# Patient Record
Sex: MALE | Race: BLACK OR AFRICAN AMERICAN | NOT HISPANIC OR LATINO
[De-identification: names, ages, dates, MRNs, and addresses within clinical notes are randomized per-mention and may not be internally consistent; named-entity substitution may affect disease eponyms.]

---

## 2018-10-17 ENCOUNTER — TRANSCRIPTION ENCOUNTER (OUTPATIENT)
Age: 58
End: 2018-10-17

## 2018-10-19 ENCOUNTER — APPOINTMENT (OUTPATIENT)
Dept: NEUROSURGERY | Facility: CLINIC | Age: 58
End: 2018-10-19
Payer: COMMERCIAL

## 2018-10-19 VITALS
WEIGHT: 211 LBS | SYSTOLIC BLOOD PRESSURE: 160 MMHG | DIASTOLIC BLOOD PRESSURE: 91 MMHG | OXYGEN SATURATION: 98 % | HEART RATE: 82 BPM | HEIGHT: 73 IN | TEMPERATURE: 98.2 F | BODY MASS INDEX: 27.96 KG/M2

## 2018-10-19 VITALS — SYSTOLIC BLOOD PRESSURE: 160 MMHG | DIASTOLIC BLOOD PRESSURE: 81 MMHG

## 2018-10-19 DIAGNOSIS — Z80.9 FAMILY HISTORY OF MALIGNANT NEOPLASM, UNSPECIFIED: ICD-10-CM

## 2018-10-19 DIAGNOSIS — Z82.61 FAMILY HISTORY OF ARTHRITIS: ICD-10-CM

## 2018-10-19 DIAGNOSIS — Z78.9 OTHER SPECIFIED HEALTH STATUS: ICD-10-CM

## 2018-10-19 DIAGNOSIS — Z87.891 PERSONAL HISTORY OF NICOTINE DEPENDENCE: ICD-10-CM

## 2018-10-19 PROCEDURE — 99203 OFFICE O/P NEW LOW 30 MIN: CPT

## 2018-10-23 ENCOUNTER — NON-APPOINTMENT (OUTPATIENT)
Age: 58
End: 2018-10-23

## 2018-10-23 ENCOUNTER — APPOINTMENT (OUTPATIENT)
Dept: INTERNAL MEDICINE | Facility: CLINIC | Age: 58
End: 2018-10-23
Payer: COMMERCIAL

## 2018-10-23 VITALS
SYSTOLIC BLOOD PRESSURE: 150 MMHG | HEIGHT: 73 IN | WEIGHT: 211 LBS | HEART RATE: 70 BPM | BODY MASS INDEX: 27.96 KG/M2 | DIASTOLIC BLOOD PRESSURE: 80 MMHG

## 2018-10-23 DIAGNOSIS — Z80.8 FAMILY HISTORY OF MALIGNANT NEOPLASM OF OTHER ORGANS OR SYSTEMS: ICD-10-CM

## 2018-10-23 DIAGNOSIS — Z83.3 FAMILY HISTORY OF DIABETES MELLITUS: ICD-10-CM

## 2018-10-23 DIAGNOSIS — Z23 ENCOUNTER FOR IMMUNIZATION: ICD-10-CM

## 2018-10-23 PROCEDURE — 90686 IIV4 VACC NO PRSV 0.5 ML IM: CPT

## 2018-10-23 PROCEDURE — 90471 IMMUNIZATION ADMIN: CPT

## 2018-10-23 PROCEDURE — 90715 TDAP VACCINE 7 YRS/> IM: CPT

## 2018-10-23 PROCEDURE — 93000 ELECTROCARDIOGRAM COMPLETE: CPT

## 2018-10-23 PROCEDURE — 36415 COLL VENOUS BLD VENIPUNCTURE: CPT

## 2018-10-23 PROCEDURE — 82270 OCCULT BLOOD FECES: CPT

## 2018-10-23 PROCEDURE — 90472 IMMUNIZATION ADMIN EACH ADD: CPT

## 2018-10-23 PROCEDURE — 99386 PREV VISIT NEW AGE 40-64: CPT | Mod: 25

## 2018-10-23 NOTE — HEALTH RISK ASSESSMENT
[Excellent] : ~his/her~  mood as  excellent [] : No [No falls in past year] : Patient reported no falls in the past year [HIV Test offered] : HIV Test offered [Hepatitis C test offered] : Hepatitis C test offered [Change in mental status noted] : Change in mental status noted [Language] : denies difficulty with language [Behavior] : denies difficulty with behavior [Learning/Retaining New Information] : denies difficulty learning/retaining new information [Handling Complex Tasks] : denies difficulty handling complex tasks [Reasoning] : denies difficulty with reasoning [Spatial Ability and Orientation] : denies difficulty with spatial ability and orientation [With Significant Other] : lives with significant other [Employed] : employed [High School] : high school [] :  [# Of Children ___] : has [unfilled] children [Sexually Active] : sexually active [Fully functional (bathing, dressing, toileting, transferring, walking, feeding)] : Fully functional (bathing, dressing, toileting, transferring, walking, feeding) [Fully functional (using the telephone, shopping, preparing meals, housekeeping, doing laundry, using] : Fully functional and needs no help or supervision to perform IADLs (using the telephone, shopping, preparing meals, housekeeping, doing laundry, using transportation, managing medications and managing finances) [Reports changes in hearing] : Reports no changes in hearing [Reports changes in dental health] : Reports changes in dental health [Smoke Detector] : smoke detector [Seat Belt] :  uses seat belt [Travel to Developing Areas] : does not  travel to developing areas [TB Exposure] : is being exposed to tuberculosis [ColonoscopyDate] : due

## 2018-10-23 NOTE — ASSESSMENT
[FreeTextEntry1] : bp borderline will follow\par low back likely arthritis obtain x ray\par see urology prostate large and harder then expected\par complete labs\par ekg ok\par flu and tdap given

## 2018-10-23 NOTE — HISTORY OF PRESENT ILLNESS
[FreeTextEntry1] : For cpe [de-identified] : For CPE.  of [place].  Patient has been having low back pain for past few weeks\par Saw Neurosurgery and x rays were ordered he has not gone yet\par He feels pain in am and pm ok in the middle of day.  Has urine dribbling but no chest pain sob nvd or palpitations

## 2018-10-23 NOTE — PHYSICAL EXAM

## 2018-10-24 ENCOUNTER — RECORD ABSTRACTING (OUTPATIENT)
Age: 58
End: 2018-10-24

## 2018-10-24 LAB
25(OH)D3 SERPL-MCNC: 18 NG/ML
ALBUMIN SERPL ELPH-MCNC: 4.1 G/DL
ALP BLD-CCNC: 328 U/L
ALT SERPL-CCNC: 18 U/L
ANION GAP SERPL CALC-SCNC: 13 MMOL/L
APPEARANCE: CLEAR
AST SERPL-CCNC: 18 U/L
BASOPHILS # BLD AUTO: 0.03 K/UL
BASOPHILS NFR BLD AUTO: 0.3 %
BILIRUB SERPL-MCNC: 0.3 MG/DL
BILIRUBIN URINE: NEGATIVE
BLOOD URINE: NEGATIVE
BUN SERPL-MCNC: 13 MG/DL
CALCIUM SERPL-MCNC: 9.6 MG/DL
CHLORIDE SERPL-SCNC: 101 MMOL/L
CHOLEST SERPL-MCNC: 202 MG/DL
CHOLEST/HDLC SERPL: 4 RATIO
CO2 SERPL-SCNC: 27 MMOL/L
COLOR: YELLOW
CREAT SERPL-MCNC: 1.28 MG/DL
CREAT SPEC-SCNC: 191 MG/DL
EOSINOPHIL # BLD AUTO: 0.06 K/UL
EOSINOPHIL NFR BLD AUTO: 0.6 %
GLUCOSE QUALITATIVE U: NEGATIVE MG/DL
GLUCOSE SERPL-MCNC: 114 MG/DL
HBA1C MFR BLD HPLC: 6 %
HCT VFR BLD CALC: 42 %
HCV AB SER QL: NONREACTIVE
HCV S/CO RATIO: 0.08 S/CO
HDLC SERPL-MCNC: 50 MG/DL
HGB BLD-MCNC: 13.3 G/DL
HIV1+2 AB SPEC QL IA.RAPID: NONREACTIVE
IMM GRANULOCYTES NFR BLD AUTO: 0.8 %
KETONES URINE: NEGATIVE
LDLC SERPL CALC-MCNC: 121 MG/DL
LEUKOCYTE ESTERASE URINE: NEGATIVE
LYMPHOCYTES # BLD AUTO: 2.66 K/UL
LYMPHOCYTES NFR BLD AUTO: 24.5 %
MAN DIFF?: NORMAL
MCHC RBC-ENTMCNC: 26.6 PG
MCHC RBC-ENTMCNC: 31.7 GM/DL
MCV RBC AUTO: 84 FL
MICROALBUMIN 24H UR DL<=1MG/L-MCNC: 2.2 MG/DL
MICROALBUMIN/CREAT 24H UR-RTO: 12 MG/G
MONOCYTES # BLD AUTO: 1.16 K/UL
MONOCYTES NFR BLD AUTO: 10.7 %
NEUTROPHILS # BLD AUTO: 6.86 K/UL
NEUTROPHILS NFR BLD AUTO: 63.1 %
NITRITE URINE: NEGATIVE
PH URINE: 5
PLATELET # BLD AUTO: 276 K/UL
POTASSIUM SERPL-SCNC: 3.6 MMOL/L
PROT SERPL-MCNC: 7.4 G/DL
PROTEIN URINE: NEGATIVE MG/DL
PSA SERPL-MCNC: 890.6 NG/ML
RBC # BLD: 5 M/UL
RBC # FLD: 15.5 %
SODIUM SERPL-SCNC: 141 MMOL/L
SPECIFIC GRAVITY URINE: 1.02
T4 FREE SERPL-MCNC: 1.1 NG/DL
TRIGL SERPL-MCNC: 153 MG/DL
TSH SERPL-ACNC: 1.97 UIU/ML
UROBILINOGEN URINE: NEGATIVE MG/DL
WBC # FLD AUTO: 10.86 K/UL

## 2018-11-01 ENCOUNTER — APPOINTMENT (OUTPATIENT)
Dept: NEUROSURGERY | Facility: CLINIC | Age: 58
End: 2018-11-01
Payer: COMMERCIAL

## 2018-11-01 VITALS
SYSTOLIC BLOOD PRESSURE: 155 MMHG | OXYGEN SATURATION: 97 % | WEIGHT: 211 LBS | DIASTOLIC BLOOD PRESSURE: 87 MMHG | HEART RATE: 84 BPM | BODY MASS INDEX: 27.96 KG/M2 | TEMPERATURE: 98 F | HEIGHT: 73 IN

## 2018-11-01 DIAGNOSIS — M54.16 RADICULOPATHY, LUMBAR REGION: ICD-10-CM

## 2018-11-01 PROCEDURE — 99213 OFFICE O/P EST LOW 20 MIN: CPT

## 2018-11-02 PROBLEM — M54.16 LUMBAR RADICULOPATHY: Status: ACTIVE | Noted: 2018-11-02

## 2018-11-08 ENCOUNTER — APPOINTMENT (OUTPATIENT)
Dept: UROLOGY | Facility: CLINIC | Age: 58
End: 2018-11-08
Payer: COMMERCIAL

## 2018-11-08 DIAGNOSIS — M54.5 LOW BACK PAIN: ICD-10-CM

## 2018-11-08 PROCEDURE — 99204 OFFICE O/P NEW MOD 45 MIN: CPT

## 2018-11-09 LAB — PSA SERPL-MCNC: 1034 NG/ML

## 2018-11-11 ENCOUNTER — FORM ENCOUNTER (OUTPATIENT)
Age: 58
End: 2018-11-11

## 2018-11-12 ENCOUNTER — OUTPATIENT (OUTPATIENT)
Dept: OUTPATIENT SERVICES | Facility: HOSPITAL | Age: 58
LOS: 1 days | End: 2018-11-12
Payer: COMMERCIAL

## 2018-11-12 ENCOUNTER — APPOINTMENT (OUTPATIENT)
Dept: NUCLEAR MEDICINE | Facility: IMAGING CENTER | Age: 58
End: 2018-11-12
Payer: COMMERCIAL

## 2018-11-12 DIAGNOSIS — C61 MALIGNANT NEOPLASM OF PROSTATE: ICD-10-CM

## 2018-11-12 PROBLEM — M54.5 LOWER BACK PAIN: Status: ACTIVE | Noted: 2018-10-19

## 2018-11-12 PROCEDURE — 78306 BONE IMAGING WHOLE BODY: CPT

## 2018-11-12 PROCEDURE — 78306 BONE IMAGING WHOLE BODY: CPT | Mod: 26

## 2018-11-12 PROCEDURE — A9561: CPT

## 2018-11-27 ENCOUNTER — APPOINTMENT (OUTPATIENT)
Dept: UROLOGY | Facility: CLINIC | Age: 58
End: 2018-11-27
Payer: COMMERCIAL

## 2018-11-27 ENCOUNTER — OUTPATIENT (OUTPATIENT)
Dept: OUTPATIENT SERVICES | Facility: HOSPITAL | Age: 58
LOS: 1 days | End: 2018-11-27
Payer: COMMERCIAL

## 2018-11-27 VITALS
HEART RATE: 77 BPM | SYSTOLIC BLOOD PRESSURE: 143 MMHG | DIASTOLIC BLOOD PRESSURE: 77 MMHG | TEMPERATURE: 97.7 F | RESPIRATION RATE: 16 BRPM

## 2018-11-27 DIAGNOSIS — R35.0 FREQUENCY OF MICTURITION: ICD-10-CM

## 2018-11-27 PROCEDURE — 76872 US TRANSRECTAL: CPT

## 2018-11-27 PROCEDURE — 76942 ECHO GUIDE FOR BIOPSY: CPT | Mod: 26,59

## 2018-11-27 PROCEDURE — 55700: CPT | Mod: 22

## 2018-11-27 PROCEDURE — 76872 US TRANSRECTAL: CPT | Mod: 26

## 2018-11-27 PROCEDURE — 55700: CPT

## 2018-11-27 PROCEDURE — 76942 ECHO GUIDE FOR BIOPSY: CPT | Mod: 59

## 2018-11-30 ENCOUNTER — MEDICATION RENEWAL (OUTPATIENT)
Age: 58
End: 2018-11-30

## 2018-12-03 ENCOUNTER — MEDICATION RENEWAL (OUTPATIENT)
Age: 58
End: 2018-12-03

## 2018-12-03 LAB — CORE LAB BIOPSY: NORMAL

## 2018-12-05 ENCOUNTER — INPATIENT (INPATIENT)
Facility: HOSPITAL | Age: 58
LOS: 3 days | Discharge: HOME | DRG: 542 | End: 2018-12-09
Attending: INTERNAL MEDICINE | Admitting: INTERNAL MEDICINE
Payer: COMMERCIAL

## 2018-12-05 ENCOUNTER — CLINICAL ADVICE (OUTPATIENT)
Age: 58
End: 2018-12-05

## 2018-12-05 VITALS
SYSTOLIC BLOOD PRESSURE: 135 MMHG | WEIGHT: 210.1 LBS | HEIGHT: 73 IN | HEART RATE: 74 BPM | RESPIRATION RATE: 20 BRPM | DIASTOLIC BLOOD PRESSURE: 76 MMHG | TEMPERATURE: 98 F | OXYGEN SATURATION: 97 %

## 2018-12-05 DIAGNOSIS — C61 MALIGNANT NEOPLASM OF PROSTATE: ICD-10-CM

## 2018-12-05 LAB
ALBUMIN SERPL ELPH-MCNC: 3.8 G/DL — SIGNIFICANT CHANGE UP (ref 3.3–5.2)
ALP SERPL-CCNC: 354 U/L — HIGH (ref 40–120)
ALT FLD-CCNC: 7 U/L — SIGNIFICANT CHANGE UP
ANION GAP SERPL CALC-SCNC: 12 MMOL/L — SIGNIFICANT CHANGE UP (ref 5–17)
AST SERPL-CCNC: 14 U/L — SIGNIFICANT CHANGE UP
BASOPHILS # BLD AUTO: 0 K/UL — SIGNIFICANT CHANGE UP (ref 0–0.2)
BASOPHILS NFR BLD AUTO: 0.2 % — SIGNIFICANT CHANGE UP (ref 0–2)
BILIRUB SERPL-MCNC: 0.3 MG/DL — LOW (ref 0.4–2)
BUN SERPL-MCNC: 26 MG/DL — HIGH (ref 8–20)
CALCIUM SERPL-MCNC: 10.9 MG/DL — HIGH (ref 8.6–10.2)
CHLORIDE SERPL-SCNC: 98 MMOL/L — SIGNIFICANT CHANGE UP (ref 98–107)
CO2 SERPL-SCNC: 29 MMOL/L — SIGNIFICANT CHANGE UP (ref 22–29)
CREAT SERPL-MCNC: 1.95 MG/DL — HIGH (ref 0.5–1.3)
EOSINOPHIL # BLD AUTO: 0 K/UL — SIGNIFICANT CHANGE UP (ref 0–0.5)
EOSINOPHIL NFR BLD AUTO: 0.4 % — SIGNIFICANT CHANGE UP (ref 0–6)
GLUCOSE SERPL-MCNC: 100 MG/DL — SIGNIFICANT CHANGE UP (ref 70–115)
HCT VFR BLD CALC: 38.6 % — LOW (ref 42–52)
HGB BLD-MCNC: 12.6 G/DL — LOW (ref 14–18)
LIDOCAIN IGE QN: 28 U/L — SIGNIFICANT CHANGE UP (ref 22–51)
LYMPHOCYTES # BLD AUTO: 2.2 K/UL — SIGNIFICANT CHANGE UP (ref 1–4.8)
LYMPHOCYTES # BLD AUTO: 20.2 % — SIGNIFICANT CHANGE UP (ref 20–55)
MCHC RBC-ENTMCNC: 25.6 PG — LOW (ref 27–31)
MCHC RBC-ENTMCNC: 32.6 G/DL — SIGNIFICANT CHANGE UP (ref 32–36)
MCV RBC AUTO: 78.5 FL — LOW (ref 80–94)
MONOCYTES # BLD AUTO: 1.2 K/UL — HIGH (ref 0–0.8)
MONOCYTES NFR BLD AUTO: 10.9 % — HIGH (ref 3–10)
NEUTROPHILS # BLD AUTO: 7.5 K/UL — SIGNIFICANT CHANGE UP (ref 1.8–8)
NEUTROPHILS NFR BLD AUTO: 67.6 % — SIGNIFICANT CHANGE UP (ref 37–73)
PLATELET # BLD AUTO: 242 K/UL — SIGNIFICANT CHANGE UP (ref 150–400)
POTASSIUM SERPL-MCNC: 4.8 MMOL/L — SIGNIFICANT CHANGE UP (ref 3.5–5.3)
POTASSIUM SERPL-SCNC: 4.8 MMOL/L — SIGNIFICANT CHANGE UP (ref 3.5–5.3)
PROT SERPL-MCNC: 7.6 G/DL — SIGNIFICANT CHANGE UP (ref 6.6–8.7)
RBC # BLD: 4.92 M/UL — SIGNIFICANT CHANGE UP (ref 4.6–6.2)
RBC # FLD: 14.6 % — SIGNIFICANT CHANGE UP (ref 11–15.6)
SODIUM SERPL-SCNC: 139 MMOL/L — SIGNIFICANT CHANGE UP (ref 135–145)
WBC # BLD: 11.1 K/UL — HIGH (ref 4.8–10.8)
WBC # FLD AUTO: 11.1 K/UL — HIGH (ref 4.8–10.8)

## 2018-12-05 PROCEDURE — 72128 CT CHEST SPINE W/O DYE: CPT | Mod: 26

## 2018-12-05 PROCEDURE — 99285 EMERGENCY DEPT VISIT HI MDM: CPT

## 2018-12-05 PROCEDURE — 99253 IP/OBS CNSLTJ NEW/EST LOW 45: CPT | Mod: 25

## 2018-12-05 PROCEDURE — 72131 CT LUMBAR SPINE W/O DYE: CPT | Mod: 26

## 2018-12-05 RX ORDER — DEXAMETHASONE 0.5 MG/5ML
10 ELIXIR ORAL ONCE
Qty: 0 | Refills: 0 | Status: DISCONTINUED | OUTPATIENT
Start: 2018-12-05 | End: 2018-12-05

## 2018-12-05 RX ORDER — SODIUM CHLORIDE 9 MG/ML
3 INJECTION INTRAMUSCULAR; INTRAVENOUS; SUBCUTANEOUS ONCE
Qty: 0 | Refills: 0 | Status: COMPLETED | OUTPATIENT
Start: 2018-12-05 | End: 2018-12-05

## 2018-12-05 RX ORDER — KETOROLAC TROMETHAMINE 30 MG/ML
15 SYRINGE (ML) INJECTION ONCE
Qty: 0 | Refills: 0 | Status: DISCONTINUED | OUTPATIENT
Start: 2018-12-05 | End: 2018-12-05

## 2018-12-05 RX ORDER — DIAZEPAM 5 MG
5 TABLET ORAL ONCE
Qty: 0 | Refills: 0 | Status: DISCONTINUED | OUTPATIENT
Start: 2018-12-05 | End: 2018-12-05

## 2018-12-05 RX ORDER — SODIUM CHLORIDE 9 MG/ML
1000 INJECTION INTRAMUSCULAR; INTRAVENOUS; SUBCUTANEOUS ONCE
Qty: 0 | Refills: 0 | Status: COMPLETED | OUTPATIENT
Start: 2018-12-05 | End: 2018-12-05

## 2018-12-05 RX ORDER — DEXAMETHASONE 0.5 MG/5ML
10 ELIXIR ORAL ONCE
Qty: 0 | Refills: 0 | Status: COMPLETED | OUTPATIENT
Start: 2018-12-05 | End: 2018-12-05

## 2018-12-05 RX ORDER — MORPHINE SULFATE 50 MG/1
8 CAPSULE, EXTENDED RELEASE ORAL ONCE
Qty: 0 | Refills: 0 | Status: DISCONTINUED | OUTPATIENT
Start: 2018-12-05 | End: 2018-12-05

## 2018-12-05 RX ADMIN — Medication 102 MILLIGRAM(S): at 22:18

## 2018-12-05 RX ADMIN — SODIUM CHLORIDE 1000 MILLILITER(S): 9 INJECTION INTRAMUSCULAR; INTRAVENOUS; SUBCUTANEOUS at 20:10

## 2018-12-05 RX ADMIN — Medication 10 MILLIGRAM(S): at 22:50

## 2018-12-05 RX ADMIN — SODIUM CHLORIDE 1000 MILLILITER(S): 9 INJECTION INTRAMUSCULAR; INTRAVENOUS; SUBCUTANEOUS at 22:00

## 2018-12-05 RX ADMIN — SODIUM CHLORIDE 3 MILLILITER(S): 9 INJECTION INTRAMUSCULAR; INTRAVENOUS; SUBCUTANEOUS at 20:40

## 2018-12-05 RX ADMIN — Medication 15 MILLIGRAM(S): at 20:14

## 2018-12-05 RX ADMIN — MORPHINE SULFATE 8 MILLIGRAM(S): 50 CAPSULE, EXTENDED RELEASE ORAL at 20:14

## 2018-12-05 RX ADMIN — Medication 5 MILLIGRAM(S): at 20:11

## 2018-12-05 RX ADMIN — Medication 15 MILLIGRAM(S): at 20:35

## 2018-12-05 RX ADMIN — MORPHINE SULFATE 8 MILLIGRAM(S): 50 CAPSULE, EXTENDED RELEASE ORAL at 20:35

## 2018-12-05 NOTE — ED PROVIDER NOTE - NS ED ROS FT
Review of Systems  •	CONSTITUTIONAL - no  fever, no diaphoresis, no weight change  •	SKIN - no rash  •	HEMATOLOGIC - no bleeding, no bruising  •	EYES - no eye pain, no blurred vision  •	ENT - no change in hearing, no pain  •	RESPIRATORY - no shortness of breath, no cough  •	CARDIAC - no chest pain, no palpitations  •	GI - no abd pain, no nausea, no vomiting, no diarrhea, no constipation, no bleeding  •	GENITO-URINARY - no discharge, no dysuria; no hematuria,   •	ENDO - no polydypsia, no polyurea, no heat/no cold intolerance  •	MUSCULOSKELETAL -(+)  back pain,  joint pain, no swelling, no redness  •	NEUROLOGIC - no weakness, no headache, no anesthesia, no paresthesias  •	PSYCH - no anxiety, non suicidal, non homicidal, no hallucination, no depression

## 2018-12-05 NOTE — ED ADULT TRIAGE NOTE - CHIEF COMPLAINT QUOTE
Pt BIBA c/o feeling tired and weak, reports he has prostate CA and having lower back pain and his pain is not controlled with his currently medicine. Pt advised to come to ED by his PMD Dr. Farah

## 2018-12-05 NOTE — ED ADULT NURSE NOTE - OBJECTIVE STATEMENT
Pt presents to ED brought in  BIBA with c/o faitigue and weakness. Pt  reports "I've prostate cancer and I am having uncontrolled lower back pain with my current pain medicine". Pt stated he was advised to come to ED by his Primary MD Dr. Farah

## 2018-12-05 NOTE — ED PROVIDER NOTE - MEDICAL DECISION MAKING DETAILS
57 yo M prostate CA with mets to bone p/w inability to walk secondary to back pain, no signs of cord compression, CT spine showed age-indetermined compression fracture. will need admission and further work up.

## 2018-12-05 NOTE — ED ADULT NURSE NOTE - CHPI ED NUR SYMPTOMS NEG
no fatigue/no constipation/no neck tenderness/no anorexia/no bladder dysfunction/no bowel dysfunction

## 2018-12-05 NOTE — ED PROVIDER NOTE - OBJECTIVE STATEMENT
59 yo M hx of prostate ca 57 yo M hx of prostate ca with metastsis to spine and pelvis p/w severe lower back pain and inability to ambulate secondary to pain. He had CT scan and bone scan at [facility] a few weeks prior. He has not started on therapy for his prostate ca and spine metastisis. He has a schedule with oncology for the 12th and doesn't recall the name. He was a oxycodon and steroid, he then switched over to fentayl patch. He report the pain was so severe, he called his PMD and told him to come to [facility] for admission. He denies fever, no saddle anesthesia, no incontinence. he report weak stream from his  prostate issues.     PMD:

## 2018-12-05 NOTE — ED ADULT NURSE NOTE - NSIMPLEMENTINTERV_GEN_ALL_ED
Implemented All Fall Risk Interventions:  [place] to call system. Call bell, personal items and telephone within reach. Instruct patient to call for assistance. Room bathroom lighting operational. Non-slip footwear when patient is off stretcher. Physically safe environment: no spills, clutter or unnecessary equipment. Stretcher in lowest position, wheels locked, appropriate side rails in place. Provide visual cue, wrist band, yellow gown, etc. Monitor gait and stability. Monitor for mental status changes and reorient to person, place, and time. Review medications for side effects contributing to fall risk. Reinforce activity limits and safety measures with patient and family.

## 2018-12-05 NOTE — ED PROVIDER NOTE - CARE PLAN
Principal Discharge DX:	Compression fracture of spine Principal Discharge DX:	Intractable back pain  Secondary Diagnosis:	Compression fracture of spine  Secondary Diagnosis:	Metastatic cancer

## 2018-12-05 NOTE — ED PROVIDER NOTE - PROGRESS NOTE DETAILS
spoke to hospitalist  for admission, request ortho consult. I spoke to ortho, recommend MRI without contrast.

## 2018-12-05 NOTE — ED PROVIDER NOTE - PHYSICAL EXAMINATION
VITAL SIGNS: I have reviewed nursing notes and confirm.  CONSTITUTIONAL: Well-developed; well-nourished; in no acute distress.  SKIN: Skin exam is warm and dry, no acute rash.  HEAD: Normocephalic; atraumatic.  EYES: PERRL, EOM intact; conjunctiva and sclera clear.  ENT: No nasal discharge; airway clear. Throat clear.  NECK: Supple; non tender.  No lymphadenopathy.  CARD: S1, S2 normal; no murmurs, gallops, or rubs. Regular rate and rhythm.  RESP: No wheezes,  no rales or rhonchi.   ABD: Normal bowel sounds; soft; non-distended; non-tender; no hepatosplenomegaly.  EXT: Normal ROM.(+) decreased ROM of right leg secondary to pain   back: (+)  thoracic and lumbar tenderness   NEURO: Alert, oriented. Grossly unremarkable. No focal deficits. no facial droop, moves all extremities,    PSYCH: Cooperative, appropriate.

## 2018-12-06 ENCOUNTER — TRANSCRIPTION ENCOUNTER (OUTPATIENT)
Age: 58
End: 2018-12-06

## 2018-12-06 DIAGNOSIS — M54.9 DORSALGIA, UNSPECIFIED: ICD-10-CM

## 2018-12-06 DIAGNOSIS — C61 MALIGNANT NEOPLASM OF PROSTATE: ICD-10-CM

## 2018-12-06 DIAGNOSIS — M48.50XA COLLAPSED VERTEBRA, NOT ELSEWHERE CLASSIFIED, SITE UNSPECIFIED, INITIAL ENCOUNTER FOR FRACTURE: ICD-10-CM

## 2018-12-06 DIAGNOSIS — N28.9 DISORDER OF KIDNEY AND URETER, UNSPECIFIED: ICD-10-CM

## 2018-12-06 LAB
ANION GAP SERPL CALC-SCNC: 10 MMOL/L — SIGNIFICANT CHANGE UP (ref 5–17)
APPEARANCE UR: CLEAR — SIGNIFICANT CHANGE UP
APTT BLD: 31.3 SEC — SIGNIFICANT CHANGE UP (ref 27.5–36.3)
BILIRUB UR-MCNC: NEGATIVE — SIGNIFICANT CHANGE UP
BUN SERPL-MCNC: 29 MG/DL — HIGH (ref 8–20)
CALCIUM SERPL-MCNC: 10.4 MG/DL — HIGH (ref 8.6–10.2)
CHLORIDE SERPL-SCNC: 100 MMOL/L — SIGNIFICANT CHANGE UP (ref 98–107)
CO2 SERPL-SCNC: 26 MMOL/L — SIGNIFICANT CHANGE UP (ref 22–29)
COLOR SPEC: YELLOW — SIGNIFICANT CHANGE UP
CREAT SERPL-MCNC: 1.76 MG/DL — HIGH (ref 0.5–1.3)
DIFF PNL FLD: ABNORMAL
EPI CELLS # UR: SIGNIFICANT CHANGE UP
GLUCOSE SERPL-MCNC: 125 MG/DL — HIGH (ref 70–115)
GLUCOSE UR QL: NEGATIVE MG/DL — SIGNIFICANT CHANGE UP
INR BLD: 1.04 RATIO — SIGNIFICANT CHANGE UP (ref 0.88–1.16)
KETONES UR-MCNC: NEGATIVE — SIGNIFICANT CHANGE UP
LEUKOCYTE ESTERASE UR-ACNC: NEGATIVE — SIGNIFICANT CHANGE UP
NITRITE UR-MCNC: NEGATIVE — SIGNIFICANT CHANGE UP
PH UR: 5 — SIGNIFICANT CHANGE UP (ref 5–8)
POTASSIUM SERPL-MCNC: 5.1 MMOL/L — SIGNIFICANT CHANGE UP (ref 3.5–5.3)
POTASSIUM SERPL-SCNC: 5.1 MMOL/L — SIGNIFICANT CHANGE UP (ref 3.5–5.3)
PROT UR-MCNC: 15 MG/DL
PROTHROM AB SERPL-ACNC: 12 SEC — SIGNIFICANT CHANGE UP (ref 10–12.9)
RBC CASTS # UR COMP ASSIST: SIGNIFICANT CHANGE UP /HPF (ref 0–4)
SODIUM SERPL-SCNC: 136 MMOL/L — SIGNIFICANT CHANGE UP (ref 135–145)
SP GR SPEC: 1.02 — SIGNIFICANT CHANGE UP (ref 1.01–1.02)
UROBILINOGEN FLD QL: NEGATIVE MG/DL — SIGNIFICANT CHANGE UP
WBC UR QL: NEGATIVE — SIGNIFICANT CHANGE UP

## 2018-12-06 PROCEDURE — 99223 1ST HOSP IP/OBS HIGH 75: CPT

## 2018-12-06 PROCEDURE — 77263 THER RADIOLOGY TX PLNG CPLX: CPT

## 2018-12-06 PROCEDURE — 72148 MRI LUMBAR SPINE W/O DYE: CPT | Mod: 26

## 2018-12-06 PROCEDURE — 76700 US EXAM ABDOM COMPLETE: CPT | Mod: 26

## 2018-12-06 PROCEDURE — 71045 X-RAY EXAM CHEST 1 VIEW: CPT | Mod: 26

## 2018-12-06 PROCEDURE — 72146 MRI CHEST SPINE W/O DYE: CPT | Mod: 26

## 2018-12-06 PROCEDURE — 72141 MRI NECK SPINE W/O DYE: CPT | Mod: 26

## 2018-12-06 PROCEDURE — 93010 ELECTROCARDIOGRAM REPORT: CPT

## 2018-12-06 PROCEDURE — 12345: CPT | Mod: NC,GC

## 2018-12-06 PROCEDURE — 99223 1ST HOSP IP/OBS HIGH 75: CPT | Mod: 57

## 2018-12-06 RX ORDER — BICALUTAMIDE 50 MG/1
50 TABLET, FILM COATED ORAL DAILY
Qty: 0 | Refills: 0 | Status: DISCONTINUED | OUTPATIENT
Start: 2018-12-06 | End: 2018-12-09

## 2018-12-06 RX ORDER — SODIUM CHLORIDE 9 MG/ML
1000 INJECTION INTRAMUSCULAR; INTRAVENOUS; SUBCUTANEOUS
Qty: 0 | Refills: 0 | Status: DISCONTINUED | OUTPATIENT
Start: 2018-12-06 | End: 2018-12-09

## 2018-12-06 RX ORDER — DEXAMETHASONE 0.5 MG/5ML
1 ELIXIR ORAL
Qty: 0 | Refills: 0 | COMMUNITY
Start: 2018-12-06

## 2018-12-06 RX ORDER — DOCUSATE SODIUM 100 MG
100 CAPSULE ORAL
Qty: 0 | Refills: 0 | Status: DISCONTINUED | OUTPATIENT
Start: 2018-12-06 | End: 2018-12-09

## 2018-12-06 RX ORDER — ONDANSETRON 8 MG/1
4 TABLET, FILM COATED ORAL EVERY 6 HOURS
Qty: 0 | Refills: 0 | Status: DISCONTINUED | OUTPATIENT
Start: 2018-12-06 | End: 2018-12-09

## 2018-12-06 RX ORDER — DEXAMETHASONE 0.5 MG/5ML
4 ELIXIR ORAL THREE TIMES A DAY
Qty: 0 | Refills: 0 | Status: DISCONTINUED | OUTPATIENT
Start: 2018-12-06 | End: 2018-12-09

## 2018-12-06 RX ORDER — HYDROMORPHONE HYDROCHLORIDE 2 MG/ML
1 INJECTION INTRAMUSCULAR; INTRAVENOUS; SUBCUTANEOUS
Qty: 0 | Refills: 0 | COMMUNITY
Start: 2018-12-06

## 2018-12-06 RX ORDER — METHOCARBAMOL 500 MG/1
750 TABLET, FILM COATED ORAL THREE TIMES A DAY
Qty: 0 | Refills: 0 | Status: DISCONTINUED | OUTPATIENT
Start: 2018-12-06 | End: 2018-12-07

## 2018-12-06 RX ORDER — ONDANSETRON 8 MG/1
0 TABLET, FILM COATED ORAL
Qty: 0 | Refills: 0 | COMMUNITY
Start: 2018-12-06

## 2018-12-06 RX ORDER — METHOCARBAMOL 500 MG/1
1 TABLET, FILM COATED ORAL
Qty: 0 | Refills: 0 | COMMUNITY
Start: 2018-12-06

## 2018-12-06 RX ORDER — HYDROMORPHONE HYDROCHLORIDE 2 MG/ML
4 INJECTION INTRAMUSCULAR; INTRAVENOUS; SUBCUTANEOUS EVERY 4 HOURS
Qty: 0 | Refills: 0 | Status: DISCONTINUED | OUTPATIENT
Start: 2018-12-06 | End: 2018-12-09

## 2018-12-06 RX ORDER — HYDROMORPHONE HYDROCHLORIDE 2 MG/ML
1 INJECTION INTRAMUSCULAR; INTRAVENOUS; SUBCUTANEOUS EVERY 4 HOURS
Qty: 0 | Refills: 0 | Status: DISCONTINUED | OUTPATIENT
Start: 2018-12-06 | End: 2018-12-06

## 2018-12-06 RX ORDER — OXYCODONE HYDROCHLORIDE 5 MG/1
0 TABLET ORAL
Qty: 0 | Refills: 0 | COMMUNITY

## 2018-12-06 RX ORDER — HEPARIN SODIUM 5000 [USP'U]/ML
5000 INJECTION INTRAVENOUS; SUBCUTANEOUS EVERY 12 HOURS
Qty: 0 | Refills: 0 | Status: DISCONTINUED | OUTPATIENT
Start: 2018-12-06 | End: 2018-12-09

## 2018-12-06 RX ORDER — DOCUSATE SODIUM 100 MG
1 CAPSULE ORAL
Qty: 0 | Refills: 0 | COMMUNITY
Start: 2018-12-06

## 2018-12-06 RX ORDER — HYDROMORPHONE HYDROCHLORIDE 2 MG/ML
0.5 INJECTION INTRAMUSCULAR; INTRAVENOUS; SUBCUTANEOUS EVERY 4 HOURS
Qty: 0 | Refills: 0 | Status: DISCONTINUED | OUTPATIENT
Start: 2018-12-06 | End: 2018-12-06

## 2018-12-06 RX ORDER — SODIUM CHLORIDE 9 MG/ML
1000 INJECTION INTRAMUSCULAR; INTRAVENOUS; SUBCUTANEOUS
Qty: 0 | Refills: 0 | Status: COMPLETED | OUTPATIENT
Start: 2018-12-06 | End: 2018-12-06

## 2018-12-06 RX ORDER — KETOCONAZOLE 200 MG
400 TABLET ORAL EVERY 8 HOURS
Qty: 0 | Refills: 0 | Status: DISCONTINUED | OUTPATIENT
Start: 2018-12-06 | End: 2018-12-09

## 2018-12-06 RX ADMIN — SODIUM CHLORIDE 84 MILLILITER(S): 9 INJECTION INTRAMUSCULAR; INTRAVENOUS; SUBCUTANEOUS at 15:14

## 2018-12-06 RX ADMIN — METHOCARBAMOL 750 MILLIGRAM(S): 500 TABLET, FILM COATED ORAL at 17:49

## 2018-12-06 RX ADMIN — Medication 400 MILLIGRAM(S): at 21:08

## 2018-12-06 RX ADMIN — Medication 4 MILLIGRAM(S): at 21:07

## 2018-12-06 RX ADMIN — SODIUM CHLORIDE 84 MILLILITER(S): 9 INJECTION INTRAMUSCULAR; INTRAVENOUS; SUBCUTANEOUS at 19:07

## 2018-12-06 RX ADMIN — HYDROMORPHONE HYDROCHLORIDE 4 MILLIGRAM(S): 2 INJECTION INTRAMUSCULAR; INTRAVENOUS; SUBCUTANEOUS at 23:25

## 2018-12-06 RX ADMIN — Medication 100 MILLIGRAM(S): at 17:48

## 2018-12-06 RX ADMIN — Medication 100 MILLIGRAM(S): at 05:57

## 2018-12-06 RX ADMIN — HEPARIN SODIUM 5000 UNIT(S): 5000 INJECTION INTRAVENOUS; SUBCUTANEOUS at 05:57

## 2018-12-06 RX ADMIN — HYDROMORPHONE HYDROCHLORIDE 4 MILLIGRAM(S): 2 INJECTION INTRAMUSCULAR; INTRAVENOUS; SUBCUTANEOUS at 22:36

## 2018-12-06 RX ADMIN — Medication 4 MILLIGRAM(S): at 15:13

## 2018-12-06 RX ADMIN — BICALUTAMIDE 50 MILLIGRAM(S): 50 TABLET, FILM COATED ORAL at 15:13

## 2018-12-06 RX ADMIN — HEPARIN SODIUM 5000 UNIT(S): 5000 INJECTION INTRAVENOUS; SUBCUTANEOUS at 17:48

## 2018-12-06 RX ADMIN — SODIUM CHLORIDE 125 MILLILITER(S): 9 INJECTION INTRAMUSCULAR; INTRAVENOUS; SUBCUTANEOUS at 05:58

## 2018-12-06 NOTE — DISCHARGE NOTE ADULT - CARE PROVIDERS DIRECT ADDRESSES
[email or web address]_Unknown

## 2018-12-06 NOTE — CONSULT NOTE ADULT - SUBJECTIVE AND OBJECTIVE BOX
Asked by the ED attending to evaluate a 58 year old male who presented to the ED with pain when walking.  Patient is found this morning laying on a stretcher comfortable.  He states when he is at rest he doesn't have pain but when he is ambulating, standing, turning, etc, he has pain.  Approximately 2 months ago he started having pain which has progressively gotten worse.  He was evaluated and found to have Prostate Cancer which has spread to his Spine.  Patient denies any recent trauma.  Also denies any numbness and tingling in his extremity.  Denies any incotience or changes in his bowel or bladder habits.  CT Scan was ordered by the ED and shows patient to have Compression fractures of his spine.  He will be admitted to medicine for Physical Therapy recommendations.      PMHx:  Prostate Cancer    PSHx:  No surgical History    Allergies:  NKDA    Review of Systems:  Muscular Skeletal: Lower Back Pain  Remaining Systems were reviewed and found to be negative    Physical Exam:  Vital Signs Last 24 Hrs  T(C): 36.6 (06 Dec 2018 02:16), Max: 36.6 (05 Dec 2018 17:38)  T(F): 97.8 (06 Dec 2018 02:16), Max: 97.8 (05 Dec 2018 17:38)  HR: 57 (06 Dec 2018 02:16) (57 - 74)  BP: 131/79 (06 Dec 2018 02:16) (131/79 - 135/76)  BP(mean): --  RR: 20 (06 Dec 2018 02:16) (20 - 20)  SpO2: 95% (06 Dec 2018 02:16) (95% - 97%)    Lower Back & Lower Extremity  + Straight Leg raise 4+/5, pain when legs extended  PROM of hips illicit no pain  4+/5 Dorsal/Plantar Flexion of foot bilaterally  + sensation  2+ Pedal pulse  Calf soft, non-tender  no pathologic relexes noted    CT Scan of Spine:  EXAM:  CT LUMBAR SPINE                         EXAM:  CT THORACIC SPINE                          PROCEDURE DATE:  12/05/2018          INTERPRETATION:  Clinical indication: Metastatic bone lesion, back pain.     Technique: Contiguous CT axial images of the thoracic and lumbar spine   were obtained without intravenous contrast. Images were computer   reconstructed in the sagittal and coronal planes. 3-D volume rendering   images were obtained from a separate workstation.    Comparison: None.    Findings: There is diffuse heterogeneous pattern of bone mineralization   with scattered lucencies and sclerosis at T5, T9, T10 and L3,   corresponding to known neoplasm. There is age-indeterminate, mild   anterior wedging of the lower thoracic vertebral bodies, notably T11 and   T12. There are scattered age-indeterminate superior endplate depressions,   notable at T5 and L1. These vertebral bodies likely represent   age-indeterminate pathologic compression fractures. There is mild   posterior bulging at T11. There is no significant retropulsion or   traumatic malalignment.      There are multilevel degenerative changes characterized by discs, facet   arthropathy and ligamentum flavum infolding with resultant neural   foraminal narrowing and central canal stenosis.    Degenerative changes are seen in the visualized sacroiliac joints. There   is paraspinal muscle bulk loss with fatty replacement.    Partially visualized abdomen and pelvis: Mild left hydroureteronephrosis.   A 5.8 x 6.0 cm right renal cyst. Colon diverticulosis. Enlarged left   pelvic lymph nodes, for example, 3.2 x 4.8 cm left iliac lymph node with   low-attenuation, likely containing necrosis. Prominent wall of the   partially visualized urinary bladder.    Impression:    Known osseous neoplasm with age-indeterminate pathologic compression   fractures as described. Follow-up with a contrast enhanced MRI would be   helpful for further characterization.     Multilevel degenerative changes of the lumbar spine.    Mildleft hydroureteronephrosis.     Nonspecific left pelvic lymphadenopathy, which may related to known   neoplasm. Prominent wall of the partially visualized urinary bladder,   which may be due to partial distention although underlying infection or   lesion cannot be excluded. Recommend correlation with previous outside   abdominal/pelvic imaging.                LUPE CHAVEZ M.D., ATTENDING RADIOLOGIST  This document has been electronically signed. Dec  5 2018 10:23PM      A/P: Compressing Fractures T5, L1  - Pain medication as needed  - F/U MRI results  - Physical Therapy Consult  - Reviewed case with Dr. Flores  - Discussed plan with Patient

## 2018-12-06 NOTE — DISCHARGE NOTE ADULT - PLAN OF CARE
control of pts pain Transfer to [facility] for IR guided Kyphoplasty Transfer to [facility] for IR guided Kyphoplasty Stable for discharge It is important to see your primary physician as well as the physicians noted below within the next week to perform a comprehensive medical review.  Call their offices for an appointment as soon as you leave the hospital.  If you do not have a primary physician, contact the [facility] [place] [phone] located on [address] [place] [address].  Your medical issues appear to be stable at this time, but if your symptoms recur or worsen, contact your physicians and/or return to the hospital if necessary.  If you encounter any issues or questions with your medication, call your physicians before stopping the medication.  Do not drive.  Limit your diet to 2 grams of sodium daily. Continue current medications.

## 2018-12-06 NOTE — PATIENT PROFILE ADULT - VISION (WITH CORRECTIVE LENSES IF THE PATIENT USUALLY WEARS THEM):
GLASSES FOR DISTANCE/Partially impaired: cannot see medication labels or newsprint, but can see obstacles in path, and the surrounding layout; can count fingers at arm's length

## 2018-12-06 NOTE — DISCHARGE NOTE ADULT - CARE PROVIDER_API CALL
Buddy Farah (MD), Internal Medicine  [address] [place] [address]  Phone: [phone]  Fax: [phone]    Anthony Flores (DO), Orthopaedic Surgery  [address]  [place] [address]  Phone: [phone]  Fax: [phone]    Jayme Canseco (MD), Hematology; Internal Medicine; Medical Oncology  [address] [place] [address]  Phone: [phone]  Fax: [phone]    Joseph Meyer), Anesthesiology; Pain Medicine  [address]  [address]  [place] [address]  Phone: [phone]  Fax: [phone]

## 2018-12-06 NOTE — DISCHARGE NOTE ADULT - PATIENT PORTAL LINK FT
You can access the [facility] Patient Portal, offered by [facility], by registering with the following website: [email or web address]

## 2018-12-06 NOTE — H&P ADULT - PROBLEM SELECTOR PLAN 2
will continue with his casodex and once acute pain issue is resolved pt. needs to see his oncologist.

## 2018-12-06 NOTE — CONSULT NOTE ADULT - PROBLEM SELECTOR RECOMMENDATION 2
1. consult IR for multiple kypho / bx  2. consider TLSO brace if no efficacy with #1  3. steroid, m relaxant and opioid meds ordered

## 2018-12-06 NOTE — CONSULT NOTE ADULT - SUBJECTIVE AND OBJECTIVE BOX
Pleasant 58 /yo male diagnosed Oct with metastatic prostate CA.  Found on routine exam (had not seen a doc in ten years).  PSA >800.  Has had bx but has not seen Oncologist yet.  No treatment for prostate ca started yet.  Noted axial back pain intermittently over past month. 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  Conservative treatment with no efficacy; ultimately was started on po steroids, oxycodone (5mg, failed, 10 mg failed, started on duragesic @ 25 mch, used for approx 5 days with nominal efficacy).  Contacted his primary physician re: increasing axial back pain and was told to report to ED.  Presently, he has had 10 mg dexamthasone, 15 mg ketorolac and 8 mg MS (all last noc, > 8-10 hours ago) with overall improvement in subjective pain.  Denies any radicular component; pain with axial loading, mid to low back (lower thoracic/upper lumbar).  Relatively comfortable at present, even with movement for exam.    PMHs: otherwise robust, healthy male; new dx Prostate ca with mets past two months.      PE:  VSS, A&O x 3.  CN 2-12 intact.  Lungs clear, RRR sans murmur, abd benign.  UE and LE strength symmetric and full.  Diffuse axial back pain with increased pain (albeit moderate) with direct palpation over post spinous process low thoracic and upper lumber spine.  Trunk rotation with nominal increase in pain.  (again, after NSAID and Dexamethasone last noc s/s all diminshed).      BUN/Cr 26/1.95  eGFR = 37    CT T/L spine 12/5/18:   There is diffuse heterogeneous pattern of bone mineralization   with scattered lucencies and sclerosis at T5, T9, T10 and L3,   corresponding to known neoplasm. There is age-indeterminate, mild   anterior wedging of the lower thoracic vertebral bodies, notably T11 and   T12. There are scattered age-indeterminate superior endplate depressions,   notable at T5 and L1. These vertebral bodies likely represent   age-indeterminate pathologic compression fractures. There is mild   posterior bulging at T11. There is no significant retropulsion or   traumatic malalignment.      : Mild left hydroureteronephrosis.   A 5.8 x 6.0 cm right renal cyst. Colon diverticulosis. Enlarged left   pelvic lymph nodes, for example, 3.2 x 4.8 cm left iliac lymph node with   low-attenuation, likely containing necrosis    Nonspecific left pelvic lymphadenopathy, which may related to known   neoplasm. Prominent wall of the partially visualized urinary bladder,   which may be due to partial distention although underlying infection or   lesion cannot be excluded.    NM Bone Scan:    FINDINGS: There are multiple foci of abnormally increased tracer   accumulation in the thoracolumbar spine, bilateral ribs, scapulae and   pelvis. There is physiologic tracer distribution in the remainder of the  visualized skeleton.     ASSESSMENT:  1. Recent dx metastatic prostate CA   2. Mets to thoracic/lumbar spine  3. pathologic fx T11,12, L1 with mild compression  4. PATRICK secondary to hydronephrosis (eGFR = 37) contra-indicating ongoing use of NSAID    PLAN:  1. Continue dexamethasone per primary/onc  2. Consult IR re multiple comp fx, may need bx + kyphoplasty  3. Add m. relaxant and opioid analgesic for residual pain

## 2018-12-06 NOTE — DISCHARGE NOTE ADULT - SECONDARY DIAGNOSIS.
Intractable back pain Pain due to malignant neoplasm metastatic to bone Constipation due to opioid therapy

## 2018-12-06 NOTE — H&P ADULT - HISTORY OF PRESENT ILLNESS
57 y/o male came to ER for increasing low back pain for past 3 days to the point where he could not take pain any more and was having difficulty ambulating. pt. has been recently diagnosed with prostate cancer and he is following with urologist dr. dennis in [place] and was supposed to see oncologist in [place] as well on 12/12/18. no fall, no trauma to back. no  loss of urine / fecal control. pain is not travelling down his legs. no abd. pain. no n/v/d. no cp. no sob. 57 y/o male came to ER for increasing low back pain for past 3 days to the point where he could not take pain any more and was having difficulty ambulating. pt. has been recently diagnosed with prostate cancer and he is following with urologist dr. dennis in [place] and was supposed to see oncologist in [place] as well on 12/12/18 ( first appointment ) no fall, no trauma to back. no  loss of urine / fecal control. pain is not travelling down his legs. no abd. pain. no n/v/d. no cp. no sob.

## 2018-12-06 NOTE — DISCHARGE NOTE ADULT - HOSPITAL COURSE
57 y/o male came to ER for increasing low back pain for past 3 days to the point where he could not take pain any more and was having difficulty ambulating. pt. has been recently diagnosed with prostate cancer and he is following with urologist dr. dennis in [place] and was supposed to see oncologist in [place] as well on 12/12/18 ( first appointment ) no fall, no trauma to back. no  loss of urine / fecal control. pain is not travelling down his legs. no abd. pain. no n/v/d. no cp. no sob. found to have compression fracture. Orthopedic and pain management consulted. Discussed with his PCP, and DR MENDEZ from radiation and Urologist also involved.     CT T/L spine 12/5/18:   There is diffuse heterogeneous pattern of bone mineralization   with scattered lucencies and sclerosis at T5, T9, T10 and L3,   corresponding to known neoplasm. There is age-indeterminate, mild   anterior wedging of the lower thoracic vertebral bodies, notably T11 and   T12. There are scattered age-indeterminate superior endplate depressions,   notable at T5 and L1. These vertebral bodies likely represent   age-indeterminate pathologic compression fractures. There is mild   posterior bulging at T11. There is no significant retropulsion or   traumatic malalignment.      : Mild left hydroureteronephrosis.   A 5.8 x 6.0 cm right renal cyst. Colon diverticulosis. Enlarged left   pelvic lymph nodes, for example, 3.2 x 4.8 cm left iliac lymph node with   low-attenuation, likely containing necrosis    Nonspecific left pelvic lymphadenopathy, which may related to known   neoplasm. Prominent wall of the partially visualized urinary bladder,   which may be due to partial distention although underlying infection or   lesion cannot be excluded.    NM Bone Scan:    FINDINGS: There are multiple foci of abnormally increased tracer   accumulation in the thoracolumbar spine, bilateral ribs, scapulae and   pelvis. There is physiologic tracer distribution in the remainder of the  visualized skeleton.     ASSESSMENT:  1. Recent dx metastatic prostate CA   2. Mets to thoracic/lumbar spine  3. pathologic fx T11,12, L1 with mild compression  4. PATRICK secondary to hydronephrosis (eGFR = 37) contra-indicating ongoing use of NSAID    PLAN:  1. Continue dexamethasone per primary/onc  2. Consult IR re multiple comp fx, may need bx + kyphoplasty  3. Add m. relaxant and opioid analgesic for residual pain      Received call from Interventional Radiologist, - Pt needs to be transferred to [facility], he has arranged for IR guided kyphoplasty - accepting IR - Dr. Nova. Patient: CHRIS QUINTANA [id number]                 Internal Medicine Hospitalist Discharge Note    59 y/o male with recently diagnosed prostrate cancer came to ER for increasing low back pain for 3 days found to have metastatic disease to spine.  Evaluated by ortho and radiation oncology.  Recommended TLSO brace, underwent mapping for RT.  Seen by pain management for pain control.    Pain remains well controlled.  Required some Dilaudid overnight.  + BM.  No weakness / numbness / incontinence.  Wearing TLSO brace.        > Prostrate ca with extensive mets to spine:  MRI spine results noted, no evidence of compression fracture and cord impingement.  TLSO brace per ortho.  RT this coming week.  Pt has established outpatient f/u at [facility].    > Cancer related pain - appears to be better controlled on current regimen.  continue Tizanidine, naloxegol, prednisone, fentanyl patch with prn diluaidid.  Confirmed, pt has Duragesic 50mcg/h at home.   > Debility - Pt input noted, ambulates independently.  > PATRICK, ATN  Cr appears to be stable. Nephrology followup as outpatient.  > Elevated LFTs - possibly due to metastatic cancer.  conservative mgt, undergoing active treatment    Disposition: stable for discharge.  Outpatient followup as above.  Patient was advised to return if they experience any recurrence or worsening of symptoms.  Total time spent on discharge was 40 minutes.  -Milton Quintana D.O., Hospitalist, [facility], [place]

## 2018-12-06 NOTE — DISCHARGE NOTE ADULT - CARE PLAN
Principal Discharge DX:	Compression fracture of spine  Goal:	control of pts pain  Assessment and plan of treatment:	Transfer to [facility] for IR guided Kyphoplasty  Secondary Diagnosis:	Intractable back pain  Goal:	control of pts pain  Assessment and plan of treatment:	Transfer to [facility] for IR guided Kyphoplasty Principal Discharge DX:	Prostate cancer metastatic to bone  Goal:	Stable for discharge  Assessment and plan of treatment:	It is important to see your primary physician as well as the physicians noted below within the next week to perform a comprehensive medical review.  Call their offices for an appointment as soon as you leave the hospital.  If you do not have a primary physician, contact the [facility] [place] [phone] located on [address] [place] [address].  Your medical issues appear to be stable at this time, but if your symptoms recur or worsen, contact your physicians and/or return to the hospital if necessary.  If you encounter any issues or questions with your medication, call your physicians before stopping the medication.  Do not drive.  Limit your diet to 2 grams of sodium daily.  Secondary Diagnosis:	Intractable back pain  Goal:	control of pts pain  Assessment and plan of treatment:	Continue current medications.  Secondary Diagnosis:	Pain due to malignant neoplasm metastatic to bone  Assessment and plan of treatment:	Continue current medications.  Secondary Diagnosis:	Constipation due to opioid therapy  Assessment and plan of treatment:	Continue current medications.

## 2018-12-06 NOTE — CONSULT NOTE ADULT - SUBJECTIVE AND OBJECTIVE BOX
CHRIS QUINTANA  [id number]  58y, Male    Dorsalgia      Patient is a 58y old  Male who presents with a chief complaint of back pain (06 Dec 2018 17:24)      HPI:  57 y/o male came to ER for increasing low back pain for past 3 days to the point where he could not take pain any more and was having difficulty ambulating. pt. has been recently diagnosed with prostate cancer and he is following with urologist dr. dennis in [place] and was supposed to see oncologist in [place] as well on 18 ( first appointment ) no fall, no trauma to back. no  loss of urine / fecal control. pain is not travelling down his legs. no abd. pain. no n/v/d. no cp. no sob. (06 Dec 2018 01:13)    The patient underwent transperineal prostate biopsy by Dr. Franklin about 10 days ago. He has noted what he describes as 15/10 back pain. He has not noted hematuria but has weak FOS and some frequency. At times he does not feel empty after voiding. Weight stable. appetite is fair. Numerous studies done recently.       Allergies    No Known Allergies    Intolerances        MEDICATIONS  (STANDING):  bicalutamide 50 milliGRAM(s) Oral daily  dexamethasone     Tablet 4 milliGRAM(s) Oral three times a day  docusate sodium 100 milliGRAM(s) Oral two times a day  heparin  Injectable 5000 Unit(s) SubCutaneous every 12 hours  sodium chloride 0.9%. 1000 milliLiter(s) (84 mL/Hr) IV Continuous <Continuous>    MEDICATIONS  (PRN):  HYDROmorphone   Tablet 4 milliGRAM(s) Oral every 4 hours PRN Moderate Pain (4 - 6)  methocarbamol 750 milliGRAM(s) Oral three times a day PRN Muscle Spasm  ondansetron Injectable 4 milliGRAM(s) IV Push every 6 hours PRN Nausea and/or Vomiting      PAST MEDICAL & SURGICAL HISTORY:  Prostate cancer  No significant past surgical history      REVIEW OF SYSTEMS      General: back pain and overall malaise	    Skin/Breast: no intching  	  Ophthalmologic: no vision changes  	  ENMT:	no hearing changes    Respiratory and Thorax: no wheezing  	  Cardiovascular:	no cramping in legs    Gastrointestinal:	 some constipation    Genitourinary: weak stream	    Musculoskeletal:	 pain in legs    Neurological:	no seizures    Psychiatric:	some depressed mood    Hematology/Lymphatics:	no bleeding tendency    Endocrine: no cold intolerance	    I&O's Detail      PE:    Vital Signs Last 24 Hrs  T(C): 36.5 (06 Dec 2018 15:27), Max: 36.7 (06 Dec 2018 06:01)  T(F): 97.7 (06 Dec 2018 15:27), Max: 98.1 (06 Dec 2018 06:01)  HR: 67 (06 Dec 2018 15:27) (57 - 67)  BP: 107/64 (06 Dec 2018 15:27) (107/64 - 131/79)  BP(mean): --  RR: 18 (06 Dec 2018 15:27) (18 - 20)  SpO2: 95% (06 Dec 2018 15:27) (95% - 97%)    Daily     Daily     PHYSICAL EXAM:      Constitutional: appears uncomofortable    Eyes: conjugate gaze    ENMT: NC/AT    Neck: good ROM    Back:    Respiratory: no respiratory distress    Cardiovascular: good cap refill    Gastrointestinal: no abdominal distention    Genitourinary: done recently by Dr. Franklin    Rectal: done recently by Dr. Franklin    Extremities: noedema    Neurological: no tremor    Skin: no jaundice    Lymph Nodes: no cervical ester enlargement    Musculoskeletal: good ROM    Psychiatric: alert and oriented X 3        Labs:                          12.6   11.1  )-----------( 242      ( 05 Dec 2018 20:14 )             38.6       12-06    136  |  100  |  29.0<H>  ----------------------------<  125<H>  5.1   |  26.0  |  1.76<H>    Ca    10.4<H>      06 Dec 2018 07:45    TPro  7.6  /  Alb  3.8  /  TBili  0.3<L>  /  DBili  x   /  AST  14  /  ALT  7   /  AlkPhos  354<H>  12-05      Urinalysis Basic - ( 06 Dec 2018 02:20 )    Color: Yellow / Appearance: Clear / S.025 / pH: x  Gluc: x / Ketone: Negative  / Bili: Negative / Urobili: Negative mg/dL   Blood: x / Protein: 15 mg/dL / Nitrite: Negative   Leuk Esterase: Negative / RBC: 0-2 /HPF / WBC Negative   Sq Epi: x / Non Sq Epi: Occasional / Bacteria: x    < from: US Abdomen Complete (18 @ 14:53) >    IMPRESSION:     Mild hydronephrosis of the left kidney.    Bilateral increased renal cortical echogenicity, probably renal   parenchymal disease.      < end of copied text >      < from: MR Lumbar Spine No Cont (18 @ 13:56) >  IMPRESSION:    Extensive osseous metastasis without acute compression fracture or   significant spinal canal compromise      < end of copied text >    < from: CT Lumbar Spine No Cont (18 @ 21:37) >  Impression:    Known osseous neoplasm with age-indeterminate pathologic compression   fractures as described. Follow-up with a contrast enhanced MRI would be   helpful for further characterization.     Multilevel degenerative changes of the lumbar spine.    Mildleft hydroureteronephrosis.     Nonspecific left pelvic lymphadenopathy, which may related to known   neoplasm. Prominent wall of the partially visualized urinary bladder,   which may be due to partial distention although underlying infection or   lesion cannot be excluded. Recommend correlation with previous outside   abdominal/pelvic imaging.      < end of copied text >          Impression:    metastatic prostate cancer  severe back pain secondary to bony mets  hydronephrosis either due to ester external compression of bladder involvement of prostate cancer  PATRICK vs. CKD        Plan:    Radiation onc consult pending for pain control/palliation  continue casodex  Will give Lupron 7.5 mg IM   start ketoconazole to depress testosterone rapidly.  Needs Heme/Onc consult for continuation of Lupron and possible Xgeva or Xtandi.     Ra Douglass MD  18 @ 18:48
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
## 2018-12-06 NOTE — CONSULT NOTE ADULT - SUBJECTIVE AND OBJECTIVE BOX
History of Present Illness: 	  59 y/o male came to ER for increasing low back pain for past 3 days to the point where he could not take pain any more and was having difficulty ambulating. pt. has been recently diagnosed with prostate cancer and he is following with urologist dr. dennis in [place] and was supposed to see oncologist in [place] as well on 12/12/18 ( first appointment ) no fall, no trauma to back. no  loss of urine / fecal control. pain is not travelling down his legs. no abd. pain. no n/v/d. no cp. no sob.       Allergies and Intolerances:        Allergies:  	No Known Allergies:     Home Medications:   * Outpatient Medication Status not yet specified    .    Patient History:    Past Medical History:  Prostate cancer.     Past Surgical History:  No significant past surgical history.     Family History:  No pertinent family history in first degree relatives.     Social History:  no smoking. etoh sociall    Vital Signs Last 24 Hrs  T(C): 36.6 (06 Dec 2018 02:16), Max: 36.6 (05 Dec 2018 17:38)  T(F): 97.8 (06 Dec 2018 02:16), Max: 97.8 (05 Dec 2018 17:38)  HR: 57 (06 Dec 2018 02:16) (57 - 74)  BP: 131/79 (06 Dec 2018 02:16) (131/79 - 135/76)  BP(mean): --  RR: 20 (06 Dec 2018 02:16) (20 - 20)  SpO2: 95% (06 Dec 2018 02:16) (95% - 97%)      Physical Exam: General: Well developed AA male lying in bed  not in distress.   HEENT: AT, NC. PERRL. intact EOM. no throat erythema or exudate.   Neck: supple. no JVD.   Chest: CTA bilaterally  Heart: normal S1,S2. RRR. no heart murmur. no edema.   Abdomen: soft. non-tender. non-distended. + BS.   rectal : deferred by pt.   Ext: no calf tenderness on either side. negative SLR b/L. Pt. feels pain to palpation over mid and lower lumbar spine area, no erythema or warmth noted over entire spine area. reflexes and sensory intact over B/L LE. pt. can move toes on both sides without sig. difficulty.  Vascular : 2 + DP B/L.   Neuro: AAO x3. no focal weakness.or sensory deficits. no speech disorder. CN II to XII intact.  Skin: no rash noted. no diaphoresis. normal tone.  LABS	  General Chemistry:	    05-Dec-2018 20:14, Comprehensive Metabolic Panel	  Sodium, Serum: 139, [135 - 145 mmol/L]	  Potassium, Serum: 4.8, [3.5 - 5.3 mmol/L]	  Chloride, Serum: 98, [98 - 107 mmol/L]	  Carbon Dioxide, Serum: 29.0, [22.0 - 29.0 mmol/L]	  Anion Gap, Serum: 12, [5 - 17 mmol/L]	  Blood Urea Nitrogen, Serum:    26.0, [8.0 - 20.0 mg/dL]	  Creatinine, Serum:    1.95, [0.50 - 1.30 mg/dL]	  Glucose, Serum: 100, [70 - 115 mg/dL]	  Calcium, Total Serum:    10.9, [8.6 - 10.2 mg/dL]	  Protein Total, Serum: 7.6, [6.6 - 8.7 g/dL]	  Albumin, Serum: 3.8, [3.3 - 5.2 g/dL]	  Bilirubin Total, Serum:    0.3, [0.4 - 2.0 mg/dL]	  Alkaline Phosphatase, Serum:    354, [40 - 120 U/L]	  Aspartate Aminotransferase (AST/SGOT): 14, [ - <=39 U/L]	  Alanine Aminotransferase (ALT/SGPT): 7, [ - <=40 U/L]   Hematology:	    05-Dec-2018 20:14, Complete Blood Count + Automated Diff	  WBC Count:    11.1, [4.8 - 10.8 K/uL]	  RBC Count: 4.92, [4.60 - 6.20 M/uL]	  Hemoglobin:    12.6, [14.0 - 18.0 g/dL]	  Hematocrit:    38.6, [42.0 - 52.0 %]	  Mean Cell Volume:    78.5, [80.0 - 94.0 fl]	  Mean Cell Hemoglobin:    25.6, [27.0 - 31.0 pg]	  Mean Cell Hemoglobin Conc: 32.6, [32.0 - 36.0 g/dL]	  Red Cell Distrib Width: 14.6, [11.0 - 15.6 %]	  Platelet Count - Automated: 242, [150 - 400 K/uL]	  Auto Neutrophil #: 7.5, [1.8 - 8.0 K/uL]	  Auto Lymphocyte #: 2.2, [1.0 - 4.8 K/uL]	  Auto Monocyte #:    1.2, [0.0 - 0.8 K/uL]	  Auto Eosinophil #: 0.0, [0.0 - 0.5 K/uL]	  Auto Basophil #: 0.0, [0.0 - 0.2 K/uL]	  Auto Neutrophil %: 67.6, [37.0 - 73.0 %], Differential percentages must be correlated with absolute numbers for  clinical significance.	  Auto Lymphocyte %: 20.2, [20.0 - 55.0 %]	  Auto Monocyte %:    10.9, [3.0 - 10.0 %]	  Auto Eosinophil %: 0.4, [0.0 - 6.0 %]	  Auto Basophil %: 0.2, [0.0 - 2.0 %]	      Radiology:   CT:	    05-Dec-2018 21:37, CT Lumbar Spine No Cont	  CT Lumbar Spine No Cont: EXAM:  CT LUMBAR SPINE                         EXAM:  CT THORACIC SPINE                          PROCEDURE DATE:  12/05/2018          INTERPRETATION:  Clinical indication: Metastatic bone lesion, back pain.     Technique: Contiguous CT axial images of the thoracic and lumbar spine   were obtained without intravenous contrast. Images were computer   reconstructed in the sagittal and coronal planes. 3-D volume rendering   images were obtained from a separate workstation.    Comparison: None.    Findings: There is diffuse heterogeneous pattern of bone mineralization   with scattered lucencies and sclerosis at T5, T9, T10 and L3,   corresponding to known neoplasm. There is age-indeterminate, mild   anterior wedging of the lower thoracic vertebral bodies, notably T11 and   T12. There are scattered age-indeterminate superior endplate depressions,   notable at T5 and L1. These vertebral bodies likely represent   age-indeterminate pathologic compression fractures. There is mild   posterior bulging at T11. There is no significant retropulsion or   traumatic malalignment.      There are multilevel degenerative changes characterized by discs, facet   arthropathy and ligamentum flavum infolding with resultant neural   foraminal narrowing and central canal stenosis.    Degenerative changes are seen in the visualized sacroiliac joints. There   is paraspinal muscle bulk loss with fatty replacement.    Partially visualized abdomen and pelvis: Mild left hydroureteronephrosis.   A 5.8 x 6.0 cm right renal cyst. Colon diverticulosis. Enlarged left   pelvic lymph nodes, for example, 3.2 x 4.8 cm left iliac lymph node with   low-attenuation, likely containing necrosis. Prominent wall of the   partially visualized urinary bladder.    Impression:    Known osseous neoplasm with age-indeterminate pathologic compression   fractures as described. Follow-up with a contrast enhanced MRI would be   helpful for further characterization.     Multilevel degenerative changes of the lumbar spine.    Mildleft hydroureteronephrosis.     Nonspecific left pelvic lymphadenopathy, which may related to known   neoplasm. Prominent wall of the partially visualized urinary bladder,   which may be due to partial distention although underlying infection or   lesion cannot be excluded. Recommend correlation with previous outside   abdominal/pelvic imaging.       LUPE CHAVEZ M.D., ATTENDING RADIOLOGIST  This document has been electronically signed. Dec  5 2018 10:23PM	  CT Thoracic Spine No Cont: EXAM:  CT LUMBAR SPINE                        	 EXAM:  CT THORACIC SPINE                        	  	PROCEDURE DATE:  12/05/2018    	  	  	  	INTERPRETATION:  Clinical indication: Metastatic bone lesion, back pain.   	  	Technique: Contiguous CT axial images of the thoracic and lumbar spine   	were obtained without intravenous contrast. Images were computer   	reconstructed in the sagittal and coronal planes. 3-D volume rendering   	images were obtained from a separate workstation.  	  	Comparison: None.  	  	Findings: There is diffuse heterogeneous pattern of bone mineralization   	with scattered lucencies and sclerosis at T5, T9, T10 and L3,   	corresponding to known neoplasm. There is age-indeterminate, mild   	anterior wedging of the lower thoracic vertebral bodies, notably T11 and   	T12. There are scattered age-indeterminate superior endplate depressions,   	notable at T5 and L1. These vertebral bodies likely represent   	age-indeterminate pathologic compression fractures. There is mild   	posterior bulging at T11. There is no significant retropulsion or   	traumatic malalignment.    	  	There are multilevel degenerative changes characterized by discs, facet   	arthropathy and ligamentum flavum infolding with resultant neural   	foraminal narrowing and central canal stenosis.    CT Thoracic Spine No Cont: EXAM:  CT LUMBAR SPINE                         EXAM:  CT THORACIC SPINE                          PROCEDURE DATE:  12/05/2018          INTERPRETATION:  Clinical indication: Metastatic bone lesion, back pain.     Technique: Contiguous CT axial images of the thoracic and lumbar spine   were obtained without intravenous contrast. Images were computer   reconstructed in the sagittal and coronal planes. 3-D volume rendering   images were obtained from a separate workstation.    Comparison: None.    Findings: There is diffuse heterogeneous pattern of bone mineralization   with scattered lucencies and sclerosis at T5, T9, T10 and L3,   corresponding to known neoplasm. There is age-indeterminate, mild   anterior wedging of the lower thoracic vertebral bodies, notably T11 and   T12. There are scattered age-indeterminate superior endplate depressions,   notable at T5 and L1. These vertebral bodies likely represent   age-indeterminate pathologic compression fractures. There is mild   posterior bulging at T11. There is no significant retropulsion or   traumatic malalignment.      There are multilevel degenerative changes characterized by discs, facet   arthropathy and ligamentum flavum infolding with resultant neural   foraminal narrowing and central canal stenosis.    Degenerative changes are seen in the visualized sacroiliac joints. There   is paraspinal muscle bulk loss with fatty replacement.    Partially visualized abdomen and pelvis: Mild left hydroureteronephrosis.   A 5.8 x 6.0 cm right renal cyst. Colon diverticulosis. Enlarged left   pelvic lymph nodes, for example, 3.2 x 4.8 cm left iliac lymph node with   low-attenuation, likely containing necrosis. Prominent wall of the   partially visualized urinary bladder.    Impression:    Known osseous neoplasm with age-indeterminate pathologic compression   fractures as described. Follow-up with a contrast enhanced MRI would be   helpful for further characterization.     Multilevel degenerative changes of the lumbar spine.    Mildleft hydroureteronephrosis.     Nonspecific left pelvic lymphadenopathy, which may related to known   neoplasm. Prominent wall of the partially visualized urinary bladder,   which may be due to partial distention although underlying infection or   lesion cannot be excluded. Recommend correlation with previous outside   abdominal/pelvic imaging.      LUPE CHAVEZ M.D., ATTENDING RADIOLOGIST  This document has been electronically signed. Dec  5 2018 10:23PM

## 2018-12-06 NOTE — H&P ADULT - PROBLEM SELECTOR PLAN 1
pt's ct scan thoracic and lumbar spine shows : scattered lucencies and sclerosis at T5, T9, T10 and L3, corresponding to   known neoplasm. There is age-indeterminate, mild anterior wedging of the   lower thoracic vertebral bodies, notably T11 and T12. There are scattered   age-indeterminate superior endplate depressions, notable at T5 and L1.  pain control, ER physician spoke to otho PA for consult and ortho PA has requested for mri of thoracic and lumbar spine. as per pt. morphine did not help much, will use dilaudid prn, will request for pain management consult. follow MRI and ortho recommendations.

## 2018-12-06 NOTE — H&P ADULT - NSHPPHYSICALEXAM_GEN_ALL_CORE
General: Well developed AA male lying in bed  not in distress.   HEENT: AT, NC. PERRL. intact EOM. no throat erythema or exudate.   Neck: supple. no JVD.   Chest: CTA bilaterally  Heart: normal S1,S2. RRR. no heart murmur. no edema.   Abdomen: soft. non-tender. non-distended. + BS.   rectal : deferred by pt.   Ext: no calf tenderness on either side. negative SLR b/L. Pt. feels pain to palpation over lower lumbar spine area, no erythema or warmth noted over entire spine area. reflexes and sensory intact over B/L LE. pt. can move toes on both sides without sig. difficulty.  Vascular : 2 + DP B/L.   Neuro: AAO x3. no focal weakness. no speech disorder. CN II to XII intact.   Skin: no rash noted. no diaphoresis. normal tone. General: Well developed AA male lying in bed  not in distress.   HEENT: AT, NC. PERRL. intact EOM. no throat erythema or exudate.   Neck: supple. no JVD.   Chest: CTA bilaterally  Heart: normal S1,S2. RRR. no heart murmur. no edema.   Abdomen: soft. non-tender. non-distended. + BS.   rectal : deferred by pt.   Ext: no calf tenderness on either side. negative SLR b/L. Pt. feels pain to palpation over mid and lower lumbar spine area, no erythema or warmth noted over entire spine area. reflexes and sensory intact over B/L LE. pt. can move toes on both sides without sig. difficulty.  Vascular : 2 + DP B/L.   Neuro: AAO x3. no focal weakness. no speech disorder. CN II to XII intact.   Skin: no rash noted. no diaphoresis. normal tone.

## 2018-12-06 NOTE — CONSULT NOTE ADULT - ASSESSMENT
Problem/Plan - 1:  ·  Problem: Intractable back sukhwinder likely mostly due to metastatic prostate cancer foci in spine No clinical or radiographic evidence of cord or cauda equina compression..   Patient's  CAT scan thoracic and lumbar spine shows : scattered lucencies and sclerosis at T5, T9, T10 and L3, corresponding to   known neoplasm. There is age-indeterminate, mild anterior wedging of the   lower thoracic vertebral bodies, notably T11 and T12. There are scattered   age-indeterminate superior endplate depressions, notable at T5 and L1.   MRI of spine  confirms above.  Recommend a short course of palliative radiotherapy to control pain . Will arrange transport to [facility] ([address] [place] [address], [phone])tomorrow for treatment planning  Agree with oral pain management with narcotic  anlgesics and steroids until effects of radiotherapy manifest (the week after the one week course is complete).RT can be  given as an outpatien or an inpatient.     Problem/Plan - 2:  ·  Problem: Prostate cancer.  Plan: will continue with his casodex and agree with addition of Lupron as suggested by urology. and once acute pain issue is resolved pt. needs to see his oncologists    to con      Problem/Plan - 3:  ·  Problem: Renal insufficiency.  Plan: no old labs for comparison, marshall ? iv fluids, follow am labs.

## 2018-12-06 NOTE — DISCHARGE NOTE ADULT - MEDICATION SUMMARY - MEDICATIONS TO TAKE
I will START or STAY ON the medications listed below when I get home from the hospital:    dexamethasone 4 mg oral tablet  -- 1 tab(s) by mouth 3 times a day  -- Indication: For Compression fracture of spine    HYDROmorphone 4 mg oral tablet  -- 1 tab(s) by mouth every 4 hours, As needed, Moderate Pain (4 - 6)  -- Indication: For Intractable back pain    ondansetron 2 mg/mL injectable solution  --  injectable   -- Indication: For Nausea    bicalutamide 50 mg oral tablet  -- 1 tab(s) by mouth every 24 hours  -- Indication: For Prostate cancer    docusate sodium 100 mg oral capsule  -- 1 cap(s) by mouth 2 times a day  -- Indication: For Constipation    methocarbamol 750 mg oral tablet  -- 1 tab(s) by mouth 3 times a day, As needed, Muscle Spasm  -- Indication: For Dorsalgia I will START or STAY ON the medications listed below when I get home from the hospital:    dexamethasone 4 mg oral tablet  -- 1 tab(s) by mouth 3 times a day  -- Indication: For Prostate cancer    HYDROmorphone 2 mg oral tablet  -- 1-2  tab(s) by mouth every 4 hours as needed pain MDD:12  -- Caution federal law prohibits the transfer of this drug to any person other  than the person for whom it was prescribed.  May cause drowsiness.  Alcohol may intensify this effect.  Use care when operating dangerous machinery.  This prescription cannot be refilled.  Using more of this medication than prescribed may cause serious breathing problems.    -- Indication: For Pain due to malignant neoplasm metastatic to bone    fentaNYL 50 mcg/hr transdermal film, extended release  -- 1 film(s) by transdermal patch every 72 hours  -- Indication: For Pain due to malignant neoplasm metastatic to bone    Zofran 4 mg oral tablet  -- 1 tab(s) by mouth every 8 hours as needed nausea / vomiting  -- Indication: For Prostate cancer    ketoconazole 200 mg oral tablet  -- 2 tab(s) by mouth every 8 hours  -- Indication: For Prostate cancer    bicalutamide 50 mg oral tablet  -- 1 tab(s) by mouth every 24 hours  -- Indication: For Prostate cancer    docusate sodium 100 mg oral capsule  -- 1 cap(s) by mouth 3 times a day  -- Indication: For Laxative    senna oral tablet  -- 2 tab(s) by mouth once a day (at bedtime)  -- Indication: For Laxative    polyethylene glycol 3350 oral powder for reconstitution  -- 17 gram(s) by mouth 2 times a day, As needed, Constipation  -- Indication: For Laxative    methocarbamol 750 mg oral tablet  -- 1 tab(s) by mouth 3 times a day, As needed, Muscle Spasm  -- Indication: For Muscle Relaxant    pantoprazole 40 mg oral delayed release tablet  -- 1 tab(s) by mouth once a day (before a meal)  -- Indication: For GI prophylaxis

## 2018-12-07 ENCOUNTER — APPOINTMENT (OUTPATIENT)
Dept: INTERNAL MEDICINE | Facility: CLINIC | Age: 58
End: 2018-12-07

## 2018-12-07 ENCOUNTER — OUTPATIENT (OUTPATIENT)
Dept: OUTPATIENT SERVICES | Facility: HOSPITAL | Age: 58
LOS: 1 days | Discharge: HOME | End: 2018-12-07
Payer: COMMERCIAL

## 2018-12-07 VITALS
HEART RATE: 62 BPM | BODY MASS INDEX: 27.72 KG/M2 | WEIGHT: 210.1 LBS | DIASTOLIC BLOOD PRESSURE: 78 MMHG | RESPIRATION RATE: 18 BRPM | OXYGEN SATURATION: 98 % | SYSTOLIC BLOOD PRESSURE: 126 MMHG

## 2018-12-07 DIAGNOSIS — K59.03 DRUG INDUCED CONSTIPATION: ICD-10-CM

## 2018-12-07 DIAGNOSIS — G89.3 NEOPLASM RELATED PAIN (ACUTE) (CHRONIC): ICD-10-CM

## 2018-12-07 PROBLEM — C61 MALIGNANT NEOPLASM OF PROSTATE: Chronic | Status: ACTIVE | Noted: 2018-12-05

## 2018-12-07 LAB
ANION GAP SERPL CALC-SCNC: 13 MMOL/L — SIGNIFICANT CHANGE UP (ref 5–17)
BUN SERPL-MCNC: 29 MG/DL — HIGH (ref 8–20)
CALCIUM SERPL-MCNC: 9.8 MG/DL — SIGNIFICANT CHANGE UP (ref 8.6–10.2)
CHLORIDE SERPL-SCNC: 102 MMOL/L — SIGNIFICANT CHANGE UP (ref 98–107)
CO2 SERPL-SCNC: 25 MMOL/L — SIGNIFICANT CHANGE UP (ref 22–29)
CREAT SERPL-MCNC: 1.82 MG/DL — HIGH (ref 0.5–1.3)
GLUCOSE SERPL-MCNC: 197 MG/DL — HIGH (ref 70–115)
POTASSIUM SERPL-MCNC: 5.3 MMOL/L — SIGNIFICANT CHANGE UP (ref 3.5–5.3)
POTASSIUM SERPL-SCNC: 5.3 MMOL/L — SIGNIFICANT CHANGE UP (ref 3.5–5.3)
SODIUM SERPL-SCNC: 140 MMOL/L — SIGNIFICANT CHANGE UP (ref 135–145)

## 2018-12-07 PROCEDURE — 99233 SBSQ HOSP IP/OBS HIGH 50: CPT

## 2018-12-07 PROCEDURE — 99232 SBSQ HOSP IP/OBS MODERATE 35: CPT | Mod: GC

## 2018-12-07 PROCEDURE — 77290 THER RAD SIMULAJ FIELD CPLX: CPT | Mod: 26

## 2018-12-07 PROCEDURE — 77334 RADIATION TREATMENT AID(S): CPT | Mod: 26

## 2018-12-07 PROCEDURE — 99254 IP/OBS CNSLTJ NEW/EST MOD 60: CPT

## 2018-12-07 RX ORDER — FENTANYL CITRATE 50 UG/ML
1 INJECTION INTRAVENOUS
Qty: 0 | Refills: 0 | Status: DISCONTINUED | OUTPATIENT
Start: 2018-12-07 | End: 2018-12-09

## 2018-12-07 RX ORDER — SENNA PLUS 8.6 MG/1
2 TABLET ORAL AT BEDTIME
Qty: 0 | Refills: 0 | Status: DISCONTINUED | OUTPATIENT
Start: 2018-12-07 | End: 2018-12-09

## 2018-12-07 RX ORDER — LIDOCAINE 4 G/100G
2 CREAM TOPICAL DAILY
Qty: 0 | Refills: 0 | Status: DISCONTINUED | OUTPATIENT
Start: 2018-12-07 | End: 2018-12-09

## 2018-12-07 RX ORDER — ACETAMINOPHEN 500 MG
975 TABLET ORAL EVERY 8 HOURS
Qty: 0 | Refills: 0 | Status: COMPLETED | OUTPATIENT
Start: 2018-12-07 | End: 2018-12-09

## 2018-12-07 RX ORDER — TIZANIDINE 4 MG/1
2 TABLET ORAL EVERY 6 HOURS
Qty: 0 | Refills: 0 | Status: DISCONTINUED | OUTPATIENT
Start: 2018-12-07 | End: 2018-12-09

## 2018-12-07 RX ORDER — HYDROMORPHONE HYDROCHLORIDE 2 MG/ML
6 INJECTION INTRAMUSCULAR; INTRAVENOUS; SUBCUTANEOUS EVERY 4 HOURS
Qty: 0 | Refills: 0 | Status: DISCONTINUED | OUTPATIENT
Start: 2018-12-07 | End: 2018-12-09

## 2018-12-07 RX ORDER — NALOXEGOL OXALATE 12.5 MG/1
12.5 TABLET, FILM COATED ORAL DAILY
Qty: 0 | Refills: 0 | Status: DISCONTINUED | OUTPATIENT
Start: 2018-12-07 | End: 2018-12-09

## 2018-12-07 RX ADMIN — LIDOCAINE 2 PATCH: 4 CREAM TOPICAL at 20:21

## 2018-12-07 RX ADMIN — HEPARIN SODIUM 5000 UNIT(S): 5000 INJECTION INTRAVENOUS; SUBCUTANEOUS at 05:58

## 2018-12-07 RX ADMIN — Medication 975 MILLIGRAM(S): at 17:15

## 2018-12-07 RX ADMIN — LIDOCAINE 2 PATCH: 4 CREAM TOPICAL at 16:40

## 2018-12-07 RX ADMIN — SODIUM CHLORIDE 84 MILLILITER(S): 9 INJECTION INTRAMUSCULAR; INTRAVENOUS; SUBCUTANEOUS at 05:58

## 2018-12-07 RX ADMIN — BICALUTAMIDE 50 MILLIGRAM(S): 50 TABLET, FILM COATED ORAL at 10:47

## 2018-12-07 RX ADMIN — TIZANIDINE 2 MILLIGRAM(S): 4 TABLET ORAL at 23:34

## 2018-12-07 RX ADMIN — NALOXEGOL OXALATE 12.5 MILLIGRAM(S): 12.5 TABLET, FILM COATED ORAL at 16:40

## 2018-12-07 RX ADMIN — FENTANYL CITRATE 1 PATCH: 50 INJECTION INTRAVENOUS at 20:21

## 2018-12-07 RX ADMIN — TIZANIDINE 2 MILLIGRAM(S): 4 TABLET ORAL at 13:22

## 2018-12-07 RX ADMIN — Medication 4 MILLIGRAM(S): at 05:58

## 2018-12-07 RX ADMIN — Medication 4 MILLIGRAM(S): at 13:23

## 2018-12-07 RX ADMIN — TIZANIDINE 2 MILLIGRAM(S): 4 TABLET ORAL at 16:39

## 2018-12-07 RX ADMIN — HYDROMORPHONE HYDROCHLORIDE 4 MILLIGRAM(S): 2 INJECTION INTRAMUSCULAR; INTRAVENOUS; SUBCUTANEOUS at 11:30

## 2018-12-07 RX ADMIN — HEPARIN SODIUM 5000 UNIT(S): 5000 INJECTION INTRAVENOUS; SUBCUTANEOUS at 16:41

## 2018-12-07 RX ADMIN — Medication 4 MILLIGRAM(S): at 22:22

## 2018-12-07 RX ADMIN — Medication 975 MILLIGRAM(S): at 22:22

## 2018-12-07 RX ADMIN — Medication 975 MILLIGRAM(S): at 16:41

## 2018-12-07 RX ADMIN — Medication 100 MILLIGRAM(S): at 16:41

## 2018-12-07 RX ADMIN — Medication 100 MILLIGRAM(S): at 05:58

## 2018-12-07 RX ADMIN — Medication 400 MILLIGRAM(S): at 13:23

## 2018-12-07 RX ADMIN — FENTANYL CITRATE 1 PATCH: 50 INJECTION INTRAVENOUS at 11:59

## 2018-12-07 RX ADMIN — Medication 400 MILLIGRAM(S): at 22:21

## 2018-12-07 RX ADMIN — Medication 975 MILLIGRAM(S): at 23:25

## 2018-12-07 RX ADMIN — HYDROMORPHONE HYDROCHLORIDE 4 MILLIGRAM(S): 2 INJECTION INTRAMUSCULAR; INTRAVENOUS; SUBCUTANEOUS at 10:45

## 2018-12-07 RX ADMIN — SENNA PLUS 2 TABLET(S): 8.6 TABLET ORAL at 22:22

## 2018-12-07 RX ADMIN — Medication 400 MILLIGRAM(S): at 05:58

## 2018-12-07 NOTE — PROGRESS NOTE ADULT - PROBLEM SELECTOR PLAN 1
1. Stop Robaxin  - Offer Zanaflex 2mg for spasms. Hold for oversedation.  2. Add Lidoderm Patches to lower back  3. Continue Decadron as ordered

## 2018-12-07 NOTE — PROGRESS NOTE ADULT - PROBLEM SELECTOR PLAN 3
1. Add Movantik daily  2. Add Senna at bedtime  3. Continue Colace as ordered  Pain service  [phone]  Text page via [email or web address]

## 2018-12-07 NOTE — CONSULT NOTE ADULT - SUBJECTIVE AND OBJECTIVE BOX
REASON FOR CONSULTATION    HPI:  57 y/o male came to ER for increasing low back pain for past 3 days to the point where he could not take pain any more and was having difficulty ambulating. pt. has been recently diagnosed with prostate cancer and he is following with urologist Dr. Franklin in [place]. and is scheduled to see Dr. Laureano at [facility] on 12/12/18 ( first appointment ). No fall, no trauma to back. no  loss of urine / fecal control. Pain is not travelling down his legs. No abd. pain, n/v/d, no cp, no sob. (06 Dec 2018 01:13)  Seen by Dr. King and palliative RT arranged. Has been started on Lupron and casodex.  No evidence of cord compression on scans, but diffuse skeletal metastasis.      REVIEW OF SYSTEMS:    CONSTITUTIONAL: No fever, weight loss, or fatigue  EYES: No eye pain, visual disturbances, or discharge  ENMT:  No difficulty hearing, tinnitus, vertigo; No sinus or throat pain  NECK: No pain or stiffness  BREASTS: No pain, masses, or nipple discharge  RESPIRATORY: No cough, wheezing, chills or hemoptysis; No shortness of breath  CARDIOVASCULAR: No chest pain, palpitations, dizziness, or leg swelling  GASTROINTESTINAL: No abdominal or epigastric pain. No nausea, vomiting, or hematemesis; No diarrhea or constipation. No melena or hematochezia.  GENITOURINARY: Diminished stream  NEUROLOGICAL: No headaches, memory loss, loss of strength, numbness, or tremors  SKIN: No itching, burning, rashes, or lesions   LYMPH NODES: No enlarged glands  ENDOCRINE: No heat or cold intolerance; No hair loss  MUSCULOSKELETAL: Low back pain, improved on analgesics.  PSYCHIATRIC: No depression, anxiety, mood swings, or difficulty sleeping  HEME/LYMPH: No easy bruising, or bleeding gums      PAST MEDICAL & SURGICAL HISTORY:  Prostate cancer  No significant past surgical history      SOCIAL HISTORY:    FAMILY HISTORY:  No pertinent family history in first degree relatives      SOCIAL HISTORY:    Allergies    No Known Allergies    Intolerances        MEDICATIONS  (STANDING):  acetaminophen   Tablet .. 975 milliGRAM(s) Oral every 8 hours  bicalutamide 50 milliGRAM(s) Oral daily  dexamethasone     Tablet 4 milliGRAM(s) Oral three times a day  docusate sodium 100 milliGRAM(s) Oral two times a day  fentaNYL   Patch  25 MICROgram(s)/Hr 1 Patch Transdermal every 72 hours  heparin  Injectable 5000 Unit(s) SubCutaneous every 12 hours  ketoconazole 400 milliGRAM(s) Oral every 8 hours  leuprolide depot Injectable (LUPRON-DEPOT) 7.5 milliGRAM(s) IntraMuscular once  lidocaine   Patch 2 Patch Transdermal daily  naloxegol 12.5 milliGRAM(s) Oral daily  senna 2 Tablet(s) Oral at bedtime  sodium chloride 0.9%. 1000 milliLiter(s) (84 mL/Hr) IV Continuous <Continuous>  tiZANidine 2 milliGRAM(s) Oral every 6 hours    MEDICATIONS  (PRN):  HYDROmorphone   Tablet 4 milliGRAM(s) Oral every 4 hours PRN Moderate Pain (4 - 6)  HYDROmorphone   Tablet 6 milliGRAM(s) Oral every 4 hours PRN Severe Pain (7 - 10)  ondansetron Injectable 4 milliGRAM(s) IV Push every 6 hours PRN Nausea and/or Vomiting      Vital Signs Last 24 Hrs  T(C): 36.4 (07 Dec 2018 07:28), Max: 36.8 (06 Dec 2018 23:08)  T(F): 97.6 (07 Dec 2018 07:28), Max: 98.3 (06 Dec 2018 23:08)  HR: 58 (07 Dec 2018 07:28) (58 - 61)  BP: 125/74 (07 Dec 2018 07:28) (115/63 - 125/74)  BP(mean): --  RR: 19 (07 Dec 2018 07:28) (18 - 19)  SpO2: 99% (06 Dec 2018 23:08) (99% - 99%)    PHYSICAL EXAM:    GENERAL: NAD, well-groomed, well-developed  HEAD:  Atraumatic, Normocephalic  EYES: EOMI, PERRLA, conjunctiva and sclera clear  NECK: Supple, No JVD, Normal thyroid  NERVOUS SYSTEM:  Alert & Oriented X3, Good concentration; Motor Strength 5/5 B/L upper and lower extremities; DTRs 2+ intact and symmetric  CHEST/LUNG: Clear to percussion bilaterally; No rales, rhonchi, wheezing, or rubs  HEART: Regular rate and rhythm; No murmurs, rubs, or gallops  ABDOMEN: Soft, Nontender, Nondistended; Bowel sounds present  EXTREMITIES:  2+ Peripheral Pulses, No clubbing, cyanosis, or edema  LYMPH: No lymphadenopathy noted  SKIN: No rashes or lesions  MS: Low back pain    LABS:                        12.6   11.1  )-----------( 242      ( 05 Dec 2018 20:14 )             38.6     12-07    140  |  102  |  29.0<H>  ----------------------------<  197<H>  5.3   |  25.0  |  1.82<H>    Ca    9.8      07 Dec 2018 09:26    TPro  7.6  /  Alb  3.8  /  TBili  0.3<L>  /  DBili  x   /  AST  14  /  ALT  7   /  AlkPhos  354<H>  12-05    PT/INR - ( 06 Dec 2018 04:13 )   PT: 12.0 sec;   INR: 1.04 ratio         PTT - ( 06 Dec 2018 04:13 )  PTT:31.3 sec

## 2018-12-07 NOTE — CONSULT NOTE ADULT - ASSESSMENT
Prostate cancer metastatic to bone, admitted with severe low back pain, now started on Lupron/casodex.  Pain better controlled on fentanyl 25 mcg, dilaudid 6 mg q4h.  Will require bisphosphonate therapy with Xgeva as well to manage painful bone mets.    Current standard of care for castration-naive metastatic prostate cancer is the combination of ADT (androgen deprivation therapy) plus either abiraterone (Zytiga) or chemotherapy using docetaxel (taxotere) q3w x 6 cycles.    Colt will be meeting with Dr. Laureano next week at [facility] and can discuss this issue further with him .  He lives in [place] and followup at [facility] was discussed, as it would be easier transportation-wise.    We will speak next week after his consult with Dr. Laureano.

## 2018-12-07 NOTE — PROGRESS NOTE ADULT - PROBLEM SELECTOR PLAN 2
1. Restart Duragesic at 25mcg/hr now  2. Offer an alternative higher dose of Dilaudid 6mg PO PRN severe pain  3. Add Tylenol TID x 2 days. PRN thereafter

## 2018-12-07 NOTE — PROGRESS NOTE ADULT - ATTENDING COMMENTS
Patient is examined on 12 7 approximately 5 PM. Overall patient appears to be doing much better in the last 24 hours with regard to pain control. I think pain management recommendation for an INR consult and transferred to [place] was against the services that can be provided here at [facility]. If patient progresses into pathologic compression fractures in the future that I think it may be reasonable for kyphoplasty at this point in time patient is comfortable with a TLSO brace he states he walked around the hospital for approximately 20 minutes with well-controlled pain. At this point in time I think it's more beneficial for this patient to have global systemic cancer treatment. Patient be followed throughout his hospital stay here at [facility] to make shows no acute orthopedic spine interventions including kyphoplasty that may be rendered.

## 2018-12-08 ENCOUNTER — OUTPATIENT (OUTPATIENT)
Dept: OUTPATIENT SERVICES | Facility: HOSPITAL | Age: 58
LOS: 1 days | Discharge: HOME | End: 2018-12-08

## 2018-12-08 DIAGNOSIS — C61 MALIGNANT NEOPLASM OF PROSTATE: ICD-10-CM

## 2018-12-08 PROCEDURE — 99233 SBSQ HOSP IP/OBS HIGH 50: CPT

## 2018-12-08 PROCEDURE — 99232 SBSQ HOSP IP/OBS MODERATE 35: CPT

## 2018-12-08 RX ADMIN — Medication 4 MILLIGRAM(S): at 15:16

## 2018-12-08 RX ADMIN — HEPARIN SODIUM 5000 UNIT(S): 5000 INJECTION INTRAVENOUS; SUBCUTANEOUS at 17:37

## 2018-12-08 RX ADMIN — Medication 975 MILLIGRAM(S): at 15:15

## 2018-12-08 RX ADMIN — Medication 4 MILLIGRAM(S): at 21:54

## 2018-12-08 RX ADMIN — SODIUM CHLORIDE 84 MILLILITER(S): 9 INJECTION INTRAMUSCULAR; INTRAVENOUS; SUBCUTANEOUS at 05:57

## 2018-12-08 RX ADMIN — Medication 400 MILLIGRAM(S): at 15:13

## 2018-12-08 RX ADMIN — TIZANIDINE 2 MILLIGRAM(S): 4 TABLET ORAL at 05:56

## 2018-12-08 RX ADMIN — LIDOCAINE 2 PATCH: 4 CREAM TOPICAL at 15:14

## 2018-12-08 RX ADMIN — Medication 975 MILLIGRAM(S): at 22:50

## 2018-12-08 RX ADMIN — Medication 975 MILLIGRAM(S): at 05:56

## 2018-12-08 RX ADMIN — BICALUTAMIDE 50 MILLIGRAM(S): 50 TABLET, FILM COATED ORAL at 15:14

## 2018-12-08 RX ADMIN — Medication 100 MILLIGRAM(S): at 15:16

## 2018-12-08 RX ADMIN — Medication 975 MILLIGRAM(S): at 16:00

## 2018-12-08 RX ADMIN — TIZANIDINE 2 MILLIGRAM(S): 4 TABLET ORAL at 11:05

## 2018-12-08 RX ADMIN — TIZANIDINE 2 MILLIGRAM(S): 4 TABLET ORAL at 15:17

## 2018-12-08 RX ADMIN — LIDOCAINE 2 PATCH: 4 CREAM TOPICAL at 19:59

## 2018-12-08 RX ADMIN — Medication 975 MILLIGRAM(S): at 06:58

## 2018-12-08 RX ADMIN — FENTANYL CITRATE 1 PATCH: 50 INJECTION INTRAVENOUS at 07:51

## 2018-12-08 RX ADMIN — NALOXEGOL OXALATE 12.5 MILLIGRAM(S): 12.5 TABLET, FILM COATED ORAL at 15:16

## 2018-12-08 RX ADMIN — Medication 400 MILLIGRAM(S): at 05:56

## 2018-12-08 RX ADMIN — Medication 975 MILLIGRAM(S): at 21:52

## 2018-12-08 RX ADMIN — Medication 100 MILLIGRAM(S): at 05:57

## 2018-12-08 RX ADMIN — HYDROMORPHONE HYDROCHLORIDE 6 MILLIGRAM(S): 2 INJECTION INTRAMUSCULAR; INTRAVENOUS; SUBCUTANEOUS at 18:15

## 2018-12-08 RX ADMIN — HYDROMORPHONE HYDROCHLORIDE 6 MILLIGRAM(S): 2 INJECTION INTRAMUSCULAR; INTRAVENOUS; SUBCUTANEOUS at 17:45

## 2018-12-08 RX ADMIN — LIDOCAINE 2 PATCH: 4 CREAM TOPICAL at 04:45

## 2018-12-08 RX ADMIN — HEPARIN SODIUM 5000 UNIT(S): 5000 INJECTION INTRAVENOUS; SUBCUTANEOUS at 05:56

## 2018-12-08 RX ADMIN — Medication 7.5 MILLIGRAM(S): at 11:03

## 2018-12-08 RX ADMIN — Medication 400 MILLIGRAM(S): at 21:52

## 2018-12-08 RX ADMIN — TIZANIDINE 2 MILLIGRAM(S): 4 TABLET ORAL at 21:55

## 2018-12-08 RX ADMIN — FENTANYL CITRATE 1 PATCH: 50 INJECTION INTRAVENOUS at 21:27

## 2018-12-08 RX ADMIN — Medication 4 MILLIGRAM(S): at 05:56

## 2018-12-08 NOTE — PROGRESS NOTE ADULT - ATTENDING COMMENTS
Patient is seen and evaluated on 12 8 2018 is currently ablating the floors of physical therapy at approximately noon. Patient states his pain is significantly improved he has no thoracic or lumbar complaints of radiculopathy and no complaints of weakness. At this point in time based upon this improved pain profile based upon his improved functionality is up and plating with a TLSO brace I think at this point in time it's much more prudent to focus on systemic cancer therapy. The patient is discharged in approximately 24 hours of I. to see this gentleman in my office in approximately one week to make sure he is progressing well with pain control and functionality.

## 2018-12-08 NOTE — PHYSICAL THERAPY INITIAL EVALUATION ADULT - PLANNED THERAPY INTERVENTIONS, PT EVAL
Patient had maximal functional independence , no skilled PT is indicated at this time. Patient requires to wear TLSO when OOB.

## 2018-12-09 VITALS — HEART RATE: 97 BPM | RESPIRATION RATE: 18 BRPM | TEMPERATURE: 99 F | OXYGEN SATURATION: 98 %

## 2018-12-09 LAB
ANION GAP SERPL CALC-SCNC: 11 MMOL/L — SIGNIFICANT CHANGE UP (ref 5–17)
BUN SERPL-MCNC: 32 MG/DL — HIGH (ref 8–20)
CALCIUM SERPL-MCNC: 9.1 MG/DL — SIGNIFICANT CHANGE UP (ref 8.6–10.2)
CHLORIDE SERPL-SCNC: 103 MMOL/L — SIGNIFICANT CHANGE UP (ref 98–107)
CO2 SERPL-SCNC: 24 MMOL/L — SIGNIFICANT CHANGE UP (ref 22–29)
CREAT SERPL-MCNC: 1.69 MG/DL — HIGH (ref 0.5–1.3)
GLUCOSE SERPL-MCNC: 124 MG/DL — HIGH (ref 70–115)
HCT VFR BLD CALC: 36 % — LOW (ref 42–52)
HGB BLD-MCNC: 11.6 G/DL — LOW (ref 14–18)
MCHC RBC-ENTMCNC: 25.4 PG — LOW (ref 27–31)
MCHC RBC-ENTMCNC: 32.2 G/DL — SIGNIFICANT CHANGE UP (ref 32–36)
MCV RBC AUTO: 78.9 FL — LOW (ref 80–94)
PLATELET # BLD AUTO: 224 K/UL — SIGNIFICANT CHANGE UP (ref 150–400)
POTASSIUM SERPL-MCNC: 4.7 MMOL/L — SIGNIFICANT CHANGE UP (ref 3.5–5.3)
POTASSIUM SERPL-SCNC: 4.7 MMOL/L — SIGNIFICANT CHANGE UP (ref 3.5–5.3)
RBC # BLD: 4.56 M/UL — LOW (ref 4.6–6.2)
RBC # FLD: 14.8 % — SIGNIFICANT CHANGE UP (ref 11–15.6)
SODIUM SERPL-SCNC: 138 MMOL/L — SIGNIFICANT CHANGE UP (ref 135–145)
WBC # BLD: 14.7 K/UL — HIGH (ref 4.8–10.8)
WBC # FLD AUTO: 14.7 K/UL — HIGH (ref 4.8–10.8)

## 2018-12-09 PROCEDURE — 72146 MRI CHEST SPINE W/O DYE: CPT

## 2018-12-09 PROCEDURE — 85730 THROMBOPLASTIN TIME PARTIAL: CPT

## 2018-12-09 PROCEDURE — 85027 COMPLETE CBC AUTOMATED: CPT

## 2018-12-09 PROCEDURE — 71045 X-RAY EXAM CHEST 1 VIEW: CPT

## 2018-12-09 PROCEDURE — 76700 US EXAM ABDOM COMPLETE: CPT

## 2018-12-09 PROCEDURE — 96361 HYDRATE IV INFUSION ADD-ON: CPT

## 2018-12-09 PROCEDURE — 99239 HOSP IP/OBS DSCHRG MGMT >30: CPT

## 2018-12-09 PROCEDURE — 96375 TX/PRO/DX INJ NEW DRUG ADDON: CPT

## 2018-12-09 PROCEDURE — 97163 PT EVAL HIGH COMPLEX 45 MIN: CPT

## 2018-12-09 PROCEDURE — 96365 THER/PROPH/DIAG IV INF INIT: CPT

## 2018-12-09 PROCEDURE — 81001 URINALYSIS AUTO W/SCOPE: CPT

## 2018-12-09 PROCEDURE — 80048 BASIC METABOLIC PNL TOTAL CA: CPT

## 2018-12-09 PROCEDURE — 72141 MRI NECK SPINE W/O DYE: CPT

## 2018-12-09 PROCEDURE — 72148 MRI LUMBAR SPINE W/O DYE: CPT

## 2018-12-09 PROCEDURE — 93005 ELECTROCARDIOGRAM TRACING: CPT

## 2018-12-09 PROCEDURE — 80053 COMPREHEN METABOLIC PANEL: CPT

## 2018-12-09 PROCEDURE — 85610 PROTHROMBIN TIME: CPT

## 2018-12-09 PROCEDURE — 72131 CT LUMBAR SPINE W/O DYE: CPT

## 2018-12-09 PROCEDURE — 99233 SBSQ HOSP IP/OBS HIGH 50: CPT

## 2018-12-09 PROCEDURE — 99285 EMERGENCY DEPT VISIT HI MDM: CPT | Mod: 25

## 2018-12-09 PROCEDURE — 83690 ASSAY OF LIPASE: CPT

## 2018-12-09 PROCEDURE — 36415 COLL VENOUS BLD VENIPUNCTURE: CPT

## 2018-12-09 PROCEDURE — 72128 CT CHEST SPINE W/O DYE: CPT

## 2018-12-09 RX ORDER — KETOCONAZOLE 200 MG
2 TABLET ORAL
Qty: 60 | Refills: 0
Start: 2018-12-09

## 2018-12-09 RX ORDER — PANTOPRAZOLE SODIUM 20 MG/1
40 TABLET, DELAYED RELEASE ORAL
Qty: 0 | Refills: 0 | Status: DISCONTINUED | OUTPATIENT
Start: 2018-12-09 | End: 2018-12-09

## 2018-12-09 RX ORDER — POLYETHYLENE GLYCOL 3350 17 G/17G
17 POWDER, FOR SOLUTION ORAL
Qty: 340 | Refills: 0
Start: 2018-12-09

## 2018-12-09 RX ORDER — METHOCARBAMOL 500 MG/1
1 TABLET, FILM COATED ORAL
Qty: 90 | Refills: 0
Start: 2018-12-09

## 2018-12-09 RX ORDER — POLYETHYLENE GLYCOL 3350 17 G/17G
17 POWDER, FOR SOLUTION ORAL
Qty: 0 | Refills: 0 | Status: DISCONTINUED | OUTPATIENT
Start: 2018-12-09 | End: 2018-12-09

## 2018-12-09 RX ORDER — ONDANSETRON 8 MG/1
1 TABLET, FILM COATED ORAL
Qty: 30 | Refills: 0
Start: 2018-12-09

## 2018-12-09 RX ORDER — HYDROMORPHONE HYDROCHLORIDE 2 MG/ML
1 INJECTION INTRAMUSCULAR; INTRAVENOUS; SUBCUTANEOUS
Qty: 60 | Refills: 0
Start: 2018-12-09

## 2018-12-09 RX ORDER — PANTOPRAZOLE SODIUM 20 MG/1
1 TABLET, DELAYED RELEASE ORAL
Qty: 30 | Refills: 0
Start: 2018-12-09

## 2018-12-09 RX ORDER — DEXAMETHASONE 0.5 MG/5ML
1 ELIXIR ORAL
Qty: 60 | Refills: 0
Start: 2018-12-09

## 2018-12-09 RX ORDER — DOCUSATE SODIUM 100 MG
1 CAPSULE ORAL
Qty: 90 | Refills: 0
Start: 2018-12-09

## 2018-12-09 RX ORDER — SENNA PLUS 8.6 MG/1
2 TABLET ORAL
Qty: 60 | Refills: 0
Start: 2018-12-09

## 2018-12-09 RX ADMIN — SODIUM CHLORIDE 84 MILLILITER(S): 9 INJECTION INTRAMUSCULAR; INTRAVENOUS; SUBCUTANEOUS at 13:41

## 2018-12-09 RX ADMIN — TIZANIDINE 2 MILLIGRAM(S): 4 TABLET ORAL at 13:35

## 2018-12-09 RX ADMIN — SODIUM CHLORIDE 84 MILLILITER(S): 9 INJECTION INTRAMUSCULAR; INTRAVENOUS; SUBCUTANEOUS at 06:01

## 2018-12-09 RX ADMIN — Medication 975 MILLIGRAM(S): at 06:02

## 2018-12-09 RX ADMIN — LIDOCAINE 2 PATCH: 4 CREAM TOPICAL at 13:33

## 2018-12-09 RX ADMIN — Medication 400 MILLIGRAM(S): at 13:35

## 2018-12-09 RX ADMIN — NALOXEGOL OXALATE 12.5 MILLIGRAM(S): 12.5 TABLET, FILM COATED ORAL at 13:34

## 2018-12-09 RX ADMIN — BICALUTAMIDE 50 MILLIGRAM(S): 50 TABLET, FILM COATED ORAL at 13:35

## 2018-12-09 RX ADMIN — HEPARIN SODIUM 5000 UNIT(S): 5000 INJECTION INTRAVENOUS; SUBCUTANEOUS at 06:01

## 2018-12-09 RX ADMIN — Medication 4 MILLIGRAM(S): at 13:34

## 2018-12-09 RX ADMIN — LIDOCAINE 2 PATCH: 4 CREAM TOPICAL at 02:59

## 2018-12-09 RX ADMIN — Medication 400 MILLIGRAM(S): at 06:01

## 2018-12-09 RX ADMIN — Medication 4 MILLIGRAM(S): at 06:01

## 2018-12-09 RX ADMIN — TIZANIDINE 2 MILLIGRAM(S): 4 TABLET ORAL at 06:01

## 2018-12-09 NOTE — PROGRESS NOTE ADULT - PROVIDER SPECIALTY LIST ADULT
Hospitalist
Orthopedics
Pain Medicine
Pain Medicine
Hospitalist

## 2018-12-09 NOTE — PROGRESS NOTE ADULT - SUBJECTIVE AND OBJECTIVE BOX
CHRIS QUINTANA    [id number]    58y      Male    CC: back pain    INTERVAL HPI/OVERNIGHT EVENTS:  was seen and examined at bedside. back pain been same since yesterday but improved since admission. was seen by urology and started on Leuprolide, went for mapping today for radiation for back pain. Pain management initially consulted IR for Kyphoplasty, but patient refused to go to [facility] for the procedure after speaking to ortho and urology. Ortho recommended no surgical intervention and ordered TLSO brace. Awaiting PT evaluation.      Vital Signs Last 24 Hrs  T(C): 36.4 (07 Dec 2018 07:28), Max: 36.8 (06 Dec 2018 23:08)  T(F): 97.6 (07 Dec 2018 07:28), Max: 98.3 (06 Dec 2018 23:08)  HR: 58 (07 Dec 2018 07:28) (58 - 67)  BP: 125/74 (07 Dec 2018 07:28) (107/64 - 125/74)  BP(mean): --  RR: 19 (07 Dec 2018 07:28) (18 - 19)  SpO2: 99% (06 Dec 2018 23:08) (95% - 99%)    PHYSICAL EXAM:    GENERAL: NAD, well-groomed  HEENT: PERRL, +EOMI  NECK: soft, Supple, No JVD,   CHEST/LUNG: Clear to auscultation bilaterally; No wheezing  HEART: S1S2+, Regular rate and rhythm; No murmurs, rubs, or gallops  ABDOMEN: Soft, Nontender, Nondistended; Bowel sounds present  EXTREMITIES:  2+ Peripheral Pulses, No clubbing, cyanosis, or edema  SKIN: No rashes or lesions  NEURO: AAOX3, no focal deficits, no motor r sensory loss  PSYCH: normal mood      LABS:                        12.6   11.1  )-----------( 242      ( 05 Dec 2018 20:14 )             38.6     12-07    140  |  102  |  29.0<H>  ----------------------------<  197<H>  5.3   |  25.0  |  1.82<H>    Ca    9.8      07 Dec 2018 09:26    TPro  7.6  /  Alb  3.8  /  TBili  0.3<L>  /  DBili  x   /  AST  14  /  ALT  7   /  AlkPhos  354<H>  12-05    PT/INR - ( 06 Dec 2018 04:13 )   PT: 12.0 sec;   INR: 1.04 ratio         PTT - ( 06 Dec 2018 04:13 )  PTT:31.3 sec  Urinalysis Basic - ( 06 Dec 2018 02:20 )    Color: Yellow / Appearance: Clear / S.025 / pH: x  Gluc: x / Ketone: Negative  / Bili: Negative / Urobili: Negative mg/dL   Blood: x / Protein: 15 mg/dL / Nitrite: Negative   Leuk Esterase: Negative / RBC: 0-2 /HPF / WBC Negative   Sq Epi: x / Non Sq Epi: Occasional / Bacteria: x          MEDICATIONS  (STANDING):  bicalutamide 50 milliGRAM(s) Oral daily  dexamethasone     Tablet 4 milliGRAM(s) Oral three times a day  docusate sodium 100 milliGRAM(s) Oral two times a day  fentaNYL   Patch  25 MICROgram(s)/Hr 1 Patch Transdermal every 72 hours  heparin  Injectable 5000 Unit(s) SubCutaneous every 12 hours  ketoconazole 400 milliGRAM(s) Oral every 8 hours  leuprolide depot Injectable (LUPRON-DEPOT) 7.5 milliGRAM(s) IntraMuscular once  naloxegol 12.5 milliGRAM(s) Oral daily  sodium chloride 0.9%. 1000 milliLiter(s) (84 mL/Hr) IV Continuous <Continuous>  tiZANidine 2 milliGRAM(s) Oral every 6 hours    MEDICATIONS  (PRN):  HYDROmorphone   Tablet 4 milliGRAM(s) Oral every 4 hours PRN Moderate Pain (4 - 6)  HYDROmorphone   Tablet 6 milliGRAM(s) Oral every 4 hours PRN Severe Pain (7 - 10)  ondansetron Injectable 4 milliGRAM(s) IV Push every 6 hours PRN Nausea and/or Vomiting      RADIOLOGY & ADDITIONAL TESTS:
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  Pt reports improvement of lower back pain, Describes pain as localized and non-radiating  Denies numbness/ tingling, weakness, loss of bowel or bladder function  No new complaints     Vital Signs Last 24 Hrs  T(C): 36.4 (07 Dec 2018 07:28), Max: 36.8 (06 Dec 2018 23:08)  T(F): 97.6 (07 Dec 2018 07:28), Max: 98.3 (06 Dec 2018 23:08)  HR: 58 (07 Dec 2018 07:28) (58 - 67)  BP: 125/74 (07 Dec 2018 07:28) (107/64 - 125/74)  BP(mean): --  RR: 19 (07 Dec 2018 07:28) (18 - 19)  SpO2: 99% (06 Dec 2018 23:08) (95% - 99%)    Constitutional:Alert, responsive, in no acute distress.     Abdominal: soft and supple non distended    Lympatics: no pretibial pitting edema    Spine:            Sensation to light touch          B/L Upper extremity                ax        mc           m          u          r                                                         R          +           +             +           +          +                                               L           +           +             +           +          +         B/L Lower extremity             sp         dp         saph       barnes         tibial                                                R          +           +             +           +          +                                               L           +           +             +           +          +           Pathologic reflexes: Neg Dawn,  No  Clonus              Motor exam:           B/L Upper extremity              Bi(c5)  WE(c6)  EE(c7)   FF(c8)                                                R         5/5        5/5        5/5       5/5                                               L          5/5        5/5        5/5       5/5         B/L Lower extremity          HF(l2)   KE(l3)    TA(l4)   EHL(l5)  GS(s1)                                                 R        5/5        5/5        5/5       5/5         5/5                                               L         5/5        5/5       5/5       5/5          5/5                                                      Vascular:  B/L upper extremities warm well perfused; capillary refill <3 seconds                                                                    B/L lower extremities warm well perfused; capillary refill <3 seconds         A/P: Prostate CA with spine mets     - Rad/Onc, Urology, Medicine notes appreciated  - No acute surgical Ortho spine intervention  - Pain control  - Cervical/ Thoracic/ Lumbar MRI's reviewed by Dr. Flores  - TLSO ordered for comfort when OOB  - Continue care per primary team  - May follow up when pain controlled with Dr. Flores as outpatient
58 y.o. male admitted due to uncontrolled low back pain, +prostate cancer now found with metastases to spinal vertebrae.  Colt is ambulating with guarded fashion in his hospital room but reports he feels much more stabilized with current pain medication.  He states prior side effects of nausea, constipation and fatigue occurring with prior meds no longer present.  He continues to have pain but it is at least 50% reduced and "I at least feel like I can take care of myself".  Fentanyl patch is 25mcg, dilaudid oral dose prn up to 6mg.      ROS:  pain in low back with some radiation upward toward thoracic spine.  PE:  Alert, awake, VSS, pleasant and appropriate gentleman, ambulating steadily but slowly, noted with some pain on position changes.      Impression:  metastatic prostate cancer, lumbar vertebral mets  Plan:  Continue present regimen.           Patient understands adjustments can be made if necessary.           Will continue to follow prn.  -BSshalonda FNP-C
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  Pt reports continued improvement of lower back pain, Has TLSO brace at bedside  Denies numbness/ tingling, weakness, loss of bowel or bladder function  No new complaints     Vital Signs Last 24 Hrs  T(C): 37 (08 Dec 2018 09:29), Max: 37.1 (07 Dec 2018 19:05)  T(F): 98.6 (08 Dec 2018 09:29), Max: 98.7 (07 Dec 2018 19:05)  HR: 56 (08 Dec 2018 09:29) (56 - 64)  BP: 108/59 (08 Dec 2018 09:29) (108/59 - 126/57)  BP(mean): --  RR: 18 (08 Dec 2018 09:29) (18 - 18)  SpO2: 96% (08 Dec 2018 09:29) (96% - 99%)    Constitutional: Alert, responsive, in no acute distress.     Abdominal: soft and supple non distended    Lymphatics: no pretibial pitting edema    Spine:            Sensation to light touch          B/L Upper extremity                ax        mc           m          u          r                                                         R          +           +             +           +          +                                               L           +           +             +           +          +         B/L Lower extremity             sp         dp         saph       barnes         tibial                                                R          +           +             +           +          +                                               L           +           +             +           +          +           Pathologic reflexes: Neg Dawn, No  Clonus              Motor exam:           B/L Upper extremity              Bi(c5)  WE(c6)  EE(c7)   FF(c8)                                                R         5/5        5/5        5/5       5/5                                               L          5/5        5/5        5/5       5/5         B/L Lower extremity          HF(l2)   KE(l3)    TA(l4)   EHL(l5)  GS(s1)                                                 R        5/5        5/5        5/5       5/5         5/5                                               L         5/5        5/5       5/5       5/5          5/5                                                      Vascular:  B/L upper extremities warm well perfused; capillary refill <3 seconds                                                                    B/L lower extremities warm well perfused; capillary refill <3 seconds         A/P: Prostate CA with spine mets    - Pain control  - Continue PT- WBAT  - TLSO for comfort when OOB  - Continue care per primary team  - Ortho to follow
CHRIS QUINTANA    [id number]    58y      Male    CC: back pain    INTERVAL HPI/OVERNIGHT EVENTS:  was seen and examined at bedside.  BACK pain is better than yesterday with dilaudid. Deneid any weakness,numbness and tingling and urine incontinence. Mentioned he was recently diagnosed with Prostrate cancer with PSA 1800, discussed with pcp [phone] and confirmed the same findings. He has appt with dr kiser on 13 dec. DR KISER AND UROLOGY consulted. been seen by pain management today.          Vital Signs Last 24 Hrs  T(C): 36.7 (06 Dec 2018 06:01), Max: 36.7 (06 Dec 2018 06:01)  T(F): 98.1 (06 Dec 2018 06:01), Max: 98.1 (06 Dec 2018 06:01)  HR: 65 (06 Dec 2018 06:01) (57 - 74)  BP: 114/66 (06 Dec 2018 06:01) (114/66 - 135/76)  BP(mean): --  RR: 18 (06 Dec 2018 06:01) (18 - 20)  SpO2: 97% (06 Dec 2018 06:01) (95% - 97%)    PHYSICAL EXAM:    GENERAL: NAD, well-groomed  HEENT: PERRL, +EOMI  NECK: soft, Supple, No JVD,   CHEST/LUNG: Clear to auscultation bilaterally; No wheezing  HEART: S1S2+, Regular rate and rhythm; No murmurs, rubs, or gallops  ABDOMEN: Soft, Nontender, Nondistended; Bowel sounds present  EXTREMITIES:  2+ Peripheral Pulses, No clubbing, cyanosis, or edema  SKIN: No rashes or lesions  NEURO: AAOX3, no focal deficits, no motor r sensory loss  PSYCH: normal mood      LABS:                        12.6   11.1  )-----------( 242      ( 05 Dec 2018 20:14 )             38.6     12-06    136  |  100  |  29.0<H>  ----------------------------<  125<H>  5.1   |  26.0  |  1.76<H>    Ca    10.4<H>      06 Dec 2018 07:45    TPro  7.6  /  Alb  3.8  /  TBili  0.3<L>  /  DBili  x   /  AST  14  /  ALT  7   /  AlkPhos  354<H>  12-05    PT/INR - ( 06 Dec 2018 04:13 )   PT: 12.0 sec;   INR: 1.04 ratio         PTT - ( 06 Dec 2018 04:13 )  PTT:31.3 sec  Urinalysis Basic - ( 06 Dec 2018 02:20 )    Color: Yellow / Appearance: Clear / S.025 / pH: x  Gluc: x / Ketone: Negative  / Bili: Negative / Urobili: Negative mg/dL   Blood: x / Protein: 15 mg/dL / Nitrite: Negative   Leuk Esterase: Negative / RBC: 0-2 /HPF / WBC Negative   Sq Epi: x / Non Sq Epi: Occasional / Bacteria: x          MEDICATIONS  (STANDING):  bicalutamide 50 milliGRAM(s) Oral daily  dexamethasone     Tablet 4 milliGRAM(s) Oral three times a day  docusate sodium 100 milliGRAM(s) Oral two times a day  heparin  Injectable 5000 Unit(s) SubCutaneous every 12 hours  sodium chloride 0.9%. 1000 milliLiter(s) (84 mL/Hr) IV Continuous <Continuous>    MEDICATIONS  (PRN):  HYDROmorphone   Tablet 4 milliGRAM(s) Oral every 4 hours PRN Moderate Pain (4 - 6)  methocarbamol 750 milliGRAM(s) Oral three times a day PRN Muscle Spasm  ondansetron Injectable 4 milliGRAM(s) IV Push every 6 hours PRN Nausea and/or Vomiting      RADIOLOGY & ADDITIONAL TESTS:
Patient is a 58y old  Male who presents with a chief complaint of back pain. He was recently diagnosed with prostate cancer and is following with urologist Dr. Franklin in [place] (06 Dec 2018 01:13). He was found to have spinal metastasis to thoracic and lumbar spine. He has chosen no surgical intervention at this time.      VERBAL REPORT: "I just want to be able to manage at home with the pain medications."  The patient continues to have mid-lower back pain. He reports 1-2 hours relief with Dilaudid tablets. He is uncertain if the Robaxin helps. He also reports use of a Fentanyl patch at home prior to this admission. Patient adds that he's constipated, but was able to go a little this morning.  PAIN SCORE: 5/10                  SCALE USED: VNRS    Allergies  No Known Allergies      PAST MEDICAL & SURGICAL HISTORY:  Prostate cancer  No significant past surgical history      MEDICATIONS  (STANDING):  bicalutamide 50 milliGRAM(s) Oral daily  dexamethasone     Tablet 4 milliGRAM(s) Oral three times a day  docusate sodium 100 milliGRAM(s) Oral two times a day  fentaNYL   Patch  25 MICROgram(s)/Hr 1 Patch Transdermal every 72 hours  heparin  Injectable 5000 Unit(s) SubCutaneous every 12 hours  ketoconazole 400 milliGRAM(s) Oral every 8 hours  leuprolide depot Injectable (LUPRON-DEPOT) 7.5 milliGRAM(s) IntraMuscular once  naloxegol 12.5 milliGRAM(s) Oral daily  sodium chloride 0.9%. 1000 milliLiter(s) (84 mL/Hr) IV Continuous <Continuous>  tiZANidine 2 milliGRAM(s) Oral every 6 hours    MEDICATIONS  (PRN):  HYDROmorphone   Tablet 4 milliGRAM(s) Oral every 4 hours PRN Moderate Pain (4 - 6)  HYDROmorphone   Tablet 6 milliGRAM(s) Oral every 4 hours PRN Severe Pain (7 - 10)  ondansetron Injectable 4 milliGRAM(s) IV Push every 6 hours PRN Nausea and/or Vomiting      PHYSICAL EXAM:  GENERAL: NAD, well-groomed, well-developed  HEAD:  Atraumatic, Normocephalic  EYES: EOMI, PERRLA, conjunctiva and sclera clear  NERVOUS SYSTEM: Alert & Oriented X3, Good concentration; Motor Strength 5/5 B/L upper and lower extremities  CHEST/LUNG: Clear speech, no SOB  ABDOMEN: Flat; Nontender      Vital Signs Last 24 Hrs  T(C): 36.4 (07 Dec 2018 07:28), Max: 36.8 (06 Dec 2018 23:08)  T(F): 97.6 (07 Dec 2018 07:28), Max: 98.3 (06 Dec 2018 23:08)  HR: 58 (07 Dec 2018 07:28) (58 - 67)  BP: 125/74 (07 Dec 2018 07:28) (107/64 - 125/74)  BP(mean): --  RR: 19 (07 Dec 2018 07:28) (18 - 19)  SpO2: 99% (06 Dec 2018 23:08) (95% - 99%)                          12.6   11.1  )-----------( 242      ( 05 Dec 2018 20:14 )             38.6       LIVER FUNCTIONS - ( 05 Dec 2018 20:14 )  Alb: 3.8 g/dL / Pro: 7.6 g/dL / ALK PHOS: 354 U/L / ALT: 7 U/L / AST: 14 U/L / GGT: x             PT/INR - ( 06 Dec 2018 04:13 )   PT: 12.0 sec;   INR: 1.04 ratio    PTT - ( 06 Dec 2018 04:13 )  PTT:31.3 sec
Patient is doing well he is seen ambulating this morning. He states his back pain is significantly improved. I'm happy of this gentleman is progress with his pain at this point in time.    Physical exam has no pathologic reflexes his lower extremity motor strength is 4+ out of 5 as well as his upper extremity strength and patient has no acute pathologic reflexes or radiculopathy.    His impression metastatic prostate cancer    His plan I recommend initiation of systemic therapy as well as radiation and close orthopedic followup in approximately one week the patient started developing spinal canal compromise or instability at these fractures that it would be reasonable to consider surgical options.
Patient: CHRIS QUINTANA [id number] 58y Male                           Internal Medicine Hospitalist Progress Note    Initial HPI:  57 y/o male with recently diagnosed prostrate cancer came to ER for increasing low back pain for 3 days found to have metastatic disease to spine.  Evaluated by ortho and radiation oncology.  Recommended TLSO brace, underwent mapping for RT.  Seen by pain management for pain control.    Pt reports ambulating with PT, denies weakness / numbness / incontinence.  + Mild Constipation.  No additional complaints.     ____________________PHYSICAL EXAM:  Vitals reviewed as indicated below  GENERAL:  NAD Alert and Oriented x 3   HEENT: NCAT  CARDIOVASCULAR:  S1, S2  LUNGS: CTAB  ABDOMEN:  soft, (-) tenderness, (-) distension, (+) bowel sounds, (-) guarding, (-) rebound (-) rigidity  EXTREMITIES:  no cyanosis / clubbing / edema.   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: Pain due to malignant neoplasm metastatic to bone  Compression fracture of spine: Compression fracture of spine  Renal insufficiency: Renal insufficiency  Prostate cancer: Prostate cancer  Intractable back pain: Intractable back pain        Allergies    No Known Allergies    Intolerances      PAST MEDICAL & SURGICAL HISTORY:  Prostate cancer  No significant past surgical history        VITALS:  Vital Signs Last 24 Hrs  T(C): 37 (08 Dec 2018 09:29), Max: 37.1 (07 Dec 2018 19:05)  T(F): 98.6 (08 Dec 2018 09:29), Max: 98.7 (07 Dec 2018 19:05)  HR: 56 (08 Dec 2018 09:29) (56 - 64)  BP: 108/59 (08 Dec 2018 09:29) (108/59 - 126/57)  BP(mean): --  RR: 18 (08 Dec 2018 09:29) (18 - 18)  SpO2: 96% (08 Dec 2018 09:29) (96% - 99%) Daily     Daily   CAPILLARY BLOOD GLUCOSE        I&O's Summary    07 Dec 2018 07:01  -  08 Dec 2018 07:00  --------------------------------------------------------  IN: 0 mL / OUT: 750 mL / NET: -750 mL          LABS:    12-07    140  |  102  |  29.0<H>  ----------------------------<  197<H>  5.3   |  25.0  |  1.82<H>    Ca    9.8      07 Dec 2018 09:26                    MEDICATIONS:  MEDICATIONS  (STANDING):  acetaminophen   Tablet .. 975 milliGRAM(s) Oral every 8 hours  bicalutamide 50 milliGRAM(s) Oral daily  dexamethasone     Tablet 4 milliGRAM(s) Oral three times a day  docusate sodium 100 milliGRAM(s) Oral two times a day  fentaNYL   Patch  25 MICROgram(s)/Hr 1 Patch Transdermal every 72 hours  heparin  Injectable 5000 Unit(s) SubCutaneous every 12 hours  ketoconazole 400 milliGRAM(s) Oral every 8 hours  lidocaine   Patch 2 Patch Transdermal daily  naloxegol 12.5 milliGRAM(s) Oral daily  senna 2 Tablet(s) Oral at bedtime  sodium chloride 0.9%. 1000 milliLiter(s) (84 mL/Hr) IV Continuous <Continuous>  tiZANidine 2 milliGRAM(s) Oral every 6 hours    MEDICATIONS  (PRN):  HYDROmorphone   Tablet 4 milliGRAM(s) Oral every 4 hours PRN Moderate Pain (4 - 6)  HYDROmorphone   Tablet 6 milliGRAM(s) Oral every 4 hours PRN Severe Pain (7 - 10)  ondansetron Injectable 4 milliGRAM(s) IV Push every 6 hours PRN Nausea and/or Vomiting
Patient: CHRIS QUINTANA [id number] 58y Male                           Internal Medicine Hospitalist Progress Note    Initial HPI:  57 y/o male with recently diagnosed prostrate cancer came to ER for increasing low back pain for 3 days found to have metastatic disease to spine.  Evaluated by ortho and radiation oncology.  Recommended TLSO brace, underwent mapping for RT.  Seen by pain management for pain control.    Pain remains well controlled.  Required some Dilaudid overnight.  + BM.  No weakness / numbness / incontinence.  Wearing TLSO brace.      ____________________PHYSICAL EXAM:  Vitals reviewed as indicated below  GENERAL:  NAD Alert and Oriented x 3   HEENT: NCAT  CARDIOVASCULAR:  S1, S2  LUNGS: CTAB  ABDOMEN:  soft, (-) tenderness, (-) distension, (+) bowel sounds, (-) guarding, (-) rebound (-) rigidity  EXTREMITIES:  no cyanosis / clubbing / edema.   ____________________    VITALS:  Vital Signs Last 24 Hrs  T(C): 36.4 (09 Dec 2018 08:47), Max: 37 (08 Dec 2018 09:29)  T(F): 97.5 (09 Dec 2018 08:47), Max: 98.6 (08 Dec 2018 09:29)  HR: 51 (09 Dec 2018 08:47) (51 - 57)  BP: 103/50 (09 Dec 2018 08:47) (103/50 - 128/70)  BP(mean): --  RR: 18 (09 Dec 2018 08:47) (18 - 19)  SpO2: 96% (09 Dec 2018 08:47) (96% - 97%) Daily     Daily   CAPILLARY BLOOD GLUCOSE        I&O's Summary      LABS:                        11.6   14.7  )-----------( 224      ( 09 Dec 2018 07:53 )             36.0     12-09    138  |  103  |  32.0<H>  ----------------------------<  124<H>  4.7   |  24.0  |  1.69<H>    Ca    9.1      09 Dec 2018 07:53                  MEDICATIONS:  bicalutamide 50 milliGRAM(s) Oral daily  dexamethasone     Tablet 4 milliGRAM(s) Oral three times a day  docusate sodium 100 milliGRAM(s) Oral two times a day  fentaNYL   Patch  25 MICROgram(s)/Hr 1 Patch Transdermal every 72 hours  heparin  Injectable 5000 Unit(s) SubCutaneous every 12 hours  HYDROmorphone   Tablet 4 milliGRAM(s) Oral every 4 hours PRN  HYDROmorphone   Tablet 6 milliGRAM(s) Oral every 4 hours PRN  ketoconazole 400 milliGRAM(s) Oral every 8 hours  lidocaine   Patch 2 Patch Transdermal daily  naloxegol 12.5 milliGRAM(s) Oral daily  ondansetron Injectable 4 milliGRAM(s) IV Push every 6 hours PRN  pantoprazole    Tablet 40 milliGRAM(s) Oral before breakfast  polyethylene glycol 3350 17 Gram(s) Oral two times a day PRN  senna 2 Tablet(s) Oral at bedtime  sodium chloride 0.9%. 1000 milliLiter(s) IV Continuous <Continuous>  tiZANidine 2 milliGRAM(s) Oral every 6 hours

## 2018-12-09 NOTE — PROGRESS NOTE ADULT - ASSESSMENT
Assessment and Plan:   59 y/o male with recently diagnosed prostrate cancer came to ER for increasing low back pain for past 3 days to the point where he could not take pain any more and was having difficulty ambulating found to have mets to spine but no compression fracture in MRI spine. Ortho mentioned no acute surgical intervention. [facility] onc and urology consulted. alos found to have PATRICK on admission. Needs TLSO brace and PT evaluation.    Assessment and Plan:    Problem/Plan - 1:  A/P: Prostrate ca with extensive mets to spine:  MRI spine results noted, no evidence of compression fracture and cord impingement  no acute intervention by orthopedic  TLSO brace on resting, ortho is arranging for that  - Pain medication note appreciated started on Tizanidine, naloxegol, prednisone, fentanyl patch with prn diluaidid  [facility] on note appreciated. Went for radiation mapping today. Needs radiation for back pain, which can be done as an outpatient  Urology note appreciated, started on Ketoconazole and Leuprolide for testosterone suppression. f/u with oncology as an outpatient  - Physical Therapy Consulted today         Problem/Plan - 2:  ·  Problem: Renal insufficiency.    Plan: no old labs for comparison, patrick ? ATN ???likely due to taking NSAIDS as an outpatient. Looks like patient has CKD stage based on Renal US results and no improvement in BUN/CR after hydration  Renal US showed medical renal disease with mild hydronephrosis of left kidney  continue IV fluids for today  Nephrology follow up as an outpatient    Elevated ALP:  US abdomen benign CBD 3 mm    DISPO:  once pain is better controlled, TLSO brace and PT evaluation.  can get radiation as an outpatient
57 y/o male came to ER for increasing low back pain for past 3 days to the point where he could not take pain any more and was having difficulty ambulating. pt. has been recently diagnosed with prostate cancer and he is following with urologist dr. den[place] and was supposed to see oncologist in [place] as [place] on 12/12/18 ( first appointment ) no fall, no trauma to back. no  loss of urine / fecal control. pain is not travelling down his legs. no abd. pain. no n/v/d. no cp. no sob. found to have compression fracture. Orthopedic and pain management consulted. Discussed with his PCP, and DR MENDEZ from radiation and Urologist also involved.      Assessment and Plan:    Problem/Plan - 1:  A/P: Compressing Fractures T5, L1  - Pain medication note appreciated  - F/U MRI results  - Physical Therapy Consult after MRI resulted  on Prednisone and diladid  Radiation oncology and urologist         Problem/Plan - 2:  ·  Problem: Prostate cancer.  Plan: will continue with his casodex and once acute pain issue is resolved pt. needs to see his oncologist.      Problem/Plan - 3:  ·  Problem: Renal insufficiency.  Plan: no old labs for comparison, marshall ? ATN ???likely due to taking NSAIDS as an outpatient  continue IV fluids  renal US    Elevated ALP:  abd benign  Will do US abdomen.,
58y old  found to have spinal metastasis of prostate cancer. Found A&Ox3, NAD, right lateral position in bed. He's awaiting transportation to a test and expresses concern about pain, if having to lie flat and turn from side to side. He is not oversedated. Discussed reintroducing a long acting opioid to his pain regimen. Patient encouraged to request PRN opioids prior to onset of severe pain.
> Prostrate ca with extensive mets to spine:  MRI spine results noted, no evidence of compression fracture and cord impingement.  TLSO brace per ortho.  RT this coming week.  > Cancer related pain - appears to be better controlled on current regimen.  continue Tizanidine, naloxegol, prednisone, fentanyl patch with prn diluaidid  > Debility - Pt input noted, ambultes independently.  > PATRICK, ATN  Cr appears to be stable.  Nephrology followup as outpatient.  > Elevated LFTs - possibly due to metastatic cancer.  conservative mgt, undergoing active treatment  > DVT prophylaxis - subcutaneous Heparin  Plan d/c in 24-48h if pain remains controlled.
> Prostrate ca with extensive mets to spine:  MRI spine results noted, no evidence of compression fracture and cord impingement.  TLSO brace per ortho.  RT this coming week.  Pt has established outpatient f/u at [facility].    > Cancer related pain - appears to be better controlled on current regimen.  continue Tizanidine, naloxegol, prednisone, fentanyl patch with prn diluaidid.  Confirmed, pt has Duragesic 50mcg/h at home.   > Debility - Pt input noted, ambulates independently.  > PATRICK, ATN  Cr appears to be stable.  Nephrology followup as outpatient.  > Elevated LFTs - possibly due to metastatic cancer.  conservative mgt, undergoing active treatment  > DVT prophylaxis - subcutaneous Heparin    Pt wishes for d/c home today.  He agrees not to drive, will have family friend pick him up.  He is aware of need for close medical followup due to malignancy, and not to delay treatment.

## 2018-12-10 ENCOUNTER — OUTPATIENT (OUTPATIENT)
Dept: OUTPATIENT SERVICES | Facility: HOSPITAL | Age: 58
LOS: 1 days | Discharge: HOME | End: 2018-12-10
Payer: COMMERCIAL

## 2018-12-10 ENCOUNTER — CHART COPY (OUTPATIENT)
Age: 58
End: 2018-12-10

## 2018-12-10 PROCEDURE — 77334 RADIATION TREATMENT AID(S): CPT | Mod: 26

## 2018-12-10 PROCEDURE — 77307 TELETHX ISODOSE PLAN CPLX: CPT | Mod: 26

## 2018-12-11 ENCOUNTER — APPOINTMENT (OUTPATIENT)
Dept: RADIATION ONCOLOGY | Facility: CLINIC | Age: 58
End: 2018-12-11

## 2018-12-12 ENCOUNTER — APPOINTMENT (OUTPATIENT)
Dept: HEMATOLOGY ONCOLOGY | Facility: CLINIC | Age: 58
End: 2018-12-12

## 2018-12-12 PROCEDURE — 77280 THER RAD SIMULAJ FIELD SMPL: CPT | Mod: 26

## 2018-12-17 VITALS
BODY MASS INDEX: 26.51 KG/M2 | WEIGHT: 200 LBS | TEMPERATURE: 98 F | SYSTOLIC BLOOD PRESSURE: 149 MMHG | RESPIRATION RATE: 18 BRPM | OXYGEN SATURATION: 98 % | HEART RATE: 90 BPM | HEIGHT: 73 IN | DIASTOLIC BLOOD PRESSURE: 86 MMHG

## 2018-12-17 NOTE — PHYSICAL EXAM
[Normal] : oriented to person, place and time, the affect was normal, the mood was normal and not anxious [de-identified] : wearing back brace

## 2018-12-17 NOTE — DISEASE MANAGEMENT
[Clinical] : TNM Stage: c [IV] : IV [FreeTextEntry4] : metatstaic adenocarcinoma of the prostate [TTNM] : 1c [NTNM] : 1 [MTNM] : 1b [de-identified] : 5371 [de-identified] : 2000 [de-identified] : T4-T9/T12-S3

## 2018-12-17 NOTE — REASON FOR VISIT
[Routine On-Treatment] : a routine on-treatment visit for [Bone Metastasis] : bone metastasis [Prostate Cancer] : prostate cancer

## 2018-12-17 NOTE — REVIEW OF SYSTEMS
[Constipation: Grade 1 - Occasional or intermittent symptoms; occasional use of stool softeners, laxatives, dietary modification, or enema] : Constipation: Grade 1 - Occasional or intermittent symptoms; occasional use of stool softeners, laxatives, dietary modification, or enema [Edema Limbs: Grade 0] : Edema Limbs: Grade 0  [Fatigue: Grade 1 - Fatigue relieved by rest] : Fatigue: Grade 1 - Fatigue relieved by rest [Localized Edema: Grade 0] : Localized Edema: Grade 0  [Neck Edema: Grade 0] : Neck Edema: Grade 0 [Skin Hyperpigmentation: Grade 0] : Skin Hyperpigmentation: Grade 0

## 2018-12-17 NOTE — VITALS
[Maximal Pain Intensity: 3/10] : 3/10 [Least Pain Intensity: 3/10] : 3/10 [Pain Description/Quality: ___] : Pain description/quality: [unfilled] [Pain Duration: ___] : Pain duration: [unfilled] [Pain Location: ___] : Pain Location: [unfilled] [Pain Interferes with ADLs] : Pain interferes with activities of daily living. [80: Normal activity with effort; some signs or symptoms of disease.] : 80: Normal activity with effort; some signs or symptoms of disease.

## 2018-12-18 PROCEDURE — 77427 RADIATION TX MANAGEMENT X5: CPT

## 2018-12-27 ENCOUNTER — EMERGENCY (EMERGENCY)
Facility: HOSPITAL | Age: 58
LOS: 1 days | Discharge: HOME | End: 2018-12-27
Attending: EMERGENCY MEDICINE
Payer: COMMERCIAL

## 2018-12-27 ENCOUNTER — RESULT REVIEW (OUTPATIENT)
Age: 58
End: 2018-12-27

## 2018-12-27 ENCOUNTER — APPOINTMENT (OUTPATIENT)
Dept: HEMATOLOGY ONCOLOGY | Facility: CLINIC | Age: 58
End: 2018-12-27
Payer: COMMERCIAL

## 2018-12-27 ENCOUNTER — OUTPATIENT (OUTPATIENT)
Dept: OUTPATIENT SERVICES | Facility: HOSPITAL | Age: 58
LOS: 1 days | Discharge: HOME | End: 2018-12-27

## 2018-12-27 VITALS
WEIGHT: 169.09 LBS | DIASTOLIC BLOOD PRESSURE: 88 MMHG | OXYGEN SATURATION: 97 % | SYSTOLIC BLOOD PRESSURE: 122 MMHG | HEIGHT: 73 IN | RESPIRATION RATE: 20 BRPM | TEMPERATURE: 99 F | HEART RATE: 90 BPM

## 2018-12-27 VITALS
SYSTOLIC BLOOD PRESSURE: 127 MMHG | OXYGEN SATURATION: 98 % | BODY MASS INDEX: 22.4 KG/M2 | HEART RATE: 89 BPM | DIASTOLIC BLOOD PRESSURE: 91 MMHG | HEIGHT: 73 IN | TEMPERATURE: 97.8 F | WEIGHT: 169 LBS

## 2018-12-27 DIAGNOSIS — C61 MALIGNANT NEOPLASM OF PROSTATE: ICD-10-CM

## 2018-12-27 LAB
BASOPHILS # BLD AUTO: 0.1 K/UL — SIGNIFICANT CHANGE UP (ref 0–0.2)
ELLIPTOCYTES BLD QL SMEAR: SLIGHT — SIGNIFICANT CHANGE UP
EOSINOPHIL # BLD AUTO: 0 K/UL — SIGNIFICANT CHANGE UP (ref 0–0.5)
HCT VFR BLD CALC: 57.2 % — CRITICAL HIGH (ref 39–50)
HGB BLD-MCNC: 18 G/DL — HIGH (ref 13–17)
LG PLATELETS BLD QL AUTO: SLIGHT — SIGNIFICANT CHANGE UP
LYMPHOCYTES # BLD AUTO: 0.1 K/UL — LOW (ref 1–3.3)
LYMPHOCYTES # BLD AUTO: 3 % — LOW (ref 13–44)
MCHC RBC-ENTMCNC: 25.3 PG — LOW (ref 27–34)
MCHC RBC-ENTMCNC: 31.5 GM/DL — LOW (ref 32–36)
MCV RBC AUTO: 80.4 FL — SIGNIFICANT CHANGE UP (ref 80–100)
MONOCYTES # BLD AUTO: 0.3 K/UL — SIGNIFICANT CHANGE UP (ref 0–0.9)
MONOCYTES NFR BLD AUTO: 4 % — SIGNIFICANT CHANGE UP (ref 2–14)
NEUTROPHILS # BLD AUTO: 5.2 K/UL — SIGNIFICANT CHANGE UP (ref 1.8–7.4)
NEUTROPHILS NFR BLD AUTO: 93 % — HIGH (ref 43–77)
OVALOCYTES BLD QL SMEAR: SLIGHT — SIGNIFICANT CHANGE UP
PLAT MORPH BLD: NORMAL — SIGNIFICANT CHANGE UP
PLATELET # BLD AUTO: 107 K/UL — LOW (ref 150–400)
POIKILOCYTOSIS BLD QL AUTO: SLIGHT — SIGNIFICANT CHANGE UP
RBC # BLD: 7.12 M/UL — HIGH (ref 4.2–5.8)
RBC # FLD: 14 % — SIGNIFICANT CHANGE UP (ref 10.3–14.5)
RBC BLD AUTO: NORMAL — SIGNIFICANT CHANGE UP
WBC # BLD: 5.7 K/UL — SIGNIFICANT CHANGE UP (ref 3.8–10.5)
WBC # FLD AUTO: 5.7 K/UL — SIGNIFICANT CHANGE UP (ref 3.8–10.5)

## 2018-12-27 PROCEDURE — 99214 OFFICE O/P EST MOD 30 MIN: CPT

## 2018-12-27 PROCEDURE — 99284 EMERGENCY DEPT VISIT MOD MDM: CPT | Mod: 25

## 2018-12-27 RX ORDER — HYDROMORPHONE HYDROCHLORIDE 2 MG/ML
1 INJECTION INTRAMUSCULAR; INTRAVENOUS; SUBCUTANEOUS ONCE
Qty: 0 | Refills: 0 | Status: COMPLETED | OUTPATIENT
Start: 2018-12-27 | End: 2018-12-28

## 2018-12-27 RX ORDER — SODIUM CHLORIDE 9 MG/ML
1000 INJECTION INTRAMUSCULAR; INTRAVENOUS; SUBCUTANEOUS ONCE
Qty: 0 | Refills: 0 | Status: COMPLETED | OUTPATIENT
Start: 2018-12-27 | End: 2018-12-27

## 2018-12-27 NOTE — ED ADULT TRIAGE NOTE - CHIEF COMPLAINT QUOTE
abd pain constipation saw pmd this am told to "drink" something to help constipation, developed pain afterwards

## 2018-12-27 NOTE — ED ADULT NURSE NOTE - INTERVENTIONS DEFINITIONS
Stretcher in lowest position, wheels locked, appropriate side rails in place/Reinforce activity limits and safety measures with patient and family/Monitor gait and stability/Monitor for mental status changes and reorient to person, place, and time/Physically safe environment: no spills, clutter or unnecessary equipment

## 2018-12-27 NOTE — ED ADULT NURSE NOTE - OBJECTIVE STATEMENT
radiation 12/18 Pt. c/o nausea and constipation for past 3-4 days, abdomen non-tender/ non-distended, no pain or vomiting. Hx of prostate CA mets to spine, c/o generalized weakness for past month. last radiation 12/18

## 2018-12-28 ENCOUNTER — CHART COPY (OUTPATIENT)
Age: 58
End: 2018-12-28

## 2018-12-28 VITALS
HEART RATE: 84 BPM | RESPIRATION RATE: 16 BRPM | SYSTOLIC BLOOD PRESSURE: 124 MMHG | OXYGEN SATURATION: 98 % | TEMPERATURE: 98 F | DIASTOLIC BLOOD PRESSURE: 74 MMHG

## 2018-12-28 LAB
ALBUMIN SERPL ELPH-MCNC: 3.6 G/DL — SIGNIFICANT CHANGE UP (ref 3.3–5.2)
ALBUMIN SERPL ELPH-MCNC: 4 G/DL
ALP BLD-CCNC: 159 U/L
ALP SERPL-CCNC: 151 U/L — HIGH (ref 40–120)
ALT FLD-CCNC: 24 U/L — SIGNIFICANT CHANGE UP
ALT SERPL-CCNC: 27 U/L
ANION GAP SERPL CALC-SCNC: 10 MMOL/L — SIGNIFICANT CHANGE UP (ref 5–17)
ANION GAP SERPL CALC-SCNC: 14 MMOL/L
ANION GAP SERPL CALC-SCNC: 6 MMOL/L — SIGNIFICANT CHANGE UP (ref 5–17)
ANION GAP SERPL CALC-SCNC: 8 MMOL/L — SIGNIFICANT CHANGE UP (ref 5–17)
ANION GAP SERPL CALC-SCNC: 9 MMOL/L — SIGNIFICANT CHANGE UP (ref 5–17)
APPEARANCE UR: CLEAR — SIGNIFICANT CHANGE UP
APTT BLD: 23.3 SEC — LOW (ref 27.5–36.3)
AST SERPL-CCNC: 14 U/L
AST SERPL-CCNC: 17 U/L — SIGNIFICANT CHANGE UP
BACTERIA # UR AUTO: ABNORMAL
BILIRUB SERPL-MCNC: 0.5 MG/DL
BILIRUB SERPL-MCNC: 0.6 MG/DL — SIGNIFICANT CHANGE UP (ref 0.4–2)
BILIRUB UR-MCNC: NEGATIVE — SIGNIFICANT CHANGE UP
BLD GP AB SCN SERPL QL: SIGNIFICANT CHANGE UP
BUN SERPL-MCNC: 46 MG/DL — HIGH (ref 8–20)
BUN SERPL-MCNC: 50 MG/DL — HIGH (ref 8–20)
BUN SERPL-MCNC: 52 MG/DL
BUN SERPL-MCNC: 53 MG/DL — HIGH (ref 8–20)
BUN SERPL-MCNC: 54 MG/DL — HIGH (ref 8–20)
CALCIUM SERPL-MCNC: 7.7 MG/DL — LOW (ref 8.6–10.2)
CALCIUM SERPL-MCNC: 8.2 MG/DL — LOW (ref 8.6–10.2)
CALCIUM SERPL-MCNC: 8.9 MG/DL — SIGNIFICANT CHANGE UP (ref 8.6–10.2)
CALCIUM SERPL-MCNC: 9 MG/DL — SIGNIFICANT CHANGE UP (ref 8.6–10.2)
CALCIUM SERPL-MCNC: 9.6 MG/DL
CHLORIDE SERPL-SCNC: 92 MMOL/L
CHLORIDE SERPL-SCNC: 92 MMOL/L — LOW (ref 98–107)
CHLORIDE SERPL-SCNC: 93 MMOL/L — LOW (ref 98–107)
CHLORIDE SERPL-SCNC: 94 MMOL/L — LOW (ref 98–107)
CHLORIDE SERPL-SCNC: 98 MMOL/L — SIGNIFICANT CHANGE UP (ref 98–107)
CK SERPL-CCNC: 54 U/L — SIGNIFICANT CHANGE UP (ref 30–200)
CO2 SERPL-SCNC: 23 MMOL/L — SIGNIFICANT CHANGE UP (ref 22–29)
CO2 SERPL-SCNC: 24 MMOL/L
CO2 SERPL-SCNC: 28 MMOL/L — SIGNIFICANT CHANGE UP (ref 22–29)
CO2 SERPL-SCNC: 29 MMOL/L — SIGNIFICANT CHANGE UP (ref 22–29)
CO2 SERPL-SCNC: 30 MMOL/L — HIGH (ref 22–29)
COLOR SPEC: YELLOW — SIGNIFICANT CHANGE UP
CREAT SERPL-MCNC: 1.29 MG/DL — SIGNIFICANT CHANGE UP (ref 0.5–1.3)
CREAT SERPL-MCNC: 1.34 MG/DL — HIGH (ref 0.5–1.3)
CREAT SERPL-MCNC: 1.51 MG/DL — HIGH (ref 0.5–1.3)
CREAT SERPL-MCNC: 1.52 MG/DL — HIGH (ref 0.5–1.3)
CREAT SERPL-MCNC: 1.53 MG/DL
DIFF PNL FLD: ABNORMAL
EPI CELLS # UR: SIGNIFICANT CHANGE UP
GLUCOSE SERPL-MCNC: 106 MG/DL — SIGNIFICANT CHANGE UP (ref 70–115)
GLUCOSE SERPL-MCNC: 108 MG/DL — SIGNIFICANT CHANGE UP (ref 70–115)
GLUCOSE SERPL-MCNC: 118 MG/DL — HIGH (ref 70–115)
GLUCOSE SERPL-MCNC: 120 MG/DL — HIGH (ref 70–115)
GLUCOSE SERPL-MCNC: 131 MG/DL
GLUCOSE UR QL: NEGATIVE MG/DL — SIGNIFICANT CHANGE UP
HCT VFR BLD CALC: 49 % — SIGNIFICANT CHANGE UP (ref 42–52)
HGB BLD-MCNC: 17.2 G/DL — SIGNIFICANT CHANGE UP (ref 14–18)
INR BLD: 0.89 RATIO — SIGNIFICANT CHANGE UP (ref 0.88–1.16)
KETONES UR-MCNC: NEGATIVE — SIGNIFICANT CHANGE UP
LACTATE BLDV-MCNC: 1.4 MMOL/L — SIGNIFICANT CHANGE UP (ref 0.5–2)
LEUKOCYTE ESTERASE UR-ACNC: NEGATIVE — SIGNIFICANT CHANGE UP
LIDOCAIN IGE QN: 21 U/L — LOW (ref 22–51)
MAGNESIUM SERPL-MCNC: 2.6 MG/DL — SIGNIFICANT CHANGE UP (ref 1.6–2.6)
MCHC RBC-ENTMCNC: 26.9 PG — LOW (ref 27–31)
MCHC RBC-ENTMCNC: 35.1 G/DL — SIGNIFICANT CHANGE UP (ref 32–36)
MCV RBC AUTO: 76.6 FL — LOW (ref 80–94)
NITRITE UR-MCNC: NEGATIVE — SIGNIFICANT CHANGE UP
PH UR: 5 — SIGNIFICANT CHANGE UP (ref 5–8)
PLATELET # BLD AUTO: 97 K/UL — LOW (ref 150–400)
POTASSIUM SERPL-MCNC: 4.6 MMOL/L — SIGNIFICANT CHANGE UP (ref 3.5–5.3)
POTASSIUM SERPL-MCNC: 5.9 MMOL/L — HIGH (ref 3.5–5.3)
POTASSIUM SERPL-MCNC: 6 MMOL/L — HIGH (ref 3.5–5.3)
POTASSIUM SERPL-MCNC: 6.6 MMOL/L — CRITICAL HIGH (ref 3.5–5.3)
POTASSIUM SERPL-SCNC: 4.6 MMOL/L — SIGNIFICANT CHANGE UP (ref 3.5–5.3)
POTASSIUM SERPL-SCNC: 5.9 MMOL/L — HIGH (ref 3.5–5.3)
POTASSIUM SERPL-SCNC: 6 MMOL/L — HIGH (ref 3.5–5.3)
POTASSIUM SERPL-SCNC: 6.3 MMOL/L
POTASSIUM SERPL-SCNC: 6.6 MMOL/L — CRITICAL HIGH (ref 3.5–5.3)
PROT SERPL-MCNC: 6.7 G/DL
PROT SERPL-MCNC: 6.9 G/DL — SIGNIFICANT CHANGE UP (ref 6.6–8.7)
PROT UR-MCNC: 15 MG/DL
PROTHROM AB SERPL-ACNC: 10.2 SEC — SIGNIFICANT CHANGE UP (ref 10–12.9)
PSA SERPL-MCNC: 12.72 NG/ML
RBC # BLD: 6.4 M/UL — HIGH (ref 4.6–6.2)
RBC # FLD: 15.7 % — HIGH (ref 11–15.6)
RBC CASTS # UR COMP ASSIST: SIGNIFICANT CHANGE UP /HPF (ref 0–4)
SODIUM SERPL-SCNC: 129 MMOL/L — LOW (ref 135–145)
SODIUM SERPL-SCNC: 129 MMOL/L — LOW (ref 135–145)
SODIUM SERPL-SCNC: 131 MMOL/L
SODIUM SERPL-SCNC: 131 MMOL/L — LOW (ref 135–145)
SODIUM SERPL-SCNC: 131 MMOL/L — LOW (ref 135–145)
SP GR SPEC: 1.01 — SIGNIFICANT CHANGE UP (ref 1.01–1.02)
TYPE + AB SCN PNL BLD: SIGNIFICANT CHANGE UP
UROBILINOGEN FLD QL: NEGATIVE MG/DL — SIGNIFICANT CHANGE UP
WBC # BLD: 4.5 K/UL — LOW (ref 4.8–10.8)
WBC # FLD AUTO: 4.5 K/UL — LOW (ref 4.8–10.8)
WBC UR QL: SIGNIFICANT CHANGE UP

## 2018-12-28 PROCEDURE — 82962 GLUCOSE BLOOD TEST: CPT

## 2018-12-28 PROCEDURE — 96361 HYDRATE IV INFUSION ADD-ON: CPT

## 2018-12-28 PROCEDURE — 74177 CT ABD & PELVIS W/CONTRAST: CPT

## 2018-12-28 PROCEDURE — 81001 URINALYSIS AUTO W/SCOPE: CPT

## 2018-12-28 PROCEDURE — 85610 PROTHROMBIN TIME: CPT

## 2018-12-28 PROCEDURE — 83605 ASSAY OF LACTIC ACID: CPT

## 2018-12-28 PROCEDURE — 83735 ASSAY OF MAGNESIUM: CPT

## 2018-12-28 PROCEDURE — 99284 EMERGENCY DEPT VISIT MOD MDM: CPT | Mod: 25

## 2018-12-28 PROCEDURE — 87086 URINE CULTURE/COLONY COUNT: CPT

## 2018-12-28 PROCEDURE — 85730 THROMBOPLASTIN TIME PARTIAL: CPT

## 2018-12-28 PROCEDURE — 86901 BLOOD TYPING SEROLOGIC RH(D): CPT

## 2018-12-28 PROCEDURE — 71046 X-RAY EXAM CHEST 2 VIEWS: CPT | Mod: 26

## 2018-12-28 PROCEDURE — 94640 AIRWAY INHALATION TREATMENT: CPT

## 2018-12-28 PROCEDURE — 74177 CT ABD & PELVIS W/CONTRAST: CPT | Mod: 26

## 2018-12-28 PROCEDURE — 82550 ASSAY OF CK (CPK): CPT

## 2018-12-28 PROCEDURE — 85027 COMPLETE CBC AUTOMATED: CPT

## 2018-12-28 PROCEDURE — 93005 ELECTROCARDIOGRAM TRACING: CPT

## 2018-12-28 PROCEDURE — 36415 COLL VENOUS BLD VENIPUNCTURE: CPT

## 2018-12-28 PROCEDURE — 71046 X-RAY EXAM CHEST 2 VIEWS: CPT

## 2018-12-28 PROCEDURE — 86900 BLOOD TYPING SEROLOGIC ABO: CPT

## 2018-12-28 PROCEDURE — 96375 TX/PRO/DX INJ NEW DRUG ADDON: CPT

## 2018-12-28 PROCEDURE — 80053 COMPREHEN METABOLIC PANEL: CPT

## 2018-12-28 PROCEDURE — 96374 THER/PROPH/DIAG INJ IV PUSH: CPT | Mod: XU

## 2018-12-28 PROCEDURE — 80048 BASIC METABOLIC PNL TOTAL CA: CPT

## 2018-12-28 PROCEDURE — 84100 ASSAY OF PHOSPHORUS: CPT

## 2018-12-28 PROCEDURE — 83690 ASSAY OF LIPASE: CPT

## 2018-12-28 PROCEDURE — 93010 ELECTROCARDIOGRAM REPORT: CPT

## 2018-12-28 PROCEDURE — 86850 RBC ANTIBODY SCREEN: CPT

## 2018-12-28 RX ORDER — ONDANSETRON 8 MG/1
8 TABLET, FILM COATED ORAL ONCE
Qty: 0 | Refills: 0 | Status: COMPLETED | OUTPATIENT
Start: 2018-12-28 | End: 2018-12-28

## 2018-12-28 RX ORDER — DEXTROSE 50 % IN WATER 50 %
50 SYRINGE (ML) INTRAVENOUS ONCE
Qty: 0 | Refills: 0 | Status: COMPLETED | OUTPATIENT
Start: 2018-12-28 | End: 2018-12-28

## 2018-12-28 RX ORDER — ALBUTEROL 90 UG/1
2.5 AEROSOL, METERED ORAL ONCE
Qty: 0 | Refills: 0 | Status: COMPLETED | OUTPATIENT
Start: 2018-12-28 | End: 2018-12-28

## 2018-12-28 RX ORDER — SODIUM CHLORIDE 9 MG/ML
1000 INJECTION INTRAMUSCULAR; INTRAVENOUS; SUBCUTANEOUS ONCE
Qty: 0 | Refills: 0 | Status: COMPLETED | OUTPATIENT
Start: 2018-12-28 | End: 2018-12-28

## 2018-12-28 RX ORDER — INSULIN HUMAN 100 [IU]/ML
10 INJECTION, SOLUTION SUBCUTANEOUS ONCE
Qty: 0 | Refills: 0 | Status: COMPLETED | OUTPATIENT
Start: 2018-12-28 | End: 2018-12-28

## 2018-12-28 RX ADMIN — SODIUM CHLORIDE 1000 MILLILITER(S): 9 INJECTION INTRAMUSCULAR; INTRAVENOUS; SUBCUTANEOUS at 04:02

## 2018-12-28 RX ADMIN — SODIUM CHLORIDE 1000 MILLILITER(S): 9 INJECTION INTRAMUSCULAR; INTRAVENOUS; SUBCUTANEOUS at 02:06

## 2018-12-28 RX ADMIN — Medication 50 MILLILITER(S): at 04:29

## 2018-12-28 RX ADMIN — ALBUTEROL 2.5 MILLIGRAM(S): 90 AEROSOL, METERED ORAL at 04:29

## 2018-12-28 RX ADMIN — ONDANSETRON 8 MILLIGRAM(S): 8 TABLET, FILM COATED ORAL at 01:06

## 2018-12-28 RX ADMIN — INSULIN HUMAN 10 UNIT(S): 100 INJECTION, SOLUTION SUBCUTANEOUS at 04:29

## 2018-12-28 RX ADMIN — SODIUM CHLORIDE 1000 MILLILITER(S): 9 INJECTION INTRAMUSCULAR; INTRAVENOUS; SUBCUTANEOUS at 01:06

## 2018-12-28 NOTE — ED PROVIDER NOTE - ATTENDING CONTRIBUTION TO CARE
I personally saw the patient with the PA, and completed the key components of the history and physical exam. I then discussed the management plan with the PA.   gen in nad resp clear cardiac no murmur abd soft nt nd no cvat neuro intact

## 2018-12-28 NOTE — ASSESSMENT
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 If able to swallow Zytiga tablets will use that approach, given severe weakness at presentation.\par \par Labs confirm pre-renal azotemia consistent with dehydration - encouraged to push p.o. fluids at home, may require hydration at [facility].\par Will DC ketoconazole. [Palliative] : Goals of care discussed with patient: Palliative

## 2018-12-28 NOTE — ED PROVIDER NOTE - PROGRESS NOTE DETAILS
Spoke to patient and wife at bedside. Patient states he currently does not have any pain. Discussed CT findings with patient and wife at length. Discussed chronic neoplasm and mets findings on report. Will obtain UA. Patient agreeable to discharge home if no changes in assessment/plan. K 6.0. no EKG changes. Patient received albuterol nebs x 2, insulin 10 u and 1 amp dextrose. repeat K 4.6. Patient will be discharged to home. Plan discussed with patient. Patient will follow-up with his oncologist Dr. Canseco

## 2018-12-28 NOTE — REVIEW OF SYSTEMS
[Fever] : no fever [Fatigue] : fatigue [Dysphagia] : dysphagia [Depression] : depression [Negative] : Heme/Lymph [FreeTextEntry8] : Difficulty with urinary stream, but not incontinent [FreeTextEntry9] : Bone pain now much better controlled following palliative and initiation of ADT

## 2018-12-28 NOTE — ED PROVIDER NOTE - OBJECTIVE STATEMENT
58 y Male pmhx prostate cancer with bone mets to spine presents to ED for abdominal pain and constipation x 3 days. Patient's wife at bedside. Patient's wife states he completed his last radiation treatment on 12/18. She states for the past 3 days he has been constipated and has not had a bowel movement. Patient also notes abdominal pain, mostly in epigastric region. His wife states he also appears weaker than usual, and has had no appetite. Patient states his reduced intake is due to lack of appetite rather than abdominal pain. Patient also notes nausea but no vomiting. Patient denies fever, chills, vomiting, diarrhea, headache, dizziness, back pain, bowel/bladder incontinence, dysuria. 58 y Male pmhx prostate cancer with bone mets to spine presents to ED for abdominal pain and constipation x 3 days. Patient's wife at bedside. Patient's wife states he completed his last radiation treatment on 12/18. She states for the past 3 days he has been constipated and has not had a bowel movement. Patient also notes abdominal pain, mostly in epigastric region. His wife states he also appears weaker than usual, and has had no appetite. Patient states his reduced intake is due to lack of appetite rather than abdominal pain. Patient also notes nausea but no vomiting. Patient on home rx for opioid pain meds, as well as for dulcolax, senna etc but does not take the stool softening meds as per wife. Patient denies fever, chills, vomiting, diarrhea, headache, dizziness, back pain, bowel/bladder incontinence, dysuria.

## 2018-12-28 NOTE — ED PROVIDER NOTE - MEDICAL DECISION MAKING DETAILS
58 y male pmhx prostate ca presenting for abdominal pain, constipation x 3 days. Will do abdominal workup, labs, IVF, pain meds, anti-emetics, ekg, CXR, CT abd/pelvis. Will re-assess

## 2018-12-28 NOTE — ADDENDUM
[FreeTextEntry1] : I, Fernando Almonte, acted solely as a scribe for Dr. Jayme Canseco on this date 12/27/18.

## 2018-12-28 NOTE — HISTORY OF PRESENT ILLNESS
[de-identified] : Followup of hospital consult for severe advanced prostate cancer with painful bone mets. He was diagnosed with prostrate cancer by urologist Dr. Dylon Franklin.  He was admitted to the emergency room on 12/6/18 for increasing lower back pain and difficulty ambulating.  He reports that his pain has significantly improved following the prescription of fentanyl (patch) 50 mcg.  He has currently finished radiation therapy.  He complains of constipation, and poor appetite. He notes that food doesn't currently taste well. He admits to decreased urinary output.  No other new complaints today. Difficulty swallowing since palliative RT to T-spine port, with esophageal inflammation.\par Began Lupron and casodex on recent [facility] admission, and was started on ketoconazole by Urology.\par Here today for future systemic treatment planning.\par \par TNM Stage: [id number]\par AJCC Stage (8th Ed): IV\par \par Nuclear Medicine 11/12/18: Abnormal bone scan. Findings compatible with multifocal osseous metastases. Tracer stasis in the left kidney. Obstruction cannot be excluded.

## 2018-12-28 NOTE — ED PROVIDER NOTE - NEUROLOGICAL, MLM
Alert and oriented, no focal deficits, no motor or sensory deficits. Able to ambulate without neuro deficits

## 2018-12-28 NOTE — ED PROVIDER NOTE - CONSTITUTIONAL, MLM
normal... Frail appearing, awake, alert, oriented to person, place, time/situation, appears uncomfortable

## 2018-12-28 NOTE — ED PROVIDER NOTE - CARE PLAN
Principal Discharge DX:	Constipation  Secondary Diagnosis:	Abdominal pain Principal Discharge DX:	Abdominal pain  Secondary Diagnosis:	Prostate cancer  Secondary Diagnosis:	Hyperkalemia

## 2018-12-28 NOTE — PHYSICAL EXAM
[Ambulatory and capable of all self care but unable to carry out any work activities] : Status 2- Ambulatory and capable of all self care but unable to carry out any work activities. Up and about more than 50% of waking hours [Normal Male] : prostate smooth, symmetric with no modularity or induration [Normal] : grossly intact [de-identified] : Depressed, moving slowly [de-identified] : Dry mouth

## 2018-12-28 NOTE — REASON FOR VISIT
[Follow-Up Visit] : a follow-up visit for [Spouse] : spouse [FreeTextEntry2] : metastatic adenocarcinoma of the prostate

## 2018-12-29 LAB
CULTURE RESULTS: SIGNIFICANT CHANGE UP
SPECIMEN SOURCE: SIGNIFICANT CHANGE UP

## 2019-01-02 ENCOUNTER — MEDICATION RENEWAL (OUTPATIENT)
Age: 59
End: 2019-01-02

## 2019-01-04 ENCOUNTER — OTHER (OUTPATIENT)
Age: 59
End: 2019-01-04

## 2019-01-08 ENCOUNTER — RESULT REVIEW (OUTPATIENT)
Age: 59
End: 2019-01-08

## 2019-01-08 ENCOUNTER — APPOINTMENT (OUTPATIENT)
Age: 59
End: 2019-01-08

## 2019-01-08 ENCOUNTER — OTHER (OUTPATIENT)
Age: 59
End: 2019-01-08

## 2019-01-08 LAB
BASOPHILS # BLD AUTO: 0 K/UL — SIGNIFICANT CHANGE UP (ref 0–0.2)
BASOPHILS NFR BLD AUTO: 1.3 % — SIGNIFICANT CHANGE UP (ref 0–2)
BUN SERPL-MCNC: 13 MG/DL — SIGNIFICANT CHANGE UP (ref 7–23)
CA-I BLDA-SCNC: 1.23 MMOL/L — SIGNIFICANT CHANGE UP (ref 1.12–1.3)
CHLORIDE SERPL-SCNC: 98 MMOL/L — SIGNIFICANT CHANGE UP (ref 96–108)
CO2 SERPL-SCNC: 27 MMOL/L — SIGNIFICANT CHANGE UP (ref 22–31)
CREAT SERPL-MCNC: 1 MG/DL — SIGNIFICANT CHANGE UP (ref 0.5–1.3)
EOSINOPHIL # BLD AUTO: 0 K/UL — SIGNIFICANT CHANGE UP (ref 0–0.5)
EOSINOPHIL NFR BLD AUTO: 0.4 % — SIGNIFICANT CHANGE UP (ref 0–6)
GLUCOSE SERPL-MCNC: 159 MG/DL — HIGH (ref 70–99)
HCT VFR BLD CALC: 42.2 % — SIGNIFICANT CHANGE UP (ref 39–50)
HGB BLD-MCNC: 13.8 G/DL — SIGNIFICANT CHANGE UP (ref 13–17)
LYMPHOCYTES # BLD AUTO: 0.3 K/UL — LOW (ref 1–3.3)
LYMPHOCYTES # BLD AUTO: 9.3 % — LOW (ref 13–44)
MCHC RBC-ENTMCNC: 26.1 PG — LOW (ref 27–34)
MCHC RBC-ENTMCNC: 32.6 GM/DL — SIGNIFICANT CHANGE UP (ref 32–36)
MCV RBC AUTO: 80.2 FL — SIGNIFICANT CHANGE UP (ref 80–100)
MONOCYTES # BLD AUTO: 0.3 K/UL — SIGNIFICANT CHANGE UP (ref 0–0.9)
MONOCYTES NFR BLD AUTO: 8.5 % — SIGNIFICANT CHANGE UP (ref 2–14)
NEUTROPHILS # BLD AUTO: 3 K/UL — SIGNIFICANT CHANGE UP (ref 1.8–7.4)
NEUTROPHILS NFR BLD AUTO: 80.6 % — HIGH (ref 43–77)
PLATELET # BLD AUTO: 214 K/UL — SIGNIFICANT CHANGE UP (ref 150–400)
POTASSIUM SERPL-MCNC: 4.7 MMOL/L — SIGNIFICANT CHANGE UP (ref 3.5–5.3)
POTASSIUM SERPL-SCNC: 4.7 MMOL/L — SIGNIFICANT CHANGE UP (ref 3.5–5.3)
RBC # BLD: 5.27 M/UL — SIGNIFICANT CHANGE UP (ref 4.2–5.8)
RBC # FLD: 15 % — HIGH (ref 10.3–14.5)
SODIUM SERPL-SCNC: 136 MMOL/L — SIGNIFICANT CHANGE UP (ref 135–145)
WBC # BLD: 3.7 K/UL — LOW (ref 3.8–10.5)
WBC # FLD AUTO: 3.7 K/UL — LOW (ref 3.8–10.5)

## 2019-01-10 DIAGNOSIS — C79.51 SECONDARY MALIGNANT NEOPLASM OF BONE: ICD-10-CM

## 2019-01-17 ENCOUNTER — APPOINTMENT (OUTPATIENT)
Dept: RADIATION ONCOLOGY | Facility: CLINIC | Age: 59
End: 2019-01-17
Payer: COMMERCIAL

## 2019-01-17 PROCEDURE — 99024 POSTOP FOLLOW-UP VISIT: CPT

## 2019-01-17 NOTE — HISTORY OF PRESENT ILLNESS
[FreeTextEntry1] : This 59 year-old male is being seen for post-treatment evaluation.  He completed radiation therapy on for a metastatic adenocarcinoma of the prostate to T4-9 & T12-S3, completed 12/18/2018.\par \par Initially Mr. Cedeno was seen at [facility] for advanced prostate cancer with painful bone metastasis.  He was admitted to the emergency room on 12/6/18 for increasing lower back pain and difficulty ambulating. He reported significantly improved following the prescription of fentanyl (patch) 50 mcg. \par \par He reports his back pain has decreased since completing radiation therapy.  He follows with Dr. Canseco and is maintained on monthly Lupron injections, Xgeva, and Zytiga.  Continues wearing brace initially given in hospital.  Advised to follow up with Dr. Alonso.\par

## 2019-01-17 NOTE — ASSESSMENT
[Metastatic disease with local control] : Metastatic disease with local control [FreeTextEntry1] : no appreciable treatment related sequelae. Has had good palliation of presenting pain.

## 2019-01-17 NOTE — LETTER GREETING
[Dear Doctor] : Dear Doctor, [Follow-Up] : Your patient, [unfilled] was seen in my office today for follow-up [Please see my note below.] : Please see my note below. [FreeTextEntry2] : Jayme Canseco MD

## 2019-01-17 NOTE — PHYSICAL EXAM
[No Focal Deficits] : no focal deficits [Normal] : oriented to person, place and time, the affect was normal, the mood was normal and not anxious [FreeTextEntry1] : deferred [de-identified] : deferred [de-identified] : wears supportive torso brace

## 2019-01-17 NOTE — REASON FOR VISIT
[Post-Treatment Evaluation] : post-treatment evaluation for [Bone Metastasis] : bone metastasis [Prostate Cancer] : prostate cancer [Spouse] : spouse

## 2019-01-17 NOTE — VITALS
[Maximal Pain Intensity: 3/10] : 3/10 [Least Pain Intensity: 1/10] : 1/10 [Pain Description/Quality: ___] : Pain description/quality: [unfilled] [Pain Duration: ___] : Pain duration: [unfilled] [Pain Location: ___] : Pain Location: [unfilled] [Pain Interferes with ADLs] : Pain interferes with activities of daily living. [Opioid] : opioid [70: Cares for self; unalbe to carry on normal activity or do active work.] : 70: Cares for self; unable to carry on normal activity or do active work. [ECOG Performance Status: 2 - Ambulatory and capable of all self care but unable to carry out any work activities] : Performance Status: 2 - Ambulatory and capable of all self care but unable to carry out any work activities. Up and about more than 50% of waking hours

## 2019-01-17 NOTE — LETTER CLOSING
[Sincerely yours,] : Sincerely yours, [FreeTextEntry3] : Naresh King MD\par Physician in Chief\par Department of Radiation Medicine\par [facility] [place]\par [facility] [place]\par \par  of Radiation Medicine\par [facility]\par at  [facility]\par \par Radiation \par [facility]/\par [facility] [place]\par [address]\par [place], [place] [address]\par \par Tel: [phone]\par Fax: [phone]\par

## 2019-01-17 NOTE — DISEASE MANAGEMENT
[Clinical] : TNM Stage: c [IV] : IV [TTNM] : 1c [NTNM] : 1 [MTNM] : 1b [de-identified] : 2000cGy [de-identified] : T4-T9;  T12-S3/SI joints

## 2019-01-17 NOTE — REVIEW OF SYSTEMS
Reviewed MRI brain with pt.  Study shows minimal-mild age-related change.  She will be setting up neuropsych testing.    [Negative] : Allergic/Immunologic [FreeTextEntry9] : baseline moderate back pain with activity ; continues to wear back brace when not sleeping. [de-identified] : intermittent tingling over the top of right foot.

## 2019-01-28 ENCOUNTER — RX RENEWAL (OUTPATIENT)
Age: 59
End: 2019-01-28

## 2019-01-28 ENCOUNTER — OUTPATIENT (OUTPATIENT)
Dept: OUTPATIENT SERVICES | Facility: HOSPITAL | Age: 59
LOS: 1 days | Discharge: HOME | End: 2019-01-28

## 2019-01-28 DIAGNOSIS — C79.51 SECONDARY MALIGNANT NEOPLASM OF BONE: ICD-10-CM

## 2019-01-28 DIAGNOSIS — C61 MALIGNANT NEOPLASM OF PROSTATE: ICD-10-CM

## 2019-01-30 ENCOUNTER — MEDICATION RENEWAL (OUTPATIENT)
Age: 59
End: 2019-01-30

## 2019-01-30 ENCOUNTER — RX RENEWAL (OUTPATIENT)
Age: 59
End: 2019-01-30

## 2019-02-05 ENCOUNTER — TRANSCRIPTION ENCOUNTER (OUTPATIENT)
Age: 59
End: 2019-02-05

## 2019-02-05 ENCOUNTER — RESULT REVIEW (OUTPATIENT)
Age: 59
End: 2019-02-05

## 2019-02-05 ENCOUNTER — APPOINTMENT (OUTPATIENT)
Age: 59
End: 2019-02-05

## 2019-02-05 LAB
BASOPHILS # BLD AUTO: 0.2 K/UL — SIGNIFICANT CHANGE UP (ref 0–0.2)
BASOPHILS NFR BLD AUTO: 2.1 % — HIGH (ref 0–2)
EOSINOPHIL # BLD AUTO: 0.1 K/UL — SIGNIFICANT CHANGE UP (ref 0–0.5)
EOSINOPHIL NFR BLD AUTO: 0.7 % — SIGNIFICANT CHANGE UP (ref 0–6)
HCT VFR BLD CALC: 40.3 % — SIGNIFICANT CHANGE UP (ref 39–50)
HGB BLD-MCNC: 12.5 G/DL — LOW (ref 13–17)
LYMPHOCYTES # BLD AUTO: 0.8 K/UL — LOW (ref 1–3.3)
LYMPHOCYTES # BLD AUTO: 8.2 % — LOW (ref 13–44)
MCHC RBC-ENTMCNC: 25.8 PG — LOW (ref 27–34)
MCHC RBC-ENTMCNC: 31 G/DL — LOW (ref 32–36)
MCV RBC AUTO: 83.4 FL — SIGNIFICANT CHANGE UP (ref 80–100)
MONOCYTES # BLD AUTO: 0.8 K/UL — SIGNIFICANT CHANGE UP (ref 0–0.9)
MONOCYTES NFR BLD AUTO: 8 % — SIGNIFICANT CHANGE UP (ref 2–14)
NEUTROPHILS # BLD AUTO: 7.7 K/UL — HIGH (ref 1.8–7.4)
NEUTROPHILS NFR BLD AUTO: 81 % — HIGH (ref 43–77)
PLATELET # BLD AUTO: 263 K/UL — SIGNIFICANT CHANGE UP (ref 150–400)
RBC # BLD: 4.83 M/UL — SIGNIFICANT CHANGE UP (ref 4.2–5.8)
RBC # FLD: 19.1 % — HIGH (ref 10.3–14.5)
WBC # BLD: 9.5 K/UL — SIGNIFICANT CHANGE UP (ref 3.8–10.5)
WBC # FLD AUTO: 9.5 K/UL — SIGNIFICANT CHANGE UP (ref 3.8–10.5)

## 2019-02-07 ENCOUNTER — MEDICATION RENEWAL (OUTPATIENT)
Age: 59
End: 2019-02-07

## 2019-02-12 NOTE — HISTORY OF PRESENT ILLNESS
[FreeTextEntry1] : Called [facility] for report on pt.  Spoke to pt's nurse, Alla BRADFORD, who states that pt is comfortable at present.  Informed nurse that pt will be coming to Dept for simulation today, and to have pt medicated before being transported.  Pt was transport from hospital via ambulance to Dept.  Pt alert and oriented.  Pt not having too much discomfort, states pain level is 6/10.  V/S /78, P 62. R 18,  O2 sat 98 RA.  Radiation procedure was explained to pt with full understanding.  Pt tolerated simulation well and was not complaining of any discomfort.  Pt was then returned back to [facility] via ambulance, V/S /82, P 64, R 16 O2 sat 96 % RA.   Report was given back to pt's nurse Alla.

## 2019-02-14 ENCOUNTER — APPOINTMENT (OUTPATIENT)
Dept: ORTHOPEDIC SURGERY | Facility: CLINIC | Age: 59
End: 2019-02-14
Payer: COMMERCIAL

## 2019-02-15 ENCOUNTER — APPOINTMENT (OUTPATIENT)
Dept: ORTHOPEDIC SURGERY | Facility: CLINIC | Age: 59
End: 2019-02-15
Payer: COMMERCIAL

## 2019-02-15 VITALS
SYSTOLIC BLOOD PRESSURE: 151 MMHG | HEART RATE: 87 BPM | BODY MASS INDEX: 25.18 KG/M2 | HEIGHT: 73 IN | WEIGHT: 190 LBS | DIASTOLIC BLOOD PRESSURE: 91 MMHG

## 2019-02-15 DIAGNOSIS — Z82.49 FAMILY HISTORY OF ISCHEMIC HEART DISEASE AND OTHER DISEASES OF THE CIRCULATORY SYSTEM: ICD-10-CM

## 2019-02-15 DIAGNOSIS — N40.0 BENIGN PROSTATIC HYPERPLASIA WITHOUT LOWER URINARY TRACT SYMPMS: ICD-10-CM

## 2019-02-15 PROCEDURE — 99214 OFFICE O/P EST MOD 30 MIN: CPT

## 2019-02-15 PROCEDURE — 72100 X-RAY EXAM L-S SPINE 2/3 VWS: CPT

## 2019-02-15 NOTE — PHYSICAL EXAM
[Poor Appearance] : well-appearing [Acute Distress] : not in acute distress [de-identified] : CONSTITUTIONAL: The patient is a very pleasant individual who is well-nourished and who appears stated age. pt is thin, but not cachectic. \par PSYCHIATRIC: The patient is alert and oriented X 3 and in no apparent distress, and participates with orthopedic evaluation well.\par HEAD: Atraumatic and is nonsyndromic in appearance.\par EENT: No visible thyromegaly, EOMI.\par RESPIRATORY: Respiratory rate is regular, not dyspneic on examination.\par LYMPHATICS: There is no inguinal lymphadenopathy\par INTEGUMENTARY: Skin is clean, dry, and intact about the bilateral lower extremities and lumbar spine.\par VASCULAR: There is brisk capillary refill about the bilateral lower extremities.\par NEUROLOGIC: There are no pathologic reflexes. There is no decrease in sensation of the bilateral lower extremities on Wartenberg pinwheel examination. Deep tendon reflexes are well maintained at 2+/4 of the bilateral lower extremities and are symmetric.\par MUSCULOSKELETAL: There is no visible muscular atrophy. Manual motor strength is well maintained in the bilateral lower extremities. Range of motion of lumbar spine is well maintained. The patient ambulates in a non-myelopathic manner. Negative tension sign and straight leg raise bilaterally. Quad extension, ankle dorsiflexion, EHL, plantar flexion, and ankle eversion are well preserved. Normal secondary orthopaedic exam of bilateral hips, greater trochanteric area, knees and ankles \par \par reproducible left sided thoracolumbar myositis.  [de-identified] : X-ray of the lumbar spine taken today shows moderate to severe lumbar spondylosis.\par \par MRI of cervical, thoracic, and lumbar spine at [facility] from 12/6/18 shows multiple osseous lesions are noted including T12 which is extensive and L1 with no encroachment into the neural elements. THere is also metastasis at T9 with normal dimensions of thoracic spinal canal and no spinal cord signal changes. Cervical spinal cord is also well preserved.

## 2019-02-15 NOTE — DISCUSSION/SUMMARY
[de-identified] : I have recommended that the pt continue with a conservative treatment plan. Pt has been referred to physical therapy for decreased pain modalities, core strengthening modalities, soft tissue modalities, and physical modalities. It is reasonable that this patient be out of work, but from an orthopedic spinal surgeon standpoint, there is no acute spinal surgical indication at this point in time. Pt can follow up here every 3-6 months / prn. \par \par I think that pt can return to light duty, 5-10 lbs based on the fact that he can only lift about 10 lbs at home without beginning to have low back pain. T12 vertebrae is quite effected by metastatic lesion, impending fx may occur if pt lifts heavy objects which may result in need for urgent spine surgery. Repeat MRI / CT in 2 months, and his signs and symptoms are improved and radiographs agree, we can discuss further employment adjustments.

## 2019-02-15 NOTE — ADDENDUM
[FreeTextEntry1] : Documented by Fernando Martines acting as a scribe for Dr. Anthony Flores on 02/15/2019 . All medical record entries made by the Scribe were at my, Dr. Anthony Flores, direction and personally dictated by me on 02/15/2019 . I have reviewed the chart and agree that the record accurately reflects my personal performance of the history, physical exam, assessment and plan. I have also personally directed, reviewed, and agreed with the chart.

## 2019-02-15 NOTE — HISTORY OF PRESENT ILLNESS
[de-identified] : 59 year old M presents with lower thoracic spine pain which has been going on for several months. He states his pain is currently controlled. Constantly rates 2/10. Acute episode in Dec 2018 and he went to [facility]. He had metastatic workup, bone scan revealed multiple osseous metastasis of thoracolumbar spine, b/l ribs, scapulae and pelvis. MRI's were also done. No radicular pain or weakness. He had home PT but no subsequent PT. He is actively seeing Dr. Canseco with Onc and Dr. King RAD/ONC. No changes in bowel or bladder habits. Pain on occurs when he reaches to the right, myositis in nature. He is currently on Fentanyl, other pain medications, as well as hormonal and regular chemotherapy. [Ataxia] : no ataxia [Incontinence] : no incontinence [Loss of Dexterity] : good dexterity [Urinary Ret.] : no urinary retention

## 2019-02-22 ENCOUNTER — OUTPATIENT (OUTPATIENT)
Dept: OUTPATIENT SERVICES | Facility: HOSPITAL | Age: 59
LOS: 1 days | Discharge: HOME | End: 2019-02-22

## 2019-02-22 DIAGNOSIS — C61 MALIGNANT NEOPLASM OF PROSTATE: ICD-10-CM

## 2019-02-22 DIAGNOSIS — C79.51 SECONDARY MALIGNANT NEOPLASM OF BONE: ICD-10-CM

## 2019-02-27 ENCOUNTER — RECORD ABSTRACTING (OUTPATIENT)
Age: 59
End: 2019-02-27

## 2019-02-28 ENCOUNTER — MEDICATION RENEWAL (OUTPATIENT)
Age: 59
End: 2019-02-28

## 2019-03-01 ENCOUNTER — RX RENEWAL (OUTPATIENT)
Age: 59
End: 2019-03-01

## 2019-03-02 ENCOUNTER — TRANSCRIPTION ENCOUNTER (OUTPATIENT)
Age: 59
End: 2019-03-02

## 2019-03-05 ENCOUNTER — APPOINTMENT (OUTPATIENT)
Age: 59
End: 2019-03-05

## 2019-03-05 ENCOUNTER — RESULT REVIEW (OUTPATIENT)
Age: 59
End: 2019-03-05

## 2019-03-05 ENCOUNTER — APPOINTMENT (OUTPATIENT)
Dept: HEMATOLOGY ONCOLOGY | Facility: CLINIC | Age: 59
End: 2019-03-05
Payer: COMMERCIAL

## 2019-03-05 VITALS
DIASTOLIC BLOOD PRESSURE: 83 MMHG | OXYGEN SATURATION: 98 % | HEART RATE: 80 BPM | BODY MASS INDEX: 24.78 KG/M2 | TEMPERATURE: 98.7 F | HEIGHT: 73 IN | WEIGHT: 187 LBS | SYSTOLIC BLOOD PRESSURE: 127 MMHG

## 2019-03-05 LAB
BASOPHILS # BLD AUTO: 0 K/UL — SIGNIFICANT CHANGE UP (ref 0–0.2)
BASOPHILS NFR BLD AUTO: 0.7 % — SIGNIFICANT CHANGE UP (ref 0–2)
BUN SERPL-MCNC: 10 MG/DL — SIGNIFICANT CHANGE UP (ref 7–23)
CA-I BLDA-SCNC: 1.18 MMOL/L — SIGNIFICANT CHANGE UP (ref 1.12–1.3)
CHLORIDE SERPL-SCNC: 102 MMOL/L — SIGNIFICANT CHANGE UP (ref 96–108)
CO2 SERPL-SCNC: 31 MMOL/L — SIGNIFICANT CHANGE UP (ref 22–31)
CREAT SERPL-MCNC: 1 MG/DL — SIGNIFICANT CHANGE UP (ref 0.5–1.3)
EOSINOPHIL # BLD AUTO: 0.1 K/UL — SIGNIFICANT CHANGE UP (ref 0–0.5)
EOSINOPHIL NFR BLD AUTO: 1.5 % — SIGNIFICANT CHANGE UP (ref 0–6)
GLUCOSE SERPL-MCNC: 87 MG/DL — SIGNIFICANT CHANGE UP (ref 70–99)
HCT VFR BLD CALC: 40.4 % — SIGNIFICANT CHANGE UP (ref 39–50)
HGB BLD-MCNC: 12.8 G/DL — LOW (ref 13–17)
LYMPHOCYTES # BLD AUTO: 0.6 K/UL — LOW (ref 1–3.3)
LYMPHOCYTES # BLD AUTO: 11.8 % — LOW (ref 13–44)
MCHC RBC-ENTMCNC: 26.8 PG — LOW (ref 27–34)
MCHC RBC-ENTMCNC: 31.8 G/DL — LOW (ref 32–36)
MCV RBC AUTO: 84.2 FL — SIGNIFICANT CHANGE UP (ref 80–100)
MONOCYTES # BLD AUTO: 0.6 K/UL — SIGNIFICANT CHANGE UP (ref 0–0.9)
MONOCYTES NFR BLD AUTO: 12 % — SIGNIFICANT CHANGE UP (ref 2–14)
NEUTROPHILS # BLD AUTO: 3.8 K/UL — SIGNIFICANT CHANGE UP (ref 1.8–7.4)
NEUTROPHILS NFR BLD AUTO: 74 % — SIGNIFICANT CHANGE UP (ref 43–77)
PLATELET # BLD AUTO: 256 K/UL — SIGNIFICANT CHANGE UP (ref 150–400)
POTASSIUM SERPL-MCNC: 4.4 MMOL/L — SIGNIFICANT CHANGE UP (ref 3.5–5.3)
POTASSIUM SERPL-SCNC: 4.4 MMOL/L — SIGNIFICANT CHANGE UP (ref 3.5–5.3)
RBC # BLD: 4.8 M/UL — SIGNIFICANT CHANGE UP (ref 4.2–5.8)
RBC # FLD: 16.8 % — HIGH (ref 10.3–14.5)
SODIUM SERPL-SCNC: 143 MMOL/L — SIGNIFICANT CHANGE UP (ref 135–145)
WBC # BLD: 5.2 K/UL — SIGNIFICANT CHANGE UP (ref 3.8–10.5)
WBC # FLD AUTO: 5.2 K/UL — SIGNIFICANT CHANGE UP (ref 3.8–10.5)

## 2019-03-05 PROCEDURE — 99214 OFFICE O/P EST MOD 30 MIN: CPT

## 2019-03-05 NOTE — REVIEW OF SYSTEMS
[Recent Change In Weight] : ~T recent weight change [Vomiting] : vomiting [Diarrhea] : diarrhea [Negative] : Allergic/Immunologic

## 2019-03-06 LAB
ALBUMIN SERPL ELPH-MCNC: 4.2 G/DL
ALP BLD-CCNC: 147 U/L
ALT SERPL-CCNC: 27 U/L
ANION GAP SERPL CALC-SCNC: 11 MMOL/L
AST SERPL-CCNC: 17 U/L
BILIRUB SERPL-MCNC: 0.2 MG/DL
BUN SERPL-MCNC: 11 MG/DL
CALCIUM SERPL-MCNC: 9.3 MG/DL
CHLORIDE SERPL-SCNC: 103 MMOL/L
CO2 SERPL-SCNC: 30 MMOL/L
CREAT SERPL-MCNC: 0.97 MG/DL
GLUCOSE SERPL-MCNC: 83 MG/DL
POTASSIUM SERPL-SCNC: 4.6 MMOL/L
PROT SERPL-MCNC: 6.2 G/DL
PSA SERPL-MCNC: 0.23 NG/ML
SODIUM SERPL-SCNC: 144 MMOL/L

## 2019-03-09 NOTE — REASON FOR VISIT
[Follow-Up Visit] : a follow-up [Spouse] : spouse [FreeTextEntry2] : metastatic adenocarcinoma of the prostate

## 2019-03-09 NOTE — ASSESSMENT
[FreeTextEntry1] : Hormone sensitive metastatic prostate cancer with mets to bone. S/p RT to spine. Decreasing PSA.\par \par Current NCCN guidelines for castration-naive metastatic prostate cancer to bone calls for \par 1) Continuation of ADT - will stay on Lupron/Casodex\par 2) Aggressive bisphosphonate use - Xgeva monthly\par 3) The use of either Taxotere q3w x 6 doses or more recently initiation of Zytiga (abiraterone)/Prednisone at diagnosis. Zytiga tablets favorable, given severe weakness at initial presentation.\par \par Plan:\par -Will review new PSA level as it becomes available \par -Continue Lupron/Casodex and Zytiga/Prednisone and monthly Xgeva. However, Lupron will be adjusted from 7.5 mg monthly to 22.5 mg [id number] after today's dose  \par -Fentanyl patch adjusted from 50 to 25 MCG/HR\par -Encouraged to hydrate often\par -Encouraged to call at any point to speak with dietician\par -Follow up in one month

## 2019-03-09 NOTE — ADDENDUM
[FreeTextEntry1] : All medical record entries made by valarie Larkin were at Dr. Jayme Canseco's direction and personally dictated by me on (3/5/2019).

## 2019-03-19 ENCOUNTER — APPOINTMENT (OUTPATIENT)
Dept: ORTHOPEDIC SURGERY | Facility: CLINIC | Age: 59
End: 2019-03-19
Payer: COMMERCIAL

## 2019-03-19 VITALS
DIASTOLIC BLOOD PRESSURE: 89 MMHG | HEIGHT: 73 IN | BODY MASS INDEX: 24.78 KG/M2 | HEART RATE: 82 BPM | WEIGHT: 187 LBS | SYSTOLIC BLOOD PRESSURE: 142 MMHG

## 2019-03-19 PROCEDURE — 72080 X-RAY EXAM THORACOLMB 2/> VW: CPT

## 2019-03-19 PROCEDURE — 99214 OFFICE O/P EST MOD 30 MIN: CPT

## 2019-03-19 NOTE — HISTORY OF PRESENT ILLNESS
[de-identified] : 59 year old M presents with lumbar spine pain. Low back pain. Had T12 metastasis prostate cancer. He is status post radiation therapy and is now on adjuvant chemotherapy from Dr. Canseco. He states his pain is stable on fentanyl. He does have pain intermittently radiating not his torso however he does not have any leg weakness no change in bowel or bladder no radiculitis or legs. He will may go back to work at the end of the month depending on the results of his upcoming CAT scan and MRI of his lumbar spine. [Ataxia] : no ataxia [Incontinence] : no incontinence [Urinary Ret.] : no urinary retention [Loss of Dexterity] : good dexterity

## 2019-03-19 NOTE — DISCUSSION/SUMMARY
[Surgical risks reviewed] : Surgical risks reviewed [de-identified] : CT and MRI of thoracic spine to evaluate for impending T12 fx due to metastatic CA. We will call him with the results. He will come back in 1 month. He will speak to Dr. Canseco about his chemotherapy symptoms including mood swings.

## 2019-03-19 NOTE — ADDENDUM
[FreeTextEntry1] : Documented by Fernando Martines acting as a scribe for PAUL Fournier on 03/19/2019\par All medical record entries made by the Scribe were at my, PAUL Fournier, direction and personally dictated by me on 03/19/2019 . I have reviewed the chart and agree that the record accurately reflects my personal performance of the history, physical exam, assessment and plan. I have also personally directed, reviewed, and agreed with the chart.

## 2019-03-19 NOTE — REVIEW OF SYSTEMS
[Joint Pain] : joint pain [Negative] : Heme/Lymph [Fever] : no fever [Cough] : no cough [Chills] : no chills [SOB on Exertion] : no shortness of breath on exertion

## 2019-03-19 NOTE — PHYSICAL EXAM
[Acute Distress] : not in acute distress [Poor Appearance] : well-appearing [de-identified] : CONSTITUTIONAL: The patient is a very pleasant individual who is well-nourished and who appears stated age.\par PSYCHIATRIC: The patient is alert and oriented X 3 and in no apparent distress, and participates with orthopedic evaluation well.\par HEAD: Atraumatic and is nonsyndromic in appearance.\par EENT: No visible thyromegaly, EOMI.\par RESPIRATORY: Respiratory rate is regular, not dyspneic on examination.\par LYMPHATICS: There is no inguinal lymphadenopathy\par INTEGUMENTARY: Skin is clean, dry, and intact about the bilateral lower extremities and lumbar spine.\par VASCULAR: There is brisk capillary refill about the bilateral lower extremities.\par NEUROLOGIC: There are no pathologic reflexes. There is no decrease in sensation of the bilateral lower extremities on Wartenberg pinwheel examination. Deep tendon reflexes are well maintained at 2+/4 of the bilateral lower extremities and are symmetric.\par MUSCULOSKELETAL: There is no visible muscular atrophy. Manual motor strength is well maintained in the bilateral lower extremities. Range of motion of lumbar spine is well maintained. The patient ambulates in a non-myelopathic manner. Negative tension sign and straight leg raise bilaterally. Quad extension, ankle dorsiflexion, EHL, plantar flexion, and ankle eversion are well preserved. Normal secondary orthopaedic exam of bilateral hips, greater trochanteric area, knees and ankles \par \par mild tenderness palpation of the thoracolumbar junction [Obese] : not obese [de-identified] : X-ray of the thoracic spine taken today shows stable T11 and T12 compression fx's.

## 2019-03-25 ENCOUNTER — OUTPATIENT (OUTPATIENT)
Dept: OUTPATIENT SERVICES | Facility: HOSPITAL | Age: 59
LOS: 1 days | Discharge: HOME | End: 2019-03-25

## 2019-03-25 DIAGNOSIS — C79.51 SECONDARY MALIGNANT NEOPLASM OF BONE: ICD-10-CM

## 2019-03-25 DIAGNOSIS — C61 MALIGNANT NEOPLASM OF PROSTATE: ICD-10-CM

## 2019-03-27 ENCOUNTER — OUTPATIENT (OUTPATIENT)
Dept: OUTPATIENT SERVICES | Facility: HOSPITAL | Age: 59
LOS: 1 days | End: 2019-03-27
Payer: COMMERCIAL

## 2019-03-27 ENCOUNTER — APPOINTMENT (OUTPATIENT)
Dept: CT IMAGING | Facility: CLINIC | Age: 59
End: 2019-03-27
Payer: COMMERCIAL

## 2019-03-27 ENCOUNTER — APPOINTMENT (OUTPATIENT)
Dept: MRI IMAGING | Facility: CLINIC | Age: 59
End: 2019-03-27
Payer: COMMERCIAL

## 2019-03-27 DIAGNOSIS — S22.089A UNSPECIFIED FRACTURE OF T11-T12 VERTEBRA, INITIAL ENCOUNTER FOR CLOSED FRACTURE: ICD-10-CM

## 2019-03-27 DIAGNOSIS — C79.51 SECONDARY MALIGNANT NEOPLASM OF BONE: ICD-10-CM

## 2019-03-27 DIAGNOSIS — M54.5 LOW BACK PAIN: ICD-10-CM

## 2019-03-27 PROCEDURE — 76377 3D RENDER W/INTRP POSTPROCES: CPT | Mod: 26

## 2019-03-27 PROCEDURE — 72128 CT CHEST SPINE W/O DYE: CPT

## 2019-03-27 PROCEDURE — 76377 3D RENDER W/INTRP POSTPROCES: CPT

## 2019-03-27 PROCEDURE — 72146 MRI CHEST SPINE W/O DYE: CPT

## 2019-03-27 PROCEDURE — 72128 CT CHEST SPINE W/O DYE: CPT | Mod: 26

## 2019-03-27 PROCEDURE — 72146 MRI CHEST SPINE W/O DYE: CPT | Mod: 26

## 2019-03-29 ENCOUNTER — APPOINTMENT (OUTPATIENT)
Dept: ORTHOPEDIC SURGERY | Facility: CLINIC | Age: 59
End: 2019-03-29
Payer: COMMERCIAL

## 2019-03-29 VITALS
WEIGHT: 187 LBS | SYSTOLIC BLOOD PRESSURE: 145 MMHG | DIASTOLIC BLOOD PRESSURE: 85 MMHG | HEART RATE: 87 BPM | BODY MASS INDEX: 24.78 KG/M2 | HEIGHT: 73 IN

## 2019-03-29 DIAGNOSIS — G89.3 NEOPLASM RELATED PAIN (ACUTE) (CHRONIC): ICD-10-CM

## 2019-03-29 PROCEDURE — 99214 OFFICE O/P EST MOD 30 MIN: CPT

## 2019-03-29 NOTE — DISCUSSION/SUMMARY
[de-identified] : I have recommended that the pt continue with a conservative treatment plan. Light duty at this point in time. Pt should avoid heavy lifting, and repetitive bending, twisting, etc. The patient will follow up in 2 months for a repeat clinical assessment.

## 2019-03-29 NOTE — HISTORY OF PRESENT ILLNESS
[de-identified] : 59 year old M presents with thoracic spine pain and MRI review with regard to metastatic disease. He states his back pain is 1 or 2/10. If he has a busy day he has some back pain at the end of the day. He has an appointment with Dr. Canseco next week. KARL questionnaire is negative.  [Ataxia] : no ataxia [Incontinence] : no incontinence [Loss of Dexterity] : good dexterity [Urinary Ret.] : no urinary retention

## 2019-03-29 NOTE — ADDENDUM
[FreeTextEntry1] : Documented by Fernando Martines acting as a scribe for Dr. Anthony Flores on 03/29/2019 . All medical record entries made by the Scribe were at my, Dr. Anthony Flores, direction and personally dictated by me on 03/29/2019 . I have reviewed the chart and agree that the record accurately reflects my personal performance of the history, physical exam, assessment and plan. I have also personally directed, reviewed, and agreed with the chart.

## 2019-03-29 NOTE — PHYSICAL EXAM
[Poor Appearance] : well-appearing [Acute Distress] : not in acute distress [Obese] : not obese [de-identified] : CONSTITUTIONAL: The patient is a very pleasant individual who is well-nourished and who appears stated age.\par PSYCHIATRIC: The patient is alert and oriented X 3 and in no apparent distress, and participates with orthopedic evaluation well.\par HEAD: Atraumatic and is nonsyndromic in appearance.\par EENT: No visible thyromegaly, EOMI.\par RESPIRATORY: Respiratory rate is regular, not dyspneic on examination.\par LYMPHATICS: There is no inguinal lymphadenopathy\par INTEGUMENTARY: Skin is clean, dry, and intact about the bilateral lower extremities and lumbar spine.\par VASCULAR: There is brisk capillary refill about the bilateral lower extremities.\par NEUROLOGIC: There are no pathologic reflexes. There is no decrease in sensation of the bilateral lower extremities on Wartenberg pinwheel examination. Deep tendon reflexes are well maintained at 2+/4 of the bilateral lower extremities and are symmetric.\par MUSCULOSKELETAL: There is no visible muscular atrophy. Manual motor strength is well maintained in the bilateral lower extremities. Range of motion of lumbar spine is well maintained. The patient ambulates in a non-myelopathic manner. Negative tension sign and straight leg raise bilaterally. Quad extension, ankle dorsiflexion, EHL, plantar flexion, and ankle eversion are well preserved. Normal secondary orthopaedic exam of bilateral hips, greater trochanteric area, knees and ankles \par \par Mechanically orientated thoracic back pain  [de-identified] : MRI and CT show T12, T11, and L1 metastatic disease and mild pathologic compression fx's.

## 2019-04-01 ENCOUNTER — RX RENEWAL (OUTPATIENT)
Age: 59
End: 2019-04-01

## 2019-04-02 ENCOUNTER — RESULT REVIEW (OUTPATIENT)
Age: 59
End: 2019-04-02

## 2019-04-02 ENCOUNTER — APPOINTMENT (OUTPATIENT)
Age: 59
End: 2019-04-02

## 2019-04-02 ENCOUNTER — APPOINTMENT (OUTPATIENT)
Dept: HEMATOLOGY ONCOLOGY | Facility: CLINIC | Age: 59
End: 2019-04-02
Payer: COMMERCIAL

## 2019-04-02 VITALS
SYSTOLIC BLOOD PRESSURE: 132 MMHG | OXYGEN SATURATION: 97 % | WEIGHT: 200.04 LBS | BODY MASS INDEX: 26.51 KG/M2 | DIASTOLIC BLOOD PRESSURE: 83 MMHG | TEMPERATURE: 98.6 F | HEIGHT: 73 IN | HEART RATE: 86 BPM

## 2019-04-02 LAB
BASOPHILS # BLD AUTO: 0.1 K/UL — SIGNIFICANT CHANGE UP (ref 0–0.2)
BASOPHILS NFR BLD AUTO: 1.1 % — SIGNIFICANT CHANGE UP (ref 0–2)
EOSINOPHIL # BLD AUTO: 0 K/UL — SIGNIFICANT CHANGE UP (ref 0–0.5)
EOSINOPHIL NFR BLD AUTO: 0.6 % — SIGNIFICANT CHANGE UP (ref 0–6)
HCT VFR BLD CALC: 43 % — SIGNIFICANT CHANGE UP (ref 39–50)
HGB BLD-MCNC: 13 G/DL — SIGNIFICANT CHANGE UP (ref 13–17)
LYMPHOCYTES # BLD AUTO: 0.6 K/UL — LOW (ref 1–3.3)
LYMPHOCYTES # BLD AUTO: 7.3 % — LOW (ref 13–44)
MCHC RBC-ENTMCNC: 26.9 PG — LOW (ref 27–34)
MCHC RBC-ENTMCNC: 30.4 G/DL — LOW (ref 32–36)
MCV RBC AUTO: 88.6 FL — SIGNIFICANT CHANGE UP (ref 80–100)
MONOCYTES # BLD AUTO: 0.7 K/UL — SIGNIFICANT CHANGE UP (ref 0–0.9)
MONOCYTES NFR BLD AUTO: 8.2 % — SIGNIFICANT CHANGE UP (ref 2–14)
NEUTROPHILS # BLD AUTO: 6.7 K/UL — SIGNIFICANT CHANGE UP (ref 1.8–7.4)
NEUTROPHILS NFR BLD AUTO: 82.8 % — HIGH (ref 43–77)
PLATELET # BLD AUTO: 232 K/UL — SIGNIFICANT CHANGE UP (ref 150–400)
RBC # BLD: 4.85 M/UL — SIGNIFICANT CHANGE UP (ref 4.2–5.8)
RBC # FLD: 16 % — HIGH (ref 10.3–14.5)
WBC # BLD: 8.2 K/UL — SIGNIFICANT CHANGE UP (ref 3.8–10.5)
WBC # FLD AUTO: 8.2 K/UL — SIGNIFICANT CHANGE UP (ref 3.8–10.5)

## 2019-04-02 PROCEDURE — 99213 OFFICE O/P EST LOW 20 MIN: CPT

## 2019-04-02 RX ORDER — MULTIVITAMIN
TABLET ORAL
Refills: 0 | Status: ACTIVE | COMMUNITY

## 2019-04-02 RX ORDER — CYCLOBENZAPRINE HYDROCHLORIDE 5 MG/1
5 TABLET, FILM COATED ORAL 3 TIMES DAILY
Qty: 90 | Refills: 0 | Status: DISCONTINUED | COMMUNITY
Start: 2018-10-19 | End: 2019-04-02

## 2019-04-02 RX ORDER — DIAZEPAM 5 MG/1
5 TABLET ORAL
Qty: 1 | Refills: 0 | Status: DISCONTINUED | COMMUNITY
Start: 2018-11-08 | End: 2019-04-02

## 2019-04-03 ENCOUNTER — MEDICATION RENEWAL (OUTPATIENT)
Age: 59
End: 2019-04-03

## 2019-04-03 ENCOUNTER — MED ADMIN CHARGE (OUTPATIENT)
Age: 59
End: 2019-04-03

## 2019-04-04 LAB
ALBUMIN SERPL ELPH-MCNC: 4.5 G/DL
ALP BLD-CCNC: 108 U/L
ALT SERPL-CCNC: 26 U/L
ANION GAP SERPL CALC-SCNC: 11 MMOL/L
AST SERPL-CCNC: 22 U/L
BILIRUB SERPL-MCNC: 0.4 MG/DL
BUN SERPL-MCNC: 15 MG/DL
CALCIUM SERPL-MCNC: 9.7 MG/DL
CHLORIDE SERPL-SCNC: 104 MMOL/L
CO2 SERPL-SCNC: 28 MMOL/L
CREAT SERPL-MCNC: 1.14 MG/DL
GLUCOSE SERPL-MCNC: 101 MG/DL
POTASSIUM SERPL-SCNC: 4.8 MMOL/L
PROT SERPL-MCNC: 6.8 G/DL
PSA SERPL-MCNC: 0.12 NG/ML
SODIUM SERPL-SCNC: 143 MMOL/L

## 2019-04-09 ENCOUNTER — RX RENEWAL (OUTPATIENT)
Age: 59
End: 2019-04-09

## 2019-04-09 ENCOUNTER — APPOINTMENT (OUTPATIENT)
Dept: RADIATION ONCOLOGY | Facility: CLINIC | Age: 59
End: 2019-04-09
Payer: COMMERCIAL

## 2019-04-09 PROCEDURE — 99213 OFFICE O/P EST LOW 20 MIN: CPT

## 2019-04-09 NOTE — PHYSICAL EXAM
[No Focal Deficits] : no focal deficits [Normal] : oriented to person, place and time, the affect was normal, the mood was normal and not anxious [FreeTextEntry1] : deferred [de-identified] : deferred [de-identified] : moderately tanned skin over midline.; some limitations in ROM [de-identified] : wears supportive torso brace 1/2 day

## 2019-04-09 NOTE — HISTORY OF PRESENT ILLNESS
[FreeTextEntry1] : This 59 year-old male is being seen for routine follow-up.  He completed radiation therapy on for metastatic adenocarcinoma of the prostate to T4-9 & T12-S3, completed 12/18/2018.\par \par Initially Mr. 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  He was admitted to the emergency room on 12/6/18 for increasing lower back pain and difficulty ambulating. He reported significant improvement following the prescription of fentanyl (patch) 50 mcg. \par \par He reports his back pain has diminished since completing radiation therapy.  He follows with Dr. Canseco and is maintained on Lupron injections, Xgeva, and Zytiga.  Continues wearing brace initially given in hospital for the later 1/2 of the day.  He reports he is again working full time.  Follows with Dr. Alonso also.\par

## 2019-04-09 NOTE — DISEASE MANAGEMENT
[Clinical] : TNM Stage: c [IV] : IV [TTNM] : 1c [NTNM] : 1 [MTNM] : 1b [de-identified] : 2000cGy [de-identified] : T4-T9;  T12-S3/SI joints

## 2019-04-09 NOTE — VITALS
[Pain Description/Quality: ___] : Pain description/quality: [unfilled] [Maximal Pain Intensity: 3/10] : 3/10 [Least Pain Intensity: 1/10] : 1/10 [Pain Location: ___] : Pain Location: [unfilled] [Pain Duration: ___] : Pain duration: [unfilled] [Pain Interferes with ADLs] : Pain interferes with activities of daily living. [Opioid] : opioid [80: Normal activity with effort; some signs or symptoms of disease.] : 80: Normal activity with effort; some signs or symptoms of disease.  [ECOG Performance Status: 1 - Restricted in physically strenuous activity but ambulatory and able to carry out work of a light or sedentary nature] : Performance Status: 1 - Restricted in physically strenuous activity but ambulatory and able to carry out work of a light or sedentary nature, e.g., light house work, office work

## 2019-04-09 NOTE — REVIEW OF SYSTEMS
[IPSS Score (0-40): ___] : IPSS score: [unfilled] [EPIC-CP Score (0-60): ___] : EPIC-CP score: [unfilled] [FreeTextEntry9] : continues to wear back brace later 1/2 of day for stability. [Negative] : Neurological

## 2019-04-09 NOTE — LETTER GREETING
[Dear Doctor] : Dear Doctor, [Please see my note below.] : Please see my note below. [Follow-Up] : Your patient, [unfilled] was seen in my office today for follow-up [FreeTextEntry2] : Jayme Canseco MD

## 2019-04-09 NOTE — LETTER CLOSING
[Sincerely yours,] : Sincerely yours, [FreeTextEntry3] : Naresh King MD\par Physician in Chief\par Department of Radiation Medicine\par [facility] [place]\par [facility] [place]\par \par  of Radiation Medicine\par [facility]\par at  [facility]\par \par Radiation \par [facility]/\par [facility] [place]\par [address]\par [place], [place] [address]\par \par Tel: [phone]\par Fax: [phone]\par

## 2019-04-09 NOTE — REASON FOR VISIT
[Routine Follow-Up] : routine follow-up visit for [Bone Metastasis] : bone metastasis [Prostate Cancer] : prostate cancer [Spouse] : spouse

## 2019-04-09 NOTE — DATA REVIEWED
[No studies available for review at this time.] : No studies available for review at this time. [FreeTextEntry1] : labs

## 2019-04-22 NOTE — ASSESSMENT
[FreeTextEntry1] : -Will review new PSA level as it becomes available \par -Continue Lupron/Casodex and Zytiga/Prednisone and monthly Xgeva.\par F/U with Dr. Canseco in July.\par

## 2019-04-22 NOTE — HISTORY OF PRESENT ILLNESS
[de-identified] : Mr. Colt Cedeno is a 59 year old male who presents for a follow up visit for severe advanced prostate cancer with painful bone mets. He was diagnosed with prostrate cancer by urologist Dr. Dylon Franklin. He was admitted to the emergency room on 12/6/18 for increasing lower back pain and difficulty ambulating, with significant improvements from fentanyl (patch). He is s/p palliative radiation therapy to spine. Began Lupron and Casodex on recent [facility] admission, and was started on ketoconazole by Urology. He has since transitioned to Lupron/Zytiga/Prednisone hormonal therapy [de-identified] : tolerating Lupron ( now Q 3 months ), Xgeva and Zytiga and prednisone. pain is improved on fentanyl

## 2019-05-01 ENCOUNTER — MEDICATION RENEWAL (OUTPATIENT)
Age: 59
End: 2019-05-01

## 2019-05-08 ENCOUNTER — MEDICATION RENEWAL (OUTPATIENT)
Age: 59
End: 2019-05-08

## 2019-05-29 ENCOUNTER — MEDICATION RENEWAL (OUTPATIENT)
Age: 59
End: 2019-05-29

## 2019-05-31 ENCOUNTER — APPOINTMENT (OUTPATIENT)
Dept: ORTHOPEDIC SURGERY | Facility: CLINIC | Age: 59
End: 2019-05-31
Payer: COMMERCIAL

## 2019-05-31 VITALS
DIASTOLIC BLOOD PRESSURE: 87 MMHG | BODY MASS INDEX: 26.51 KG/M2 | SYSTOLIC BLOOD PRESSURE: 141 MMHG | HEART RATE: 72 BPM | HEIGHT: 73 IN | WEIGHT: 200 LBS

## 2019-05-31 PROCEDURE — 99214 OFFICE O/P EST MOD 30 MIN: CPT

## 2019-05-31 NOTE — PHYSICAL EXAM
[de-identified] : CONSTITUTIONAL: The patient is a very pleasant individual who is well-nourished and who appears stated age.\par PSYCHIATRIC: The patient is alert and oriented X 3 and in no apparent distress, and participates with orthopedic evaluation well.\par HEAD: Atraumatic and is nonsyndromic in appearance.\par EENT: No visible thyromegaly, EOMI.\par RESPIRATORY: Respiratory rate is regular, not dyspneic on examination.\par LYMPHATICS: There is no inguinal lymphadenopathy\par INTEGUMENTARY: Skin is clean, dry, and intact about the bilateral lower extremities and lumbar spine.\par VASCULAR: There is brisk capillary refill about the bilateral lower extremities.\par NEUROLOGIC: There are no pathologic reflexes. There is no decrease in sensation of the bilateral lower extremities on Wartenberg pinwheel examination. Deep tendon reflexes are well maintained at 2+/4 of the bilateral lower extremities and are symmetric..\par MUSCULOSKELETAL: There is no visible muscular atrophy. Manual motor strength is well maintained in the bilateral lower extremities. Range of motion of lumbar spine is well maintained. The patient ambulates in a non-myelopathic manner. Negative tension sign and straight leg raise bilaterally. Quad extension, ankle dorsiflexion, EHL, plantar flexion, and ankle eversion are well preserved. Normal secondary orthopaedic exam of bilateral hips, greater trochanteric area, knees and ankles\par No significant pain with range of motion and/or palpation

## 2019-05-31 NOTE — DISCUSSION/SUMMARY
[de-identified] : Patient be following up with his hematology oncology team. He states his thoracolumbar pain is much improved at this point in time. Based upon no significant canal compromise or pathologic fracture that would require stabilization I do not think operative management is warranted at this point in time patient will followup in approximately 4-6 months we'll discuss obtaining a repeat MRI to check his spinal column and its relation to metastatic disease patient was instructed on follow up on an ASAP basis of signs or symptoms should worsen

## 2019-05-31 NOTE — HISTORY OF PRESENT ILLNESS
[de-identified] : 59 year old M presents with no  thoracic spine pain. Patient states his return to work at this point in time as a  is doing light duty. He has no complaints of thoracic pain no complaints of lumbar pain he states after radiation his pain is significantly improved. Patient's KARL questionnaire is negative [Improving] : improving [0] : a current pain level of 0/10

## 2019-06-04 ENCOUNTER — RX RENEWAL (OUTPATIENT)
Age: 59
End: 2019-06-04

## 2019-06-28 ENCOUNTER — OUTPATIENT (OUTPATIENT)
Dept: OUTPATIENT SERVICES | Facility: HOSPITAL | Age: 59
LOS: 1 days | Discharge: HOME | End: 2019-06-28

## 2019-06-28 DIAGNOSIS — C79.51 SECONDARY MALIGNANT NEOPLASM OF BONE: ICD-10-CM

## 2019-06-28 DIAGNOSIS — C61 MALIGNANT NEOPLASM OF PROSTATE: ICD-10-CM

## 2019-07-02 ENCOUNTER — APPOINTMENT (OUTPATIENT)
Age: 59
End: 2019-07-02

## 2019-07-02 ENCOUNTER — MEDICATION RENEWAL (OUTPATIENT)
Age: 59
End: 2019-07-02

## 2019-07-09 ENCOUNTER — APPOINTMENT (OUTPATIENT)
Dept: HEMATOLOGY ONCOLOGY | Facility: CLINIC | Age: 59
End: 2019-07-09
Payer: COMMERCIAL

## 2019-07-09 ENCOUNTER — RESULT REVIEW (OUTPATIENT)
Age: 59
End: 2019-07-09

## 2019-07-09 ENCOUNTER — APPOINTMENT (OUTPATIENT)
Age: 59
End: 2019-07-09

## 2019-07-09 VITALS
TEMPERATURE: 98 F | HEART RATE: 69 BPM | BODY MASS INDEX: 26.91 KG/M2 | WEIGHT: 203.06 LBS | OXYGEN SATURATION: 97 % | SYSTOLIC BLOOD PRESSURE: 150 MMHG | DIASTOLIC BLOOD PRESSURE: 93 MMHG | HEIGHT: 73 IN

## 2019-07-09 LAB
BASOPHILS # BLD AUTO: 0.1 K/UL — SIGNIFICANT CHANGE UP (ref 0–0.2)
BASOPHILS NFR BLD AUTO: 0.9 % — SIGNIFICANT CHANGE UP (ref 0–2)
EOSINOPHIL # BLD AUTO: 0.1 K/UL — SIGNIFICANT CHANGE UP (ref 0–0.5)
EOSINOPHIL NFR BLD AUTO: 1.2 % — SIGNIFICANT CHANGE UP (ref 0–6)
HCT VFR BLD CALC: 41.9 % — SIGNIFICANT CHANGE UP (ref 39–50)
HGB BLD-MCNC: 13.3 G/DL — SIGNIFICANT CHANGE UP (ref 13–17)
LYMPHOCYTES # BLD AUTO: 0.9 K/UL — LOW (ref 1–3.3)
LYMPHOCYTES # BLD AUTO: 13.5 % — SIGNIFICANT CHANGE UP (ref 13–44)
MCHC RBC-ENTMCNC: 27.7 PG — SIGNIFICANT CHANGE UP (ref 27–34)
MCHC RBC-ENTMCNC: 31.7 G/DL — LOW (ref 32–36)
MCV RBC AUTO: 87.2 FL — SIGNIFICANT CHANGE UP (ref 80–100)
MONOCYTES # BLD AUTO: 1.1 K/UL — HIGH (ref 0–0.9)
MONOCYTES NFR BLD AUTO: 15.5 % — HIGH (ref 2–14)
NEUTROPHILS # BLD AUTO: 4.8 K/UL — SIGNIFICANT CHANGE UP (ref 1.8–7.4)
NEUTROPHILS NFR BLD AUTO: 68.9 % — SIGNIFICANT CHANGE UP (ref 43–77)
PLATELET # BLD AUTO: 210 K/UL — SIGNIFICANT CHANGE UP (ref 150–400)
RBC # BLD: 4.8 M/UL — SIGNIFICANT CHANGE UP (ref 4.2–5.8)
RBC # FLD: 13.1 % — SIGNIFICANT CHANGE UP (ref 10.3–14.5)
WBC # BLD: 6.9 K/UL — SIGNIFICANT CHANGE UP (ref 3.8–10.5)
WBC # FLD AUTO: 6.9 K/UL — SIGNIFICANT CHANGE UP (ref 3.8–10.5)

## 2019-07-09 PROCEDURE — 99214 OFFICE O/P EST MOD 30 MIN: CPT

## 2019-07-10 LAB
ALBUMIN SERPL ELPH-MCNC: 4.4 G/DL
ALP BLD-CCNC: 83 U/L
ALT SERPL-CCNC: 18 U/L
ANION GAP SERPL CALC-SCNC: 15 MMOL/L
AST SERPL-CCNC: 18 U/L
BILIRUB SERPL-MCNC: 0.2 MG/DL
BUN SERPL-MCNC: 16 MG/DL
CALCIUM SERPL-MCNC: 10.4 MG/DL
CHLORIDE SERPL-SCNC: 102 MMOL/L
CO2 SERPL-SCNC: 26 MMOL/L
CREAT SERPL-MCNC: 1.52 MG/DL
GLUCOSE SERPL-MCNC: 90 MG/DL
POTASSIUM SERPL-SCNC: 4.6 MMOL/L
PROT SERPL-MCNC: 6.7 G/DL
PSA SERPL-MCNC: 0.03 NG/ML
SODIUM SERPL-SCNC: 143 MMOL/L

## 2019-07-11 NOTE — ADDENDUM
[FreeTextEntry1] : I, Aric Osborne, acted solely as scribe for Dr. Jayme Canseco MD on this date 07/09/2019  9:15AM .\par \par All medical record entries made by the Scribe were at my, Dr. Jayme Canseco MD direction and personally dictated by me on 07/09/2019  9:15AM. I have reviewed the chart and agree that the record accurately reflects my personal performance of the history, physical exam, assessment and plan. I have also personally directed, reviewed and agreed with the chart.\par

## 2019-07-11 NOTE — ASSESSMENT
[FreeTextEntry1] : 7/10/19\par PSA: 0.03\par \par Plan:\par - Continue Lupron/Casodex, Xgeva, and Zytiga/Prednisone\par - Follow up in 3 months \par

## 2019-07-11 NOTE — HISTORY OF PRESENT ILLNESS
[de-identified] : Mr. Colt Cedeno is a 59 year old male who presents for a follow up visit for severe advanced prostate cancer with painful bone mets. He was diagnosed with prostrate cancer by urologist Dr. Dylon Franklin. He was admitted to the emergency room on 12/6/18 for increasing lower back pain and difficulty ambulating, with significant improvements from fentanyl (patch). He is s/p palliative radiation therapy to spine. Began Lupron and Casodex on recent [facility] admission, and was started on ketoconazole by Urology. He has since transitioned to Lupron/Zytiga/Prednisone hormonal therapy [de-identified] : Patient is tolerating Lupron ( now Q 3 months ), Xgeva, Zytiga, and Prednisone. He states he is no longer using the Fentanyl patch. He also notes cutting Oxycodone dosage in half and uses the medication only as needed if pain is exacerbated.

## 2019-08-05 ENCOUNTER — RX RENEWAL (OUTPATIENT)
Age: 59
End: 2019-08-05

## 2019-08-21 ENCOUNTER — MEDICATION RENEWAL (OUTPATIENT)
Age: 59
End: 2019-08-21

## 2019-09-16 ENCOUNTER — APPOINTMENT (OUTPATIENT)
Dept: INTERNAL MEDICINE | Facility: CLINIC | Age: 59
End: 2019-09-16
Payer: COMMERCIAL

## 2019-09-16 VITALS — HEIGHT: 73 IN | BODY MASS INDEX: 27.57 KG/M2 | WEIGHT: 208 LBS

## 2019-09-16 VITALS — HEART RATE: 80 BPM | SYSTOLIC BLOOD PRESSURE: 130 MMHG | DIASTOLIC BLOOD PRESSURE: 90 MMHG | RESPIRATION RATE: 16 BRPM

## 2019-09-16 DIAGNOSIS — N52.9 MALE ERECTILE DYSFUNCTION, UNSPECIFIED: ICD-10-CM

## 2019-09-16 DIAGNOSIS — R68.82 DECREASED LIBIDO: ICD-10-CM

## 2019-09-16 PROCEDURE — 99214 OFFICE O/P EST MOD 30 MIN: CPT

## 2019-09-16 NOTE — PHYSICAL EXAM
[No Acute Distress] : no acute distress [Well Developed] : well developed [Well Nourished] : well nourished [Well-Appearing] : well-appearing [Normal Sclera/Conjunctiva] : normal sclera/conjunctiva [PERRL] : pupils equal round and reactive to light [EOMI] : extraocular movements intact [Normal Outer Ear/Nose] : the outer ears and nose were normal in appearance [Normal Oropharynx] : the oropharynx was normal [No JVD] : no jugular venous distention [No Lymphadenopathy] : no lymphadenopathy [Supple] : supple [No Respiratory Distress] : no respiratory distress  [Thyroid Normal, No Nodules] : the thyroid was normal and there were no nodules present [No Accessory Muscle Use] : no accessory muscle use [Clear to Auscultation] : lungs were clear to auscultation bilaterally [Normal Rate] : normal rate  [Regular Rhythm] : with a regular rhythm [Normal S1, S2] : normal S1 and S2 [No Murmur] : no murmur heard [No Carotid Bruits] : no carotid bruits [No Abdominal Bruit] : a ~M bruit was not heard ~T in the abdomen [Pedal Pulses Present] : the pedal pulses are present [No Varicosities] : no varicosities [No Edema] : there was no peripheral edema [No Palpable Aorta] : no palpable aorta [No Extremity Clubbing/Cyanosis] : no extremity clubbing/cyanosis [Soft] : abdomen soft [Non Tender] : non-tender [Non-distended] : non-distended [No Masses] : no abdominal mass palpated [No HSM] : no HSM [Normal Posterior Cervical Nodes] : no posterior cervical lymphadenopathy [Normal Bowel Sounds] : normal bowel sounds [Normal Anterior Cervical Nodes] : no anterior cervical lymphadenopathy [No CVA Tenderness] : no CVA  tenderness [No Spinal Tenderness] : no spinal tenderness [No Joint Swelling] : no joint swelling [Grossly Normal Strength/Tone] : grossly normal strength/tone [No Rash] : no rash [Coordination Grossly Intact] : coordination grossly intact [No Focal Deficits] : no focal deficits [Normal Gait] : normal gait [Deep Tendon Reflexes (DTR)] : deep tendon reflexes were 2+ and symmetric [Normal Affect] : the affect was normal [Normal Insight/Judgement] : insight and judgment were intact

## 2019-09-16 NOTE — ASSESSMENT
[FreeTextEntry1] : libido problems related to prostate cancer treatment which have been very successful in containing disease\par he has tried viagra which are really the only safe clss of medications for his problems\par he has advanced prostate cancer so testosterone replacement is absoultely not possible\par needs to discuss with his wife his concerns so he is not burdened with his performance\par men and women not wired the same ways nor do they have the same needs with regards to intimacy\par

## 2019-09-16 NOTE — HISTORY OF PRESENT ILLNESS
[FreeTextEntry1] : for follow up [de-identified] : patient last year diagnosed with met prostate cancer to the spine\par he has been on multipile therapies and psa is now .03\par he has backpain but does not need patch and functions with the oxyccodone\par he is most concerned about his lack of sexual desire and no erections\par since starting treatment, he tried viagra without successs

## 2019-09-26 ENCOUNTER — OUTPATIENT (OUTPATIENT)
Dept: OUTPATIENT SERVICES | Facility: HOSPITAL | Age: 59
LOS: 1 days | Discharge: HOME | End: 2019-09-26

## 2019-09-26 DIAGNOSIS — C61 MALIGNANT NEOPLASM OF PROSTATE: ICD-10-CM

## 2019-09-26 DIAGNOSIS — C79.51 SECONDARY MALIGNANT NEOPLASM OF BONE: ICD-10-CM

## 2019-09-27 ENCOUNTER — APPOINTMENT (OUTPATIENT)
Dept: ORTHOPEDIC SURGERY | Facility: CLINIC | Age: 59
End: 2019-09-27
Payer: COMMERCIAL

## 2019-09-27 VITALS
DIASTOLIC BLOOD PRESSURE: 92 MMHG | HEART RATE: 66 BPM | HEIGHT: 73 IN | BODY MASS INDEX: 27.57 KG/M2 | WEIGHT: 208 LBS | SYSTOLIC BLOOD PRESSURE: 153 MMHG

## 2019-09-27 DIAGNOSIS — S22.089A UNSPECIFIED FRACTURE OF T11-T12 VERTEBRA, INITIAL ENCOUNTER FOR CLOSED FRACTURE: ICD-10-CM

## 2019-09-27 PROCEDURE — 99214 OFFICE O/P EST MOD 30 MIN: CPT

## 2019-09-27 PROCEDURE — 72080 X-RAY EXAM THORACOLMB 2/> VW: CPT | Mod: TC

## 2019-09-27 NOTE — HISTORY OF PRESENT ILLNESS
[de-identified] : 59 year old M presents with lower thoracic spine pain. Patient states his return to work at this point in time as a  is doing regular duty. He has no complaints of thoracic pain which occasionally radiates around lower torso after working all day, being particularly acitve, no complaints of lumbar pain he states after radiation his pain was significantly improved. Patient's KARL questionnaire is negative.  No bowel or bladder changes, no lower extremity numbness or weakness.  \par No recent advanced imaging studies from Heme/Onc. \par He does state his urinary frequency has increased to every 2 hours or so but he does have control of his urinary bladder and bowel.

## 2019-09-27 NOTE — PHYSICAL EXAM
[de-identified] : CONSTITUTIONAL: The patient is a very pleasant individual who is well-nourished and who appears stated age.\par PSYCHIATRIC: The patient is alert and oriented X 3 and in no apparent distress, and participates with orthopedic evaluation well.\par HEAD: Atraumatic and is nonsyndromic in appearance.\par EENT: No visible thyromegaly, EOMI.\par RESPIRATORY: Respiratory rate is regular, not dyspneic on examination.\par LYMPHATICS: There is no inguinal lymphadenopathy\par INTEGUMENTARY: Skin is clean, dry, and intact about the bilateral lower extremities and lumbar spine.\par VASCULAR: There is brisk capillary refill about the bilateral lower extremities.\par NEUROLOGIC: There are no pathologic reflexes. There is no decrease in sensation of the bilateral lower extremities on Wartenberg pinwheel examination. Deep tendon reflexes are well maintained at 2+/4 of the bilateral lower extremities and are symmetric..\par MUSCULOSKELETAL: There is no visible muscular atrophy. Manual motor strength is well maintained in the bilateral lower extremities. Range of motion of lumbar spine is well maintained. The patient ambulates in a non-myelopathic manner. Negative tension sign and straight leg raise bilaterally. Quad extension, ankle dorsiflexion, EHL, plantar flexion, and ankle eversion are well preserved. Normal secondary orthopaedic exam of bilateral hips, greater trochanteric area, knees and ankles\par No significant pain with range of motion and/or palpation  [de-identified] : well preserved T11, T12 and L1 pathologic compression fractures on thoracolumbar xray today, appears unchanged when compared to last xray March 2019

## 2019-09-27 NOTE — DISCUSSION/SUMMARY
[de-identified] : From a spinal surgery standpoint it is our obligations to ensure that there are no metastases to his spine which are causing spinal canal compromise.  Cervical, thoracic, lumbar MRIs are ordered and are medically necessary to ensure that there is no encroachment upon the spinal canal, spinal cord, neural elements which may be causing his complaints of increased hairy frequency as well as radicular pain about his torso intermittently. Physical therapy for modalities and he can continue going to the gym for light aerobic activity light weightlifting as tolerated. 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 I will call him with his MRI results and he will follow up in 6 months or p.r.n.

## 2019-09-30 ENCOUNTER — RX RENEWAL (OUTPATIENT)
Age: 59
End: 2019-09-30

## 2019-10-01 ENCOUNTER — APPOINTMENT (OUTPATIENT)
Dept: HEMATOLOGY ONCOLOGY | Facility: CLINIC | Age: 59
End: 2019-10-01

## 2019-10-01 ENCOUNTER — APPOINTMENT (OUTPATIENT)
Age: 59
End: 2019-10-01

## 2019-10-02 ENCOUNTER — APPOINTMENT (OUTPATIENT)
Age: 59
End: 2019-10-02

## 2019-10-15 ENCOUNTER — APPOINTMENT (OUTPATIENT)
Dept: RADIATION ONCOLOGY | Facility: CLINIC | Age: 59
End: 2019-10-15
Payer: COMMERCIAL

## 2019-10-15 VITALS
DIASTOLIC BLOOD PRESSURE: 79 MMHG | WEIGHT: 218 LBS | TEMPERATURE: 98 F | BODY MASS INDEX: 28.89 KG/M2 | OXYGEN SATURATION: 97 % | HEART RATE: 96 BPM | HEIGHT: 73 IN | SYSTOLIC BLOOD PRESSURE: 156 MMHG | RESPIRATION RATE: 16 BRPM

## 2019-10-15 DIAGNOSIS — Z92.3 PERSONAL HISTORY OF IRRADIATION: ICD-10-CM

## 2019-10-15 PROCEDURE — 99213 OFFICE O/P EST LOW 20 MIN: CPT

## 2019-10-15 NOTE — REASON FOR VISIT
[Bone Metastasis] : bone metastasis [Routine Follow-Up] : routine follow-up visit for [Prostate Cancer] : prostate cancer [Spouse] : spouse

## 2019-10-16 PROBLEM — Z92.3 PERSONAL HISTORY OF RADIATION THERAPY: Status: ACTIVE | Noted: 2019-01-17

## 2019-10-16 NOTE — REVIEW OF SYSTEMS
[IPSS Score (0-40): ___] : IPSS score: [unfilled] [EPIC-CP Score (0-60): ___] : EPIC-CP score: [unfilled] [Negative] : Allergic/Immunologic [Joint Pain] : no joint pain [Muscle Weakness] : no muscle weakness [Muscle Pain] : no muscle pain [Disturbance Of Gait] : no gait disturbance [FreeTextEntry9] : s/p radiation therapy to the spine

## 2019-10-16 NOTE — LETTER CLOSING
[Sincerely yours,] : Sincerely yours, [FreeTextEntry3] : Naresh King MD\par Physician in Chief\par Department of Radiation Medicine\par [facility] [place]\par [facility] [place]\par \par  of Radiation Medicine\par [facility]\par at  [facility]\par \par Radiation \par [facility]/\par [facility] [place]\par [address]\par [place], [place] [address]\par \par Tel: [phone]\par Fax: [phone]\par

## 2019-10-16 NOTE — DISEASE MANAGEMENT
[Clinical] : TNM Stage: c [IV] : IV [FreeTextEntry4] : metastatic prostate cancer [NTNM] : 1 [TTNM] : 1c [MTNM] : 1b [de-identified] : 2000cGy [de-identified] : T4-T9;  T12-S3/SI joints

## 2019-10-16 NOTE — HISTORY OF PRESENT ILLNESS
[FreeTextEntry1] : This 59 year-old male is being seen for routine follow-up.  He completed radiation therapy (2,000for metastatic adenocarcinoma of the prostate to T4-9 & T12-S3, completed 12/18/2018.\par \par Initially Mr. 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  He was admitted to the emergency room on 12/6/18 for increasing lower back pain and difficulty ambulating. He reported significant improvement following the prescription of fentanyl (patch) 50 mcg. \par \par He reports his back pain has diminished since completing radiation therapy.  He follows with Dr. Canseco and is maintained on Lupron, bicalutamide, casodex, Xgeva, and Zytiga. Follows with Dr. Alonso.  Will be scheduling MRI soon.\par

## 2019-10-16 NOTE — PHYSICAL EXAM
[Normal] : no joint swelling, no clubbing or cyanosis of the fingernails and muscle strength and tone were normal [de-identified] : reports ongoing back discomfort

## 2019-10-23 ENCOUNTER — FORM ENCOUNTER (OUTPATIENT)
Age: 59
End: 2019-10-23

## 2019-10-24 ENCOUNTER — APPOINTMENT (OUTPATIENT)
Dept: MRI IMAGING | Facility: CLINIC | Age: 59
End: 2019-10-24
Payer: COMMERCIAL

## 2019-10-24 ENCOUNTER — OUTPATIENT (OUTPATIENT)
Dept: OUTPATIENT SERVICES | Facility: HOSPITAL | Age: 59
LOS: 1 days | End: 2019-10-24
Payer: COMMERCIAL

## 2019-10-24 DIAGNOSIS — Z00.00 ENCOUNTER FOR GENERAL ADULT MEDICAL EXAMINATION WITHOUT ABNORMAL FINDINGS: ICD-10-CM

## 2019-10-24 DIAGNOSIS — C79.9 SECONDARY MALIGNANT NEOPLASM OF UNSPECIFIED SITE: ICD-10-CM

## 2019-10-24 DIAGNOSIS — S22.089A UNSPECIFIED FRACTURE OF T11-T12 VERTEBRA, INITIAL ENCOUNTER FOR CLOSED FRACTURE: ICD-10-CM

## 2019-10-24 DIAGNOSIS — C79.51 SECONDARY MALIGNANT NEOPLASM OF BONE: ICD-10-CM

## 2019-10-24 DIAGNOSIS — G89.3 NEOPLASM RELATED PAIN (ACUTE) (CHRONIC): ICD-10-CM

## 2019-10-24 PROCEDURE — 72146 MRI CHEST SPINE W/O DYE: CPT | Mod: 26

## 2019-10-24 PROCEDURE — 72141 MRI NECK SPINE W/O DYE: CPT | Mod: 26

## 2019-10-24 PROCEDURE — 72148 MRI LUMBAR SPINE W/O DYE: CPT | Mod: 26

## 2019-10-24 PROCEDURE — 72146 MRI CHEST SPINE W/O DYE: CPT

## 2019-10-24 PROCEDURE — 72141 MRI NECK SPINE W/O DYE: CPT

## 2019-10-24 PROCEDURE — 72148 MRI LUMBAR SPINE W/O DYE: CPT

## 2019-11-07 ENCOUNTER — MEDICATION RENEWAL (OUTPATIENT)
Age: 59
End: 2019-11-07

## 2019-12-12 ENCOUNTER — NON-APPOINTMENT (OUTPATIENT)
Age: 59
End: 2019-12-12

## 2019-12-12 ENCOUNTER — APPOINTMENT (OUTPATIENT)
Dept: INTERNAL MEDICINE | Facility: CLINIC | Age: 59
End: 2019-12-12
Payer: COMMERCIAL

## 2019-12-12 VITALS
WEIGHT: 209 LBS | RESPIRATION RATE: 12 BRPM | DIASTOLIC BLOOD PRESSURE: 90 MMHG | HEIGHT: 73 IN | SYSTOLIC BLOOD PRESSURE: 140 MMHG | BODY MASS INDEX: 27.7 KG/M2 | HEART RATE: 70 BPM

## 2019-12-12 DIAGNOSIS — Z23 ENCOUNTER FOR IMMUNIZATION: ICD-10-CM

## 2019-12-12 PROCEDURE — 90732 PPSV23 VACC 2 YRS+ SUBQ/IM: CPT

## 2019-12-12 PROCEDURE — 99396 PREV VISIT EST AGE 40-64: CPT | Mod: 25

## 2019-12-12 PROCEDURE — 93000 ELECTROCARDIOGRAM COMPLETE: CPT

## 2019-12-12 PROCEDURE — 36415 COLL VENOUS BLD VENIPUNCTURE: CPT

## 2019-12-12 PROCEDURE — G0009: CPT

## 2019-12-12 PROCEDURE — 90472 IMMUNIZATION ADMIN EACH ADD: CPT

## 2019-12-12 PROCEDURE — 90688 IIV4 VACCINE SPLT 0.5 ML IM: CPT

## 2019-12-12 RX ORDER — PREDNISONE 20 MG/1
20 TABLET ORAL DAILY
Qty: 5 | Refills: 0 | Status: DISCONTINUED | COMMUNITY
Start: 2018-10-30 | End: 2019-12-12

## 2019-12-12 RX ORDER — PREDNISONE 20 MG/1
20 TABLET ORAL
Refills: 0 | Status: DISCONTINUED | COMMUNITY
End: 2019-12-12

## 2019-12-12 NOTE — HEALTH RISK ASSESSMENT
[Excellent] : ~his/her~  mood as  excellent [No] : No [No falls in past year] : Patient reported no falls in the past year [0] : 2) Feeling down, depressed, or hopeless: Not at all (0) [HIV test declined] : HIV test declined [Hepatitis C test declined] : Hepatitis C test declined [None] : None [With Significant Other] : lives with significant other [High School] : high school [] :  [Sexually Active] : sexually active [Feels Safe at Home] : Feels safe at home [Fully functional (bathing, dressing, toileting, transferring, walking, feeding)] : Fully functional (bathing, dressing, toileting, transferring, walking, feeding) [Fully functional (using the telephone, shopping, preparing meals, housekeeping, doing laundry, using] : Fully functional and needs no help or supervision to perform IADLs (using the telephone, shopping, preparing meals, housekeeping, doing laundry, using transportation, managing medications and managing finances) [Smoke Detector] : smoke detector [Safety elements used in home] : safety elements used in home [Seat Belt] :  uses seat belt [] : No [Change in mental status noted] : No change in mental status noted [Language] : denies difficulty with language [Behavior] : denies difficulty with behavior [Learning/Retaining New Information] : denies difficulty learning/retaining new information [Handling Complex Tasks] : denies difficulty handling complex tasks [Reasoning] : denies difficulty with reasoning [Spatial Ability and Orientation] : denies difficulty with spatial ability and orientation [Reports changes in hearing] : Reports no changes in hearing [High Risk Behavior] : no high risk behavior [Reports changes in vision] : Reports no changes in vision [Reports normal functional visual acuity (ie: able to read med bottle)] : Reports poor functional visual acuity.  [Reports changes in dental health] : Reports no changes in dental health [Carbon Monoxide Detector] : no carbon monoxide detector [Guns at Home] : no guns at home [Travel to Developing Areas] : does not  travel to developing areas [Sunscreen] : does not use sunscreen [AdvancecareDate] : 12/12/19

## 2019-12-12 NOTE — PHYSICAL EXAM
[No Acute Distress] : no acute distress [Well Nourished] : well nourished [Well Developed] : well developed [Well-Appearing] : well-appearing [Normal Sclera/Conjunctiva] : normal sclera/conjunctiva [PERRL] : pupils equal round and reactive to light [EOMI] : extraocular movements intact [Normal Outer Ear/Nose] : the outer ears and nose were normal in appearance [Normal Oropharynx] : the oropharynx was normal [No Lymphadenopathy] : no lymphadenopathy [No JVD] : no jugular venous distention [Supple] : supple [No Accessory Muscle Use] : no accessory muscle use [No Respiratory Distress] : no respiratory distress  [Thyroid Normal, No Nodules] : the thyroid was normal and there were no nodules present [Clear to Auscultation] : lungs were clear to auscultation bilaterally [Normal Rate] : normal rate  [Regular Rhythm] : with a regular rhythm [Normal S1, S2] : normal S1 and S2 [No Murmur] : no murmur heard [No Carotid Bruits] : no carotid bruits [No Abdominal Bruit] : a ~M bruit was not heard ~T in the abdomen [No Varicosities] : no varicosities [Pedal Pulses Present] : the pedal pulses are present [No Edema] : there was no peripheral edema [No Palpable Aorta] : no palpable aorta [No Extremity Clubbing/Cyanosis] : no extremity clubbing/cyanosis [Non Tender] : non-tender [Soft] : abdomen soft [No Masses] : no abdominal mass palpated [Non-distended] : non-distended [No HSM] : no HSM [Normal Bowel Sounds] : normal bowel sounds [Normal Posterior Cervical Nodes] : no posterior cervical lymphadenopathy [Normal Anterior Cervical Nodes] : no anterior cervical lymphadenopathy [No CVA Tenderness] : no CVA  tenderness [No Spinal Tenderness] : no spinal tenderness [No Joint Swelling] : no joint swelling [Grossly Normal Strength/Tone] : grossly normal strength/tone [No Rash] : no rash [Coordination Grossly Intact] : coordination grossly intact [No Focal Deficits] : no focal deficits [Normal Gait] : normal gait [Deep Tendon Reflexes (DTR)] : deep tendon reflexes were 2+ and symmetric [Normal Affect] : the affect was normal [Normal Insight/Judgement] : insight and judgment were intact

## 2019-12-12 NOTE — ASSESSMENT
[FreeTextEntry1] : patient is medically stable\par flu and pnuemovax\par continue treatment\par norvasc 2.5 mg poqd\par prostate cancer stable\par has mets to spine follow up with spine\par follow up with heme onc

## 2019-12-12 NOTE — HISTORY OF PRESENT ILLNESS
[FreeTextEntry1] : For CPE [de-identified] : For CPE\par has met prostate cancer\par patient has been doing will with treatment

## 2019-12-13 LAB
25(OH)D3 SERPL-MCNC: 27.2 NG/ML
ALBUMIN SERPL ELPH-MCNC: 4.6 G/DL
ALP BLD-CCNC: 90 U/L
ALT SERPL-CCNC: 18 U/L
ANION GAP SERPL CALC-SCNC: 13 MMOL/L
APPEARANCE: CLEAR
AST SERPL-CCNC: 19 U/L
BASOPHILS # BLD AUTO: 0.03 K/UL
BASOPHILS NFR BLD AUTO: 0.5 %
BILIRUB SERPL-MCNC: 0.2 MG/DL
BILIRUBIN URINE: NEGATIVE
BLOOD URINE: NEGATIVE
BUN SERPL-MCNC: 18 MG/DL
CALCIUM SERPL-MCNC: 10.1 MG/DL
CHLORIDE SERPL-SCNC: 104 MMOL/L
CHOLEST SERPL-MCNC: 263 MG/DL
CHOLEST/HDLC SERPL: 4.1 RATIO
CO2 SERPL-SCNC: 27 MMOL/L
COLOR: YELLOW
CREAT SERPL-MCNC: 1.48 MG/DL
CREAT SPEC-SCNC: 201 MG/DL
EOSINOPHIL # BLD AUTO: 0.09 K/UL
EOSINOPHIL NFR BLD AUTO: 1.6 %
ESTIMATED AVERAGE GLUCOSE: 126 MG/DL
GLUCOSE QUALITATIVE U: NEGATIVE
GLUCOSE SERPL-MCNC: 94 MG/DL
HBA1C MFR BLD HPLC: 6 %
HCT VFR BLD CALC: 40.7 %
HCV AB SER QL: NONREACTIVE
HCV S/CO RATIO: 0.06 S/CO
HDLC SERPL-MCNC: 64 MG/DL
HGB BLD-MCNC: 12.6 G/DL
IMM GRANULOCYTES NFR BLD AUTO: 1.6 %
KETONES URINE: NEGATIVE
LDLC SERPL CALC-MCNC: 177 MG/DL
LEUKOCYTE ESTERASE URINE: NEGATIVE
LYMPHOCYTES # BLD AUTO: 0.87 K/UL
LYMPHOCYTES NFR BLD AUTO: 15.7 %
MAN DIFF?: NORMAL
MCHC RBC-ENTMCNC: 27.8 PG
MCHC RBC-ENTMCNC: 31 GM/DL
MCV RBC AUTO: 89.6 FL
MICROALBUMIN 24H UR DL<=1MG/L-MCNC: <1.2 MG/DL
MICROALBUMIN/CREAT 24H UR-RTO: NORMAL MG/G
MONOCYTES # BLD AUTO: 0.71 K/UL
MONOCYTES NFR BLD AUTO: 12.8 %
NEUTROPHILS # BLD AUTO: 3.76 K/UL
NEUTROPHILS NFR BLD AUTO: 67.8 %
NITRITE URINE: NEGATIVE
PH URINE: 5
PLATELET # BLD AUTO: 252 K/UL
POTASSIUM SERPL-SCNC: 4 MMOL/L
PROT SERPL-MCNC: 6.9 G/DL
PROTEIN URINE: NEGATIVE
PSA SERPL-MCNC: 0.02 NG/ML
RBC # BLD: 4.54 M/UL
RBC # FLD: 14 %
SODIUM SERPL-SCNC: 144 MMOL/L
SPECIFIC GRAVITY URINE: 1.02
T4 FREE SERPL-MCNC: 1.4 NG/DL
TRIGL SERPL-MCNC: 111 MG/DL
TSH SERPL-ACNC: 1.04 UIU/ML
UROBILINOGEN URINE: NORMAL
WBC # FLD AUTO: 5.55 K/UL

## 2019-12-23 ENCOUNTER — RX RENEWAL (OUTPATIENT)
Age: 59
End: 2019-12-23

## 2019-12-28 ENCOUNTER — OUTPATIENT (OUTPATIENT)
Dept: OUTPATIENT SERVICES | Facility: HOSPITAL | Age: 59
LOS: 1 days | Discharge: HOME | End: 2019-12-28

## 2019-12-28 DIAGNOSIS — C61 MALIGNANT NEOPLASM OF PROSTATE: ICD-10-CM

## 2019-12-28 DIAGNOSIS — C79.51 SECONDARY MALIGNANT NEOPLASM OF BONE: ICD-10-CM

## 2019-12-31 ENCOUNTER — MEDICATION RENEWAL (OUTPATIENT)
Age: 59
End: 2019-12-31

## 2020-01-02 ENCOUNTER — APPOINTMENT (OUTPATIENT)
Age: 60
End: 2020-01-02

## 2020-01-02 ENCOUNTER — APPOINTMENT (OUTPATIENT)
Dept: HEMATOLOGY ONCOLOGY | Facility: CLINIC | Age: 60
End: 2020-01-02

## 2020-01-03 NOTE — ED PROVIDER NOTE - NS_EDPROVIDERDISPOUSERTYPE_ED_A_ED
Two patient identifiers confirmed (name and ). Patient notified of lab results of 12/3/19 per Dr. Mitul Germain notes. Patient notified podiatry referral has been placed. Patient verbalized understanding. Attending Attestation (For Attendings USE Only)...

## 2020-01-06 ENCOUNTER — APPOINTMENT (OUTPATIENT)
Dept: HEMATOLOGY ONCOLOGY | Facility: CLINIC | Age: 60
End: 2020-01-06
Payer: COMMERCIAL

## 2020-01-06 ENCOUNTER — APPOINTMENT (OUTPATIENT)
Age: 60
End: 2020-01-06

## 2020-01-06 VITALS
BODY MASS INDEX: 28.76 KG/M2 | HEART RATE: 73 BPM | TEMPERATURE: 97.9 F | OXYGEN SATURATION: 94 % | SYSTOLIC BLOOD PRESSURE: 153 MMHG | WEIGHT: 217.03 LBS | DIASTOLIC BLOOD PRESSURE: 83 MMHG | HEIGHT: 73 IN

## 2020-01-06 PROCEDURE — 99214 OFFICE O/P EST MOD 30 MIN: CPT

## 2020-01-06 NOTE — HISTORY OF PRESENT ILLNESS
[AJCC Stage: ____] : AJCC Stage: [unfilled] [T: ___] : T[unfilled] [N: ___] : N[unfilled] [M: ___] : M[unfilled] [de-identified] : Mr. Colt Cedeno is a 59 year old male who presents for a follow up visit for severe advanced prostate cancer with painful bone mets. He was diagnosed with prostrate cancer by urologist Dr. Dylon Franklin. He was admitted to the emergency room on 12/6/18 for increasing lower back pain and difficulty ambulating, with significant improvements from fentanyl (patch). He is s/p palliative radiation therapy to spine. Began Lupron and Casodex on recent [facility] admission, and was started on ketoconazole by Urology. He has since transitioned to Lupron/Zytiga/Prednisone hormonal therapy. [de-identified] : Feeling well today.\par Tolerating Lupron ( now Q 3 months ), Xgeva, Zytiga, and Prednisone. \par Energy level is good. \par Pain is constant around a 3/10. \par Has been experiencing less tolerance to cold weather. \par Due to recent job complications, planning on moving out of [facility] to [place], around February.

## 2020-01-06 NOTE — ASSESSMENT
[FreeTextEntry1] : 59 y/o male with prostate cancer metastatic to bone. \par \par PSA 12/12/19: 0.2 ng/mL \par \par Plan:\par -Continue Lupron/Casodex, Xgeva, and Zytiga/Prednisone\par -Follow up in 3 months \par

## 2020-02-03 ENCOUNTER — APPOINTMENT (OUTPATIENT)
Dept: GASTROENTEROLOGY | Facility: CLINIC | Age: 60
End: 2020-02-03

## 2020-02-28 ENCOUNTER — RX RENEWAL (OUTPATIENT)
Age: 60
End: 2020-02-28

## 2020-03-20 ENCOUNTER — APPOINTMENT (OUTPATIENT)
Dept: GASTROENTEROLOGY | Facility: CLINIC | Age: 60
End: 2020-03-20

## 2020-03-26 ENCOUNTER — OUTPATIENT (OUTPATIENT)
Dept: OUTPATIENT SERVICES | Facility: HOSPITAL | Age: 60
LOS: 1 days | Discharge: HOME | End: 2020-03-26

## 2020-03-26 DIAGNOSIS — C61 MALIGNANT NEOPLASM OF PROSTATE: ICD-10-CM

## 2020-04-02 ENCOUNTER — RESULT REVIEW (OUTPATIENT)
Age: 60
End: 2020-04-02

## 2020-04-02 ENCOUNTER — APPOINTMENT (OUTPATIENT)
Age: 60
End: 2020-04-02

## 2020-04-02 LAB
BASOPHILS # BLD AUTO: 0.1 K/UL — SIGNIFICANT CHANGE UP (ref 0–0.2)
BASOPHILS NFR BLD AUTO: 1.8 % — SIGNIFICANT CHANGE UP (ref 0–2)
BUN SERPL-MCNC: 11 MG/DL — SIGNIFICANT CHANGE UP (ref 7–23)
CA-I BLDA-SCNC: 1.19 MMOL/L — SIGNIFICANT CHANGE UP (ref 1.12–1.3)
CHLORIDE SERPL-SCNC: 104 MMOL/L — SIGNIFICANT CHANGE UP (ref 96–108)
CO2 SERPL-SCNC: 27 MMOL/L — SIGNIFICANT CHANGE UP (ref 22–31)
CREAT SERPL-MCNC: 1.2 MG/DL — SIGNIFICANT CHANGE UP (ref 0.5–1.3)
EOSINOPHIL # BLD AUTO: 0.1 K/UL — SIGNIFICANT CHANGE UP (ref 0–0.5)
EOSINOPHIL NFR BLD AUTO: 1.7 % — SIGNIFICANT CHANGE UP (ref 0–6)
GLUCOSE SERPL-MCNC: 77 MG/DL — SIGNIFICANT CHANGE UP (ref 70–99)
HCT VFR BLD CALC: 40.9 % — SIGNIFICANT CHANGE UP (ref 39–50)
HGB BLD-MCNC: 12.6 G/DL — LOW (ref 13–17)
LYMPHOCYTES # BLD AUTO: 1.1 K/UL — SIGNIFICANT CHANGE UP (ref 1–3.3)
LYMPHOCYTES # BLD AUTO: 16.7 % — SIGNIFICANT CHANGE UP (ref 13–44)
MCHC RBC-ENTMCNC: 26.9 PG — LOW (ref 27–34)
MCHC RBC-ENTMCNC: 30.9 G/DL — LOW (ref 32–36)
MCV RBC AUTO: 86.9 FL — SIGNIFICANT CHANGE UP (ref 80–100)
MONOCYTES # BLD AUTO: 0.8 K/UL — SIGNIFICANT CHANGE UP (ref 0–0.9)
MONOCYTES NFR BLD AUTO: 12 % — SIGNIFICANT CHANGE UP (ref 2–14)
NEUTROPHILS # BLD AUTO: 4.4 K/UL — SIGNIFICANT CHANGE UP (ref 1.8–7.4)
NEUTROPHILS NFR BLD AUTO: 67.8 % — SIGNIFICANT CHANGE UP (ref 43–77)
PLATELET # BLD AUTO: 225 K/UL — SIGNIFICANT CHANGE UP (ref 150–400)
POTASSIUM SERPL-MCNC: 3.7 MMOL/L — SIGNIFICANT CHANGE UP (ref 3.5–5.3)
POTASSIUM SERPL-SCNC: 3.7 MMOL/L — SIGNIFICANT CHANGE UP (ref 3.5–5.3)
RBC # BLD: 4.7 M/UL — SIGNIFICANT CHANGE UP (ref 4.2–5.8)
RBC # FLD: 13.3 % — SIGNIFICANT CHANGE UP (ref 10.3–14.5)
SODIUM SERPL-SCNC: 143 MMOL/L — SIGNIFICANT CHANGE UP (ref 135–145)
WBC # BLD: 6.4 K/UL — SIGNIFICANT CHANGE UP (ref 3.8–10.5)
WBC # FLD AUTO: 6.4 K/UL — SIGNIFICANT CHANGE UP (ref 3.8–10.5)

## 2020-04-03 DIAGNOSIS — C79.51 SECONDARY MALIGNANT NEOPLASM OF BONE: ICD-10-CM

## 2020-04-11 DIAGNOSIS — R11.0 NAUSEA: ICD-10-CM

## 2020-04-23 ENCOUNTER — APPOINTMENT (OUTPATIENT)
Dept: GASTROENTEROLOGY | Facility: CLINIC | Age: 60
End: 2020-04-23
Payer: COMMERCIAL

## 2020-04-23 DIAGNOSIS — Z12.11 ENCOUNTER FOR SCREENING FOR MALIGNANT NEOPLASM OF COLON: ICD-10-CM

## 2020-04-23 PROCEDURE — 99442: CPT

## 2020-05-11 ENCOUNTER — RX RENEWAL (OUTPATIENT)
Age: 60
End: 2020-05-11

## 2020-06-09 ENCOUNTER — APPOINTMENT (OUTPATIENT)
Dept: ORTHOPEDIC SURGERY | Facility: CLINIC | Age: 60
End: 2020-06-09

## 2020-06-16 ENCOUNTER — APPOINTMENT (OUTPATIENT)
Dept: UROLOGY | Facility: CLINIC | Age: 60
End: 2020-06-16
Payer: COMMERCIAL

## 2020-06-16 VITALS — HEART RATE: 86 BPM | DIASTOLIC BLOOD PRESSURE: 94 MMHG | SYSTOLIC BLOOD PRESSURE: 178 MMHG

## 2020-06-16 VITALS — TEMPERATURE: 97.5 F

## 2020-06-16 PROCEDURE — 99213 OFFICE O/P EST LOW 20 MIN: CPT

## 2020-06-16 RX ORDER — SODIUM SULFATE, POTASSIUM SULFATE, MAGNESIUM SULFATE 17.5; 3.13; 1.6 G/ML; G/ML; G/ML
17.5-3.13-1.6 SOLUTION, CONCENTRATE ORAL
Qty: 1 | Refills: 0 | Status: DISCONTINUED | COMMUNITY
Start: 2020-04-23 | End: 2020-06-16

## 2020-06-16 RX ORDER — OXYCODONE 10 MG/1
10 TABLET ORAL DAILY
Qty: 30 | Refills: 0 | Status: DISCONTINUED | COMMUNITY
Start: 2018-11-30 | End: 2020-06-16

## 2020-06-16 RX ORDER — ABIRATERONE ACETATE 250 MG/1
250 TABLET, FILM COATED ORAL DAILY
Qty: 120 | Refills: 0 | Status: DISCONTINUED | COMMUNITY
Start: 2018-12-28 | End: 2020-06-16

## 2020-06-16 RX ORDER — FENTANYL 50 UG/H
50 PATCH, EXTENDED RELEASE TRANSDERMAL
Qty: 10 | Refills: 0 | Status: DISCONTINUED | COMMUNITY
Start: 2018-12-03 | End: 2020-06-16

## 2020-06-17 LAB
PSA SERPL-MCNC: <0.01 NG/ML
TESTOST SERPL-MCNC: <2.5 NG/DL

## 2020-06-26 ENCOUNTER — OUTPATIENT (OUTPATIENT)
Dept: OUTPATIENT SERVICES | Facility: HOSPITAL | Age: 60
LOS: 1 days | Discharge: HOME | End: 2020-06-26

## 2020-06-26 DIAGNOSIS — C61 MALIGNANT NEOPLASM OF PROSTATE: ICD-10-CM

## 2020-07-02 ENCOUNTER — APPOINTMENT (OUTPATIENT)
Age: 60
End: 2020-07-02

## 2020-07-07 ENCOUNTER — APPOINTMENT (OUTPATIENT)
Dept: HEMATOLOGY ONCOLOGY | Facility: CLINIC | Age: 60
End: 2020-07-07

## 2020-07-08 ENCOUNTER — APPOINTMENT (OUTPATIENT)
Dept: HEMATOLOGY ONCOLOGY | Facility: CLINIC | Age: 60
End: 2020-07-08
Payer: COMMERCIAL

## 2020-07-08 ENCOUNTER — LABORATORY RESULT (OUTPATIENT)
Age: 60
End: 2020-07-08

## 2020-07-08 PROCEDURE — 99499A: CUSTOM | Mod: NC

## 2020-07-10 ENCOUNTER — RESULT REVIEW (OUTPATIENT)
Age: 60
End: 2020-07-10

## 2020-07-10 ENCOUNTER — APPOINTMENT (OUTPATIENT)
Age: 60
End: 2020-07-10

## 2020-07-10 LAB
BASOPHILS # BLD AUTO: 0 K/UL — SIGNIFICANT CHANGE UP (ref 0–0.2)
BASOPHILS NFR BLD AUTO: 0.7 % — SIGNIFICANT CHANGE UP (ref 0–2)
BUN SERPL-MCNC: 13 MG/DL — SIGNIFICANT CHANGE UP (ref 7–23)
CA-I BLDA-SCNC: 1.21 MMOL/L — SIGNIFICANT CHANGE UP (ref 1.12–1.3)
CHLORIDE SERPL-SCNC: 104 MMOL/L — SIGNIFICANT CHANGE UP (ref 96–108)
CO2 SERPL-SCNC: 26 MMOL/L — SIGNIFICANT CHANGE UP (ref 22–31)
CREAT SERPL-MCNC: 1.4 MG/DL — HIGH (ref 0.5–1.3)
EOSINOPHIL # BLD AUTO: 0.1 K/UL — SIGNIFICANT CHANGE UP (ref 0–0.5)
EOSINOPHIL NFR BLD AUTO: 1.9 % — SIGNIFICANT CHANGE UP (ref 0–6)
GLUCOSE SERPL-MCNC: 101 MG/DL — HIGH (ref 70–99)
HCT VFR BLD CALC: 39.6 % — SIGNIFICANT CHANGE UP (ref 39–50)
HGB BLD-MCNC: 13 G/DL — SIGNIFICANT CHANGE UP (ref 13–17)
LYMPHOCYTES # BLD AUTO: 1.3 K/UL — SIGNIFICANT CHANGE UP (ref 1–3.3)
LYMPHOCYTES # BLD AUTO: 23.2 % — SIGNIFICANT CHANGE UP (ref 13–44)
MCHC RBC-ENTMCNC: 27.6 PG — SIGNIFICANT CHANGE UP (ref 27–34)
MCHC RBC-ENTMCNC: 32.7 G/DL — SIGNIFICANT CHANGE UP (ref 32–36)
MCV RBC AUTO: 84.4 FL — SIGNIFICANT CHANGE UP (ref 80–100)
MONOCYTES # BLD AUTO: 0.7 K/UL — SIGNIFICANT CHANGE UP (ref 0–0.9)
MONOCYTES NFR BLD AUTO: 12.1 % — SIGNIFICANT CHANGE UP (ref 2–14)
NEUTROPHILS # BLD AUTO: 3.6 K/UL — SIGNIFICANT CHANGE UP (ref 1.8–7.4)
NEUTROPHILS NFR BLD AUTO: 62.1 % — SIGNIFICANT CHANGE UP (ref 43–77)
PLATELET # BLD AUTO: 232 K/UL — SIGNIFICANT CHANGE UP (ref 150–400)
POTASSIUM SERPL-MCNC: 3.7 MMOL/L — SIGNIFICANT CHANGE UP (ref 3.5–5.3)
POTASSIUM SERPL-SCNC: 3.7 MMOL/L — SIGNIFICANT CHANGE UP (ref 3.5–5.3)
RBC # BLD: 4.7 M/UL — SIGNIFICANT CHANGE UP (ref 4.2–5.8)
RBC # FLD: 14 % — SIGNIFICANT CHANGE UP (ref 10.3–14.5)
SODIUM SERPL-SCNC: 143 MMOL/L — SIGNIFICANT CHANGE UP (ref 135–145)
WBC # BLD: 5.8 K/UL — SIGNIFICANT CHANGE UP (ref 3.8–10.5)
WBC # FLD AUTO: 5.8 K/UL — SIGNIFICANT CHANGE UP (ref 3.8–10.5)

## 2020-07-13 DIAGNOSIS — C79.51 SECONDARY MALIGNANT NEOPLASM OF BONE: ICD-10-CM

## 2020-07-28 ENCOUNTER — RX RENEWAL (OUTPATIENT)
Age: 60
End: 2020-07-28

## 2020-09-10 ENCOUNTER — RX RENEWAL (OUTPATIENT)
Age: 60
End: 2020-09-10

## 2020-10-05 ENCOUNTER — RX RENEWAL (OUTPATIENT)
Age: 60
End: 2020-10-05

## 2020-10-08 ENCOUNTER — OUTPATIENT (OUTPATIENT)
Dept: OUTPATIENT SERVICES | Facility: HOSPITAL | Age: 60
LOS: 1 days | Discharge: HOME | End: 2020-10-08

## 2020-10-08 DIAGNOSIS — C79.51 SECONDARY MALIGNANT NEOPLASM OF BONE: ICD-10-CM

## 2020-10-09 ENCOUNTER — APPOINTMENT (OUTPATIENT)
Age: 60
End: 2020-10-09

## 2020-10-09 ENCOUNTER — RESULT REVIEW (OUTPATIENT)
Age: 60
End: 2020-10-09

## 2020-10-09 ENCOUNTER — APPOINTMENT (OUTPATIENT)
Dept: HEMATOLOGY ONCOLOGY | Facility: CLINIC | Age: 60
End: 2020-10-09
Payer: COMMERCIAL

## 2020-10-09 VITALS
OXYGEN SATURATION: 96 % | BODY MASS INDEX: 28.76 KG/M2 | HEART RATE: 74 BPM | WEIGHT: 217.03 LBS | HEIGHT: 73 IN | DIASTOLIC BLOOD PRESSURE: 87 MMHG | SYSTOLIC BLOOD PRESSURE: 146 MMHG

## 2020-10-09 LAB
BASOPHILS # BLD AUTO: 0.1 K/UL — SIGNIFICANT CHANGE UP (ref 0–0.2)
BASOPHILS NFR BLD AUTO: 0.9 % — SIGNIFICANT CHANGE UP (ref 0–2)
BUN SERPL-MCNC: 12 MG/DL — SIGNIFICANT CHANGE UP (ref 7–23)
CA-I BLDA-SCNC: 1.22 MMOL/L — SIGNIFICANT CHANGE UP (ref 1.12–1.3)
CHLORIDE SERPL-SCNC: 106 MMOL/L — SIGNIFICANT CHANGE UP (ref 96–108)
CO2 SERPL-SCNC: 27 MMOL/L — SIGNIFICANT CHANGE UP (ref 22–31)
CREAT SERPL-MCNC: 1.2 MG/DL — SIGNIFICANT CHANGE UP (ref 0.5–1.3)
EOSINOPHIL # BLD AUTO: 0.1 K/UL — SIGNIFICANT CHANGE UP (ref 0–0.5)
EOSINOPHIL NFR BLD AUTO: 1.5 % — SIGNIFICANT CHANGE UP (ref 0–6)
GLUCOSE SERPL-MCNC: 93 MG/DL — SIGNIFICANT CHANGE UP (ref 70–99)
HCT VFR BLD CALC: 41.5 % — SIGNIFICANT CHANGE UP (ref 39–50)
HGB BLD-MCNC: 12.7 G/DL — LOW (ref 13–17)
LYMPHOCYTES # BLD AUTO: 1.5 K/UL — SIGNIFICANT CHANGE UP (ref 1–3.3)
LYMPHOCYTES # BLD AUTO: 21.7 % — SIGNIFICANT CHANGE UP (ref 13–44)
MCHC RBC-ENTMCNC: 27 PG — SIGNIFICANT CHANGE UP (ref 27–34)
MCHC RBC-ENTMCNC: 30.6 G/DL — LOW (ref 32–36)
MCV RBC AUTO: 88 FL — SIGNIFICANT CHANGE UP (ref 80–100)
MONOCYTES # BLD AUTO: 0.7 K/UL — SIGNIFICANT CHANGE UP (ref 0–0.9)
MONOCYTES NFR BLD AUTO: 10.5 % — SIGNIFICANT CHANGE UP (ref 2–14)
NEUTROPHILS # BLD AUTO: 4.4 K/UL — SIGNIFICANT CHANGE UP (ref 1.8–7.4)
NEUTROPHILS NFR BLD AUTO: 65.5 % — SIGNIFICANT CHANGE UP (ref 43–77)
PLATELET # BLD AUTO: 242 K/UL — SIGNIFICANT CHANGE UP (ref 150–400)
POTASSIUM SERPL-MCNC: 4.3 MMOL/L — SIGNIFICANT CHANGE UP (ref 3.5–5.3)
POTASSIUM SERPL-SCNC: 4.3 MMOL/L — SIGNIFICANT CHANGE UP (ref 3.5–5.3)
RBC # BLD: 4.71 M/UL — SIGNIFICANT CHANGE UP (ref 4.2–5.8)
RBC # FLD: 13.1 % — SIGNIFICANT CHANGE UP (ref 10.3–14.5)
SODIUM SERPL-SCNC: 143 MMOL/L — SIGNIFICANT CHANGE UP (ref 135–145)
WBC # BLD: 6.7 K/UL — SIGNIFICANT CHANGE UP (ref 3.8–10.5)
WBC # FLD AUTO: 6.7 K/UL — SIGNIFICANT CHANGE UP (ref 3.8–10.5)

## 2020-10-09 PROCEDURE — 99213 OFFICE O/P EST LOW 20 MIN: CPT

## 2020-10-09 NOTE — ASSESSMENT
[FreeTextEntry1] : 59 y/o male with prostate cancer metastatic to bone. \par \par PSA 06/16/2020: <0.01 ng/mL \par \par Plan:\par -Continue Lupron/Casodex, Xgeva, and Zytiga/Prednisone\par -Ordered PSA/Istat\par -Ordered bone scan\par -Follow up in 3-6 months \par

## 2020-10-09 NOTE — HISTORY OF PRESENT ILLNESS
[T: ___] : T[unfilled] [N: ___] : N[unfilled] [M: ___] : M[unfilled] [AJCC Stage: ____] : AJCC Stage: [unfilled] [de-identified] : Mr. Colt Cedeno is a 60 year old male who presents for a follow up visit for severe advanced prostate cancer with painful bone mets. He was diagnosed with prostate cancer by urologist Dr. Dylon Franklin. He was admitted to the emergency room on 12/6/18 for increasing lower back pain and difficulty ambulating, with significant improvements from fentanyl (patch). He is s/p palliative radiation therapy to spine. Began Lupron and Casodex on recent [facility] admission, and was started on ketoconazole by Urology. He has since transitioned to Lupron/Zytiga/Prednisone hormonal therapy. [de-identified] : Patient doing well, has no acute complaints. Patient tolerating Lupron, Xgeva, Zytiga, and Prednisone with minimal side effects.States energy level and appetite are good. Admits mild low back pain at end of day but resolves next day.  Admits daily hot flashes. Patient admits dripping after voiding, has been evaluated by Urology. Denies dysuria, hematuria. Denies fever/chills, fatigue,nausea, vomiting, diarrhea,constipation,  abdominal pain, bleeding, easy bruising or visual changes, chest pain,  SOB or LEAL,  LE edema.\par

## 2020-10-10 LAB — PSA SERPL-MCNC: <0.01 NG/ML

## 2020-10-12 DIAGNOSIS — C61 MALIGNANT NEOPLASM OF PROSTATE: ICD-10-CM

## 2020-11-04 ENCOUNTER — APPOINTMENT (OUTPATIENT)
Dept: NUCLEAR MEDICINE | Facility: CLINIC | Age: 60
End: 2020-11-04
Payer: COMMERCIAL

## 2020-11-04 ENCOUNTER — OUTPATIENT (OUTPATIENT)
Dept: OUTPATIENT SERVICES | Facility: HOSPITAL | Age: 60
LOS: 1 days | End: 2020-11-04

## 2020-11-04 DIAGNOSIS — C79.51 SECONDARY MALIGNANT NEOPLASM OF BONE: ICD-10-CM

## 2020-11-04 PROCEDURE — 78306 BONE IMAGING WHOLE BODY: CPT | Mod: 26

## 2020-11-06 ENCOUNTER — APPOINTMENT (OUTPATIENT)
Age: 60
End: 2020-11-06

## 2020-11-07 ENCOUNTER — OUTPATIENT (OUTPATIENT)
Dept: OUTPATIENT SERVICES | Facility: HOSPITAL | Age: 60
LOS: 1 days | Discharge: HOME | End: 2020-11-07

## 2020-11-07 DIAGNOSIS — C79.51 SECONDARY MALIGNANT NEOPLASM OF BONE: ICD-10-CM

## 2020-11-07 DIAGNOSIS — C61 MALIGNANT NEOPLASM OF PROSTATE: ICD-10-CM

## 2020-11-09 ENCOUNTER — RESULT REVIEW (OUTPATIENT)
Age: 60
End: 2020-11-09

## 2020-11-09 ENCOUNTER — APPOINTMENT (OUTPATIENT)
Age: 60
End: 2020-11-09

## 2020-11-09 LAB
BASOPHILS # BLD AUTO: 0 K/UL — SIGNIFICANT CHANGE UP (ref 0–0.2)
BASOPHILS # BLD AUTO: 0.04 K/UL — SIGNIFICANT CHANGE UP (ref 0–0.2)
BASOPHILS NFR BLD AUTO: 0.43 % — SIGNIFICANT CHANGE UP (ref 0–2)
BASOPHILS NFR BLD AUTO: 0.6 % — SIGNIFICANT CHANGE UP (ref 0–2)
BUN SERPL-MCNC: 12 MG/DL — SIGNIFICANT CHANGE UP (ref 7–23)
CA-I BLDA-SCNC: 1.21 MMOL/L — SIGNIFICANT CHANGE UP (ref 1.12–1.3)
CHLORIDE SERPL-SCNC: 107 MMOL/L — SIGNIFICANT CHANGE UP (ref 96–108)
CO2 SERPL-SCNC: 26 MMOL/L — SIGNIFICANT CHANGE UP (ref 22–31)
CREAT SERPL-MCNC: 1.3 MG/DL — SIGNIFICANT CHANGE UP (ref 0.5–1.3)
EOSINOPHIL # BLD AUTO: 0 K/UL — SIGNIFICANT CHANGE UP (ref 0–0.5)
EOSINOPHIL # BLD AUTO: 0.02 K/UL — SIGNIFICANT CHANGE UP (ref 0–0.5)
EOSINOPHIL NFR BLD AUTO: 0.18 % — SIGNIFICANT CHANGE UP (ref 0–6)
EOSINOPHIL NFR BLD AUTO: 0.3 % — SIGNIFICANT CHANGE UP (ref 0–6)
GLUCOSE SERPL-MCNC: 138 MG/DL — HIGH (ref 70–99)
HCT VFR BLD CALC: 41.9 % — SIGNIFICANT CHANGE UP (ref 39–50)
HCT VFR BLD CALC: 42.6 % — SIGNIFICANT CHANGE UP (ref 39–50)
HGB BLD-MCNC: 13.1 G/DL — SIGNIFICANT CHANGE UP (ref 13–17)
HGB BLD-MCNC: 13.2 G/DL — SIGNIFICANT CHANGE UP (ref 13–17)
LYMPHOCYTES # BLD AUTO: 0.95 K/UL — SIGNIFICANT CHANGE UP (ref 1–3.3)
LYMPHOCYTES # BLD AUTO: 1 K/UL — SIGNIFICANT CHANGE UP (ref 1–3.3)
LYMPHOCYTES # BLD AUTO: 11 % — LOW (ref 13–44)
LYMPHOCYTES # BLD AUTO: 12.5 % — LOW (ref 13–44)
MCHC RBC-ENTMCNC: 27.7 PG — SIGNIFICANT CHANGE UP (ref 27–34)
MCHC RBC-ENTMCNC: 27.7 PG — SIGNIFICANT CHANGE UP (ref 27–34)
MCHC RBC-ENTMCNC: 30.9 G/DL — LOW (ref 32–36)
MCHC RBC-ENTMCNC: 31.2 G/DL — LOW (ref 32–36)
MCV RBC AUTO: 88.8 FL — SIGNIFICANT CHANGE UP (ref 80–100)
MCV RBC AUTO: 89.5 FL — SIGNIFICANT CHANGE UP (ref 80–100)
MONOCYTES # BLD AUTO: 0.47 K/UL — SIGNIFICANT CHANGE UP (ref 0–0.9)
MONOCYTES # BLD AUTO: 0.5 K/UL — SIGNIFICANT CHANGE UP (ref 0–0.9)
MONOCYTES NFR BLD AUTO: 5.45 % — SIGNIFICANT CHANGE UP (ref 2–14)
MONOCYTES NFR BLD AUTO: 5.8 % — SIGNIFICANT CHANGE UP (ref 2–14)
NEUTROPHILS # BLD AUTO: 6.7 K/UL — SIGNIFICANT CHANGE UP (ref 1.8–7.4)
NEUTROPHILS # BLD AUTO: 7.15 K/UL — SIGNIFICANT CHANGE UP (ref 1.8–7.4)
NEUTROPHILS NFR BLD AUTO: 80.8 % — HIGH (ref 43–77)
NEUTROPHILS NFR BLD AUTO: 82.9 % — HIGH (ref 43–77)
PLATELET # BLD AUTO: 244 K/UL — SIGNIFICANT CHANGE UP (ref 150–400)
PLATELET # BLD AUTO: 249 K/UL — SIGNIFICANT CHANGE UP (ref 150–400)
POTASSIUM SERPL-MCNC: 4.5 MMOL/L — SIGNIFICANT CHANGE UP (ref 3.5–5.3)
POTASSIUM SERPL-SCNC: 4.5 MMOL/L — SIGNIFICANT CHANGE UP (ref 3.5–5.3)
RBC # BLD: 4.72 M/UL — SIGNIFICANT CHANGE UP (ref 4.2–5.8)
RBC # BLD: 4.76 M/UL — SIGNIFICANT CHANGE UP (ref 4.2–5.8)
RBC # FLD: 13.5 % — SIGNIFICANT CHANGE UP (ref 10.3–14.5)
RBC # FLD: 13.9 % — SIGNIFICANT CHANGE UP (ref 10.3–14.5)
SODIUM SERPL-SCNC: 143 MMOL/L — SIGNIFICANT CHANGE UP (ref 135–145)
WBC # BLD: 8.3 K/UL — SIGNIFICANT CHANGE UP (ref 3.8–10.5)
WBC # BLD: 8.62 K/UL — SIGNIFICANT CHANGE UP (ref 3.8–10.5)
WBC # FLD AUTO: 8.3 K/UL — SIGNIFICANT CHANGE UP (ref 3.8–10.5)
WBC # FLD AUTO: 8.62 K/UL — SIGNIFICANT CHANGE UP (ref 3.8–10.5)

## 2020-11-10 ENCOUNTER — RX RENEWAL (OUTPATIENT)
Age: 60
End: 2020-11-10

## 2020-12-04 ENCOUNTER — APPOINTMENT (OUTPATIENT)
Age: 60
End: 2020-12-04

## 2020-12-23 PROBLEM — Z12.11 ENCOUNTER FOR SCREENING COLONOSCOPY: Status: RESOLVED | Noted: 2020-04-23 | Resolved: 2020-12-23

## 2021-01-11 ENCOUNTER — RX RENEWAL (OUTPATIENT)
Age: 61
End: 2021-01-11

## 2021-01-27 ENCOUNTER — APPOINTMENT (OUTPATIENT)
Dept: INTERNAL MEDICINE | Facility: CLINIC | Age: 61
End: 2021-01-27

## 2021-01-28 ENCOUNTER — NON-APPOINTMENT (OUTPATIENT)
Age: 61
End: 2021-01-28

## 2021-01-28 LAB
SARS-COV-2 IGG SERPL IA-ACNC: 48.4 INDEX
SARS-COV-2 IGG SERPL QL IA: POSITIVE

## 2021-02-12 ENCOUNTER — OUTPATIENT (OUTPATIENT)
Dept: OUTPATIENT SERVICES | Facility: HOSPITAL | Age: 61
LOS: 1 days | Discharge: HOME | End: 2021-02-12

## 2021-02-12 DIAGNOSIS — C61 MALIGNANT NEOPLASM OF PROSTATE: ICD-10-CM

## 2021-02-16 ENCOUNTER — RESULT REVIEW (OUTPATIENT)
Age: 61
End: 2021-02-16

## 2021-02-16 ENCOUNTER — APPOINTMENT (OUTPATIENT)
Age: 61
End: 2021-02-16

## 2021-02-16 LAB
BASOPHILS # BLD AUTO: 0.1 K/UL — SIGNIFICANT CHANGE UP (ref 0–0.2)
BASOPHILS NFR BLD AUTO: 0.7 % — SIGNIFICANT CHANGE UP (ref 0–2)
BUN SERPL-MCNC: 14 MG/DL — SIGNIFICANT CHANGE UP (ref 7–23)
CA-I BLDA-SCNC: 1.23 MMOL/L — SIGNIFICANT CHANGE UP (ref 1.12–1.3)
CHLORIDE SERPL-SCNC: 103 MMOL/L — SIGNIFICANT CHANGE UP (ref 96–108)
CO2 SERPL-SCNC: 27 MMOL/L — SIGNIFICANT CHANGE UP (ref 22–31)
CREAT SERPL-MCNC: 1.2 MG/DL — SIGNIFICANT CHANGE UP (ref 0.5–1.3)
EOSINOPHIL # BLD AUTO: 0.1 K/UL — SIGNIFICANT CHANGE UP (ref 0–0.5)
EOSINOPHIL NFR BLD AUTO: 1.3 % — SIGNIFICANT CHANGE UP (ref 0–6)
GLUCOSE SERPL-MCNC: 107 MG/DL — HIGH (ref 70–99)
HCT VFR BLD CALC: 42.4 % — SIGNIFICANT CHANGE UP (ref 39–50)
HGB BLD-MCNC: 12.8 G/DL — LOW (ref 13–17)
LYMPHOCYTES # BLD AUTO: 1.2 K/UL — SIGNIFICANT CHANGE UP (ref 1–3.3)
LYMPHOCYTES # BLD AUTO: 13.4 % — SIGNIFICANT CHANGE UP (ref 13–44)
MCHC RBC-ENTMCNC: 26.5 PG — LOW (ref 27–34)
MCHC RBC-ENTMCNC: 30.3 G/DL — LOW (ref 32–36)
MCV RBC AUTO: 87.6 FL — SIGNIFICANT CHANGE UP (ref 80–100)
MONOCYTES # BLD AUTO: 0.7 K/UL — SIGNIFICANT CHANGE UP (ref 0–0.9)
MONOCYTES NFR BLD AUTO: 7.9 % — SIGNIFICANT CHANGE UP (ref 2–14)
NEUTROPHILS # BLD AUTO: 6.6 K/UL — SIGNIFICANT CHANGE UP (ref 1.8–7.4)
NEUTROPHILS NFR BLD AUTO: 76.7 % — SIGNIFICANT CHANGE UP (ref 43–77)
PLATELET # BLD AUTO: 262 K/UL — SIGNIFICANT CHANGE UP (ref 150–400)
POTASSIUM SERPL-MCNC: 3.8 MMOL/L — SIGNIFICANT CHANGE UP (ref 3.5–5.3)
POTASSIUM SERPL-SCNC: 3.8 MMOL/L — SIGNIFICANT CHANGE UP (ref 3.5–5.3)
RBC # BLD: 4.84 M/UL — SIGNIFICANT CHANGE UP (ref 4.2–5.8)
RBC # FLD: 13.8 % — SIGNIFICANT CHANGE UP (ref 10.3–14.5)
SODIUM SERPL-SCNC: 142 MMOL/L — SIGNIFICANT CHANGE UP (ref 135–145)
WBC # BLD: 8.6 K/UL — SIGNIFICANT CHANGE UP (ref 3.8–10.5)
WBC # FLD AUTO: 8.6 K/UL — SIGNIFICANT CHANGE UP (ref 3.8–10.5)

## 2021-02-17 DIAGNOSIS — C79.51 SECONDARY MALIGNANT NEOPLASM OF BONE: ICD-10-CM

## 2021-02-17 LAB — PSA SERPL-MCNC: <0.01 NG/ML

## 2021-03-15 ENCOUNTER — RX RENEWAL (OUTPATIENT)
Age: 61
End: 2021-03-15

## 2021-04-05 ENCOUNTER — OUTPATIENT (OUTPATIENT)
Dept: OUTPATIENT SERVICES | Facility: HOSPITAL | Age: 61
LOS: 1 days | Discharge: HOME | End: 2021-04-05

## 2021-04-05 DIAGNOSIS — C61 MALIGNANT NEOPLASM OF PROSTATE: ICD-10-CM

## 2021-04-05 DIAGNOSIS — C79.51 SECONDARY MALIGNANT NEOPLASM OF BONE: ICD-10-CM

## 2021-04-09 ENCOUNTER — RESULT REVIEW (OUTPATIENT)
Age: 61
End: 2021-04-09

## 2021-04-09 ENCOUNTER — APPOINTMENT (OUTPATIENT)
Dept: HEMATOLOGY ONCOLOGY | Facility: CLINIC | Age: 61
End: 2021-04-09
Payer: COMMERCIAL

## 2021-04-09 VITALS
BODY MASS INDEX: 29.43 KG/M2 | HEIGHT: 73 IN | DIASTOLIC BLOOD PRESSURE: 106 MMHG | SYSTOLIC BLOOD PRESSURE: 162 MMHG | OXYGEN SATURATION: 98 % | WEIGHT: 222.02 LBS | HEART RATE: 69 BPM

## 2021-04-09 LAB
BASOPHILS # BLD AUTO: 0.1 K/UL — SIGNIFICANT CHANGE UP (ref 0–0.2)
BASOPHILS NFR BLD AUTO: 0.7 % — SIGNIFICANT CHANGE UP (ref 0–2)
EOSINOPHIL # BLD AUTO: 0.1 K/UL — SIGNIFICANT CHANGE UP (ref 0–0.5)
EOSINOPHIL NFR BLD AUTO: 0.6 % — SIGNIFICANT CHANGE UP (ref 0–6)
HCT VFR BLD CALC: 41.9 % — SIGNIFICANT CHANGE UP (ref 39–50)
HGB BLD-MCNC: 12.8 G/DL — LOW (ref 13–17)
LYMPHOCYTES # BLD AUTO: 1.2 K/UL — SIGNIFICANT CHANGE UP (ref 1–3.3)
LYMPHOCYTES # BLD AUTO: 11.6 % — LOW (ref 13–44)
MCHC RBC-ENTMCNC: 26.7 PG — LOW (ref 27–34)
MCHC RBC-ENTMCNC: 30.7 G/DL — LOW (ref 32–36)
MCV RBC AUTO: 87 FL — SIGNIFICANT CHANGE UP (ref 80–100)
MONOCYTES # BLD AUTO: 0.7 K/UL — SIGNIFICANT CHANGE UP (ref 0–0.9)
MONOCYTES NFR BLD AUTO: 7.1 % — SIGNIFICANT CHANGE UP (ref 2–14)
NEUTROPHILS # BLD AUTO: 8.1 K/UL — HIGH (ref 1.8–7.4)
NEUTROPHILS NFR BLD AUTO: 80 % — HIGH (ref 43–77)
PLATELET # BLD AUTO: 230 K/UL — SIGNIFICANT CHANGE UP (ref 150–400)
RBC # BLD: 4.81 M/UL — SIGNIFICANT CHANGE UP (ref 4.2–5.8)
RBC # FLD: 13.8 % — SIGNIFICANT CHANGE UP (ref 10.3–14.5)
WBC # BLD: 10.1 K/UL — SIGNIFICANT CHANGE UP (ref 3.8–10.5)
WBC # FLD AUTO: 10.1 K/UL — SIGNIFICANT CHANGE UP (ref 3.8–10.5)

## 2021-04-09 PROCEDURE — 99214 OFFICE O/P EST MOD 30 MIN: CPT

## 2021-04-09 PROCEDURE — 99072 ADDL SUPL MATRL&STAF TM PHE: CPT

## 2021-04-10 LAB
ALBUMIN SERPL ELPH-MCNC: 4.3 G/DL
ALP BLD-CCNC: 115 U/L
ALT SERPL-CCNC: 13 U/L
ANION GAP SERPL CALC-SCNC: 12 MMOL/L
AST SERPL-CCNC: 17 U/L
BILIRUB SERPL-MCNC: 0.3 MG/DL
BUN SERPL-MCNC: 11 MG/DL
CALCIUM SERPL-MCNC: 9.9 MG/DL
CHLORIDE SERPL-SCNC: 106 MMOL/L
CO2 SERPL-SCNC: 25 MMOL/L
CREAT SERPL-MCNC: 1.28 MG/DL
GLUCOSE SERPL-MCNC: 104 MG/DL
POTASSIUM SERPL-SCNC: 4.7 MMOL/L
PROT SERPL-MCNC: 6.8 G/DL
SODIUM SERPL-SCNC: 144 MMOL/L

## 2021-04-12 NOTE — ASSESSMENT
[FreeTextEntry1] : 61 y/o male with prostate cancer metastatic to bone. \par \par PSA 02/16/21: <0.01 ng/mL \par \par Plan:\par -Continue Eligard, Xgeva, and Zytiga/Prednisone\par - Ordering Effexor in hopes to help with hot flashes. Patient advised to take it before bed time. Discussed possible side effects. Patient to call with update in 4-6 weeks\par -Follow up in 3-6 months \par

## 2021-04-12 NOTE — HISTORY OF PRESENT ILLNESS
[T: ___] : T[unfilled] [N: ___] : N[unfilled] [M: ___] : M[unfilled] [AJCC Stage: ____] : AJCC Stage: [unfilled] [de-identified] : Mr. Colt Cedeno is a 60 year old male who presents for a follow up visit for severe advanced prostate cancer with painful bone mets. He was diagnosed with prostate cancer by urologist Dr. Dylon Franklin. He was admitted to the emergency room on 12/6/18 for increasing lower back pain and difficulty ambulating, with significant improvements from fentanyl (patch). He is s/p palliative radiation therapy to spine. Began Lupron and Casodex on recent [facility] admission, and was started on ketoconazole by Urology. He has since transitioned to Lupron/Zytiga/Prednisone hormonal therapy.\par \par Bone scan 11/4/2020\par -Multifocal osseous metastases in the spine, scapulae, b/l ribs and pelvic bones, similar in distribution but significantly decreased in extent and intensity , compared to bone scan of 11/2018\par - no new bony lesions [de-identified] : Patient doing well, has no acute complaints. Patient tolerating Lupron, Xgeva, Zytiga, and Prednisone with minimal side effects.States energy level and appetite are good. Admits mild low back pain at end of day but resolves next day.  Admits daily hot flashes. States the hot flashes can wake him up out of his sleep. Denies dysuria, hematuria, fever/chills, fatigue,nausea, vomiting, diarrhea,constipation,  abdominal pain, bleeding, easy bruising or visual changes, chest pain,  SOB or LEAL,  LE edema.\par

## 2021-05-17 ENCOUNTER — OUTPATIENT (OUTPATIENT)
Dept: OUTPATIENT SERVICES | Facility: HOSPITAL | Age: 61
LOS: 1 days | Discharge: HOME | End: 2021-05-17

## 2021-05-17 DIAGNOSIS — C61 MALIGNANT NEOPLASM OF PROSTATE: ICD-10-CM

## 2021-05-18 ENCOUNTER — APPOINTMENT (OUTPATIENT)
Age: 61
End: 2021-05-18

## 2021-05-19 DIAGNOSIS — C79.51 SECONDARY MALIGNANT NEOPLASM OF BONE: ICD-10-CM

## 2021-05-21 ENCOUNTER — APPOINTMENT (OUTPATIENT)
Dept: INTERNAL MEDICINE | Facility: CLINIC | Age: 61
End: 2021-05-21
Payer: COMMERCIAL

## 2021-05-21 VITALS — HEART RATE: 72 BPM | SYSTOLIC BLOOD PRESSURE: 146 MMHG | RESPIRATION RATE: 12 BRPM | DIASTOLIC BLOOD PRESSURE: 96 MMHG

## 2021-05-21 VITALS — WEIGHT: 219 LBS | BODY MASS INDEX: 29.03 KG/M2 | HEIGHT: 73 IN

## 2021-05-21 DIAGNOSIS — H93.13 TINNITUS, BILATERAL: ICD-10-CM

## 2021-05-21 PROCEDURE — 99072 ADDL SUPL MATRL&STAF TM PHE: CPT

## 2021-05-21 PROCEDURE — 99214 OFFICE O/P EST MOD 30 MIN: CPT

## 2021-05-21 NOTE — HISTORY OF PRESENT ILLNESS
[FreeTextEntry1] : follow up htn [de-identified] : follow up htn\par has been having occasional ringing in the ears\par has prostate cancer but with treatment has stablilized\par no chest pain sob nvd \par works in [employer]

## 2021-05-21 NOTE — ASSESSMENT
[FreeTextEntry1] : bp elevated low salt diet increase fluids\par increase amlodipine to 5\par follow up one month monitor at home\par prostate cancer stable\par tinnitus niacin, not pathologic and does not appear

## 2021-05-24 ENCOUNTER — RX RENEWAL (OUTPATIENT)
Age: 61
End: 2021-05-24

## 2021-06-28 ENCOUNTER — RX RENEWAL (OUTPATIENT)
Age: 61
End: 2021-06-28

## 2021-07-21 ENCOUNTER — RX RENEWAL (OUTPATIENT)
Age: 61
End: 2021-07-21

## 2021-08-05 ENCOUNTER — RX RENEWAL (OUTPATIENT)
Age: 61
End: 2021-08-05

## 2021-08-13 ENCOUNTER — OUTPATIENT (OUTPATIENT)
Dept: OUTPATIENT SERVICES | Facility: HOSPITAL | Age: 61
LOS: 1 days | Discharge: HOME | End: 2021-08-13

## 2021-08-13 DIAGNOSIS — C61 MALIGNANT NEOPLASM OF PROSTATE: ICD-10-CM

## 2021-08-17 ENCOUNTER — APPOINTMENT (OUTPATIENT)
Age: 61
End: 2021-08-17

## 2021-08-17 ENCOUNTER — RESULT REVIEW (OUTPATIENT)
Age: 61
End: 2021-08-17

## 2021-08-17 LAB
BASOPHILS # BLD AUTO: 0.1 K/UL — SIGNIFICANT CHANGE UP (ref 0–0.2)
BASOPHILS NFR BLD AUTO: 0.7 % — SIGNIFICANT CHANGE UP (ref 0–2)
BUN SERPL-MCNC: 19 MG/DL — SIGNIFICANT CHANGE UP (ref 7–23)
CA-I BLDA-SCNC: 1.25 MMOL/L — SIGNIFICANT CHANGE UP (ref 1.12–1.3)
CHLORIDE SERPL-SCNC: 102 MMOL/L — SIGNIFICANT CHANGE UP (ref 96–108)
CO2 SERPL-SCNC: 27 MMOL/L — SIGNIFICANT CHANGE UP (ref 22–31)
CREAT SERPL-MCNC: 1.4 MG/DL — HIGH (ref 0.5–1.3)
EOSINOPHIL # BLD AUTO: 0.1 K/UL — SIGNIFICANT CHANGE UP (ref 0–0.5)
EOSINOPHIL NFR BLD AUTO: 1.2 % — SIGNIFICANT CHANGE UP (ref 0–6)
GLUCOSE SERPL-MCNC: 114 MG/DL — HIGH (ref 70–99)
HCT VFR BLD CALC: 41.9 % — SIGNIFICANT CHANGE UP (ref 39–50)
HGB BLD-MCNC: 12.6 G/DL — LOW (ref 13–17)
LYMPHOCYTES # BLD AUTO: 1.6 K/UL — SIGNIFICANT CHANGE UP (ref 1–3.3)
LYMPHOCYTES # BLD AUTO: 21.7 % — SIGNIFICANT CHANGE UP (ref 13–44)
MCHC RBC-ENTMCNC: 26.6 PG — LOW (ref 27–34)
MCHC RBC-ENTMCNC: 30 G/DL — LOW (ref 32–36)
MCV RBC AUTO: 88.6 FL — SIGNIFICANT CHANGE UP (ref 80–100)
MONOCYTES # BLD AUTO: 0.7 K/UL — SIGNIFICANT CHANGE UP (ref 0–0.9)
MONOCYTES NFR BLD AUTO: 9.3 % — SIGNIFICANT CHANGE UP (ref 2–14)
NEUTROPHILS # BLD AUTO: 5 K/UL — SIGNIFICANT CHANGE UP (ref 1.8–7.4)
NEUTROPHILS NFR BLD AUTO: 67.1 % — SIGNIFICANT CHANGE UP (ref 43–77)
PLATELET # BLD AUTO: 235 K/UL — SIGNIFICANT CHANGE UP (ref 150–400)
POTASSIUM SERPL-MCNC: 4 MMOL/L — SIGNIFICANT CHANGE UP (ref 3.5–5.3)
POTASSIUM SERPL-SCNC: 4 MMOL/L — SIGNIFICANT CHANGE UP (ref 3.5–5.3)
RBC # BLD: 4.74 M/UL — SIGNIFICANT CHANGE UP (ref 4.2–5.8)
RBC # FLD: 13.7 % — SIGNIFICANT CHANGE UP (ref 10.3–14.5)
SODIUM SERPL-SCNC: 143 MMOL/L — SIGNIFICANT CHANGE UP (ref 135–145)
WBC # BLD: 7.4 K/UL — SIGNIFICANT CHANGE UP (ref 3.8–10.5)
WBC # FLD AUTO: 7.4 K/UL — SIGNIFICANT CHANGE UP (ref 3.8–10.5)

## 2021-08-18 DIAGNOSIS — C79.51 SECONDARY MALIGNANT NEOPLASM OF BONE: ICD-10-CM

## 2021-08-18 LAB — PSA SERPL-MCNC: <0.01 NG/ML

## 2021-09-22 ENCOUNTER — RX RENEWAL (OUTPATIENT)
Age: 61
End: 2021-09-22

## 2021-10-04 ENCOUNTER — OUTPATIENT (OUTPATIENT)
Dept: OUTPATIENT SERVICES | Facility: HOSPITAL | Age: 61
LOS: 1 days | Discharge: HOME | End: 2021-10-04

## 2021-10-04 DIAGNOSIS — C61 MALIGNANT NEOPLASM OF PROSTATE: ICD-10-CM

## 2021-10-06 ENCOUNTER — APPOINTMENT (OUTPATIENT)
Dept: HEMATOLOGY ONCOLOGY | Facility: CLINIC | Age: 61
End: 2021-10-06
Payer: COMMERCIAL

## 2021-10-06 ENCOUNTER — APPOINTMENT (OUTPATIENT)
Dept: HEMATOLOGY ONCOLOGY | Facility: CLINIC | Age: 61
End: 2021-10-06

## 2021-10-06 ENCOUNTER — RESULT REVIEW (OUTPATIENT)
Age: 61
End: 2021-10-06

## 2021-10-06 VITALS
OXYGEN SATURATION: 95 % | HEIGHT: 78 IN | HEART RATE: 75 BPM | BODY MASS INDEX: 24.41 KG/M2 | WEIGHT: 211 LBS | DIASTOLIC BLOOD PRESSURE: 94 MMHG | SYSTOLIC BLOOD PRESSURE: 160 MMHG

## 2021-10-06 LAB
BASOPHILS # BLD AUTO: 0 K/UL — SIGNIFICANT CHANGE UP (ref 0–0.2)
BASOPHILS NFR BLD AUTO: 0.4 % — SIGNIFICANT CHANGE UP (ref 0–2)
EOSINOPHIL # BLD AUTO: 0 K/UL — SIGNIFICANT CHANGE UP (ref 0–0.5)
EOSINOPHIL NFR BLD AUTO: 0.4 % — SIGNIFICANT CHANGE UP (ref 0–6)
HCT VFR BLD CALC: 44.2 % — SIGNIFICANT CHANGE UP (ref 39–50)
HGB BLD-MCNC: 13.3 G/DL — SIGNIFICANT CHANGE UP (ref 13–17)
LYMPHOCYTES # BLD AUTO: 1.1 K/UL — SIGNIFICANT CHANGE UP (ref 1–3.3)
LYMPHOCYTES # BLD AUTO: 10.6 % — LOW (ref 13–44)
MCHC RBC-ENTMCNC: 26.6 PG — LOW (ref 27–34)
MCHC RBC-ENTMCNC: 30.2 G/DL — LOW (ref 32–36)
MCV RBC AUTO: 88.2 FL — SIGNIFICANT CHANGE UP (ref 80–100)
MONOCYTES # BLD AUTO: 0.7 K/UL — SIGNIFICANT CHANGE UP (ref 0–0.9)
MONOCYTES NFR BLD AUTO: 6.2 % — SIGNIFICANT CHANGE UP (ref 2–14)
NEUTROPHILS # BLD AUTO: 8.6 K/UL — HIGH (ref 1.8–7.4)
NEUTROPHILS NFR BLD AUTO: 82.3 % — HIGH (ref 43–77)
PLATELET # BLD AUTO: 274 K/UL — SIGNIFICANT CHANGE UP (ref 150–400)
RBC # BLD: 5 M/UL — SIGNIFICANT CHANGE UP (ref 4.2–5.8)
RBC # FLD: 13.5 % — SIGNIFICANT CHANGE UP (ref 10.3–14.5)
WBC # BLD: 10.4 K/UL — SIGNIFICANT CHANGE UP (ref 3.8–10.5)
WBC # FLD AUTO: 10.4 K/UL — SIGNIFICANT CHANGE UP (ref 3.8–10.5)

## 2021-10-06 PROCEDURE — 99214 OFFICE O/P EST MOD 30 MIN: CPT

## 2021-10-06 RX ORDER — CYCLOBENZAPRINE HYDROCHLORIDE 5 MG/1
5 TABLET, FILM COATED ORAL
Refills: 0 | Status: DISCONTINUED | COMMUNITY
End: 2021-10-06

## 2021-10-06 NOTE — HISTORY OF PRESENT ILLNESS
[T: ___] : T[unfilled] [N: ___] : N[unfilled] [M: ___] : M[unfilled] [AJCC Stage: ____] : AJCC Stage: [unfilled] [de-identified] : Mr. Colt Cedeno is a 60 year old male who presents for a follow up visit for severe advanced prostate cancer with painful bone mets. He was diagnosed with prostate cancer by urologist Dr. Dylon Franklin. He was admitted to the emergency room on 12/6/18 for increasing lower back pain and difficulty ambulating, with significant improvements from fentanyl (patch). He is s/p palliative radiation therapy to spine. Began Lupron and Casodex on recent [facility] admission, and was started on ketoconazole by Urology. He has since transitioned to Lupron/Zytiga/Prednisone hormonal therapy.\par \par Bone scan 11/4/2020\par -Multifocal osseous metastases in the spine, scapulae, b/l ribs and pelvic bones, similar in distribution but significantly decreased in extent and intensity , compared to bone scan of 11/2018\par - no new bony lesions [de-identified] : Patient doing well, has no acute complaints. Patient tolerating Lupron, Xgeva, Zytiga, and Prednisone with minimal side effects.States energy level and appetite are good. Admits mild low back pain at end of day but resolves next day.  Admits daily hot flashes. States the hot flashes can wake him up out of his sleep. Denies dysuria, hematuria, fever/chills, fatigue,nausea, vomiting, diarrhea,constipation,  abdominal pain, bleeding, easy bruising or visual changes, chest pain,  SOB or LEAL,  LE edema.\par \par 10/6/21: Colt is here for follow up. He reports that he continues to have hot flashes despite taking Effexor for the past several months. States the hot flashes can wake him up out of his sleep.  Mild fatigue. Appetite is good. Lost 8 lbs in the past 5 months intentionally. Denies HA. CP, SOB, abd pain, constipation, diarrhea, melena, hematuria, dysuria.

## 2021-10-06 NOTE — ASSESSMENT
[FreeTextEntry1] : 61 y/o male with prostate cancer metastatic to bone. \par \par PSA 08/17/21: <0.01 ng/mL \par \par Plan:\par -Continue Eligard, Xgeva, and Zytiga/Prednisone\par -Oscal for bone health\par -will taper off Effexor as it is not helping\par -Follow up in 3-6 months \par

## 2021-10-07 ENCOUNTER — RX RENEWAL (OUTPATIENT)
Age: 61
End: 2021-10-07

## 2021-10-08 LAB
ALBUMIN SERPL ELPH-MCNC: 4.3 G/DL
ALP BLD-CCNC: 123 U/L
ALT SERPL-CCNC: 14 U/L
ANION GAP SERPL CALC-SCNC: 11 MMOL/L
AST SERPL-CCNC: 16 U/L
BILIRUB SERPL-MCNC: 0.3 MG/DL
BUN SERPL-MCNC: 12 MG/DL
CALCIUM SERPL-MCNC: 9.9 MG/DL
CHLORIDE SERPL-SCNC: 104 MMOL/L
CO2 SERPL-SCNC: 28 MMOL/L
CREAT SERPL-MCNC: 1.31 MG/DL
GLUCOSE SERPL-MCNC: 103 MG/DL
LDH SERPL-CCNC: 201 U/L
POTASSIUM SERPL-SCNC: 4.9 MMOL/L
PROT SERPL-MCNC: 7.2 G/DL
PSA SERPL-MCNC: <0.01 NG/ML
SODIUM SERPL-SCNC: 143 MMOL/L
TESTOST BND SERPL-MCNC: <0.2 PG/ML
TESTOSTERONE TOTAL S: <3 NG/DL

## 2021-11-12 ENCOUNTER — OUTPATIENT (OUTPATIENT)
Dept: OUTPATIENT SERVICES | Facility: HOSPITAL | Age: 61
LOS: 1 days | Discharge: HOME | End: 2021-11-12

## 2021-11-12 DIAGNOSIS — C61 MALIGNANT NEOPLASM OF PROSTATE: ICD-10-CM

## 2021-11-16 ENCOUNTER — APPOINTMENT (OUTPATIENT)
Age: 61
End: 2021-11-16

## 2021-11-17 DIAGNOSIS — C79.51 SECONDARY MALIGNANT NEOPLASM OF BONE: ICD-10-CM

## 2021-12-03 ENCOUNTER — RX RENEWAL (OUTPATIENT)
Age: 61
End: 2021-12-03

## 2021-12-06 ENCOUNTER — RX RENEWAL (OUTPATIENT)
Age: 61
End: 2021-12-06

## 2021-12-13 ENCOUNTER — RX RENEWAL (OUTPATIENT)
Age: 61
End: 2021-12-13

## 2022-01-31 ENCOUNTER — RX RENEWAL (OUTPATIENT)
Age: 62
End: 2022-01-31

## 2022-02-04 ENCOUNTER — OUTPATIENT (OUTPATIENT)
Dept: OUTPATIENT SERVICES | Facility: HOSPITAL | Age: 62
LOS: 1 days | Discharge: HOME | End: 2022-02-04

## 2022-02-04 DIAGNOSIS — C61 MALIGNANT NEOPLASM OF PROSTATE: ICD-10-CM

## 2022-02-09 ENCOUNTER — RESULT REVIEW (OUTPATIENT)
Age: 62
End: 2022-02-09

## 2022-02-09 ENCOUNTER — APPOINTMENT (OUTPATIENT)
Dept: HEMATOLOGY ONCOLOGY | Facility: CLINIC | Age: 62
End: 2022-02-09
Payer: COMMERCIAL

## 2022-02-09 VITALS
HEART RATE: 78 BPM | SYSTOLIC BLOOD PRESSURE: 161 MMHG | BODY MASS INDEX: 28.23 KG/M2 | HEIGHT: 73 IN | OXYGEN SATURATION: 98 % | DIASTOLIC BLOOD PRESSURE: 93 MMHG | WEIGHT: 213.03 LBS

## 2022-02-09 LAB
BASOPHILS # BLD AUTO: 0.1 K/UL — SIGNIFICANT CHANGE UP (ref 0–0.2)
BASOPHILS NFR BLD AUTO: 1.1 % — SIGNIFICANT CHANGE UP (ref 0–2)
EOSINOPHIL # BLD AUTO: 0.1 K/UL — SIGNIFICANT CHANGE UP (ref 0–0.5)
EOSINOPHIL NFR BLD AUTO: 1.5 % — SIGNIFICANT CHANGE UP (ref 0–6)
HCT VFR BLD CALC: 41.8 % — SIGNIFICANT CHANGE UP (ref 39–50)
HGB BLD-MCNC: 12.7 G/DL — LOW (ref 13–17)
LYMPHOCYTES # BLD AUTO: 1.6 K/UL — SIGNIFICANT CHANGE UP (ref 1–3.3)
LYMPHOCYTES # BLD AUTO: 19.1 % — SIGNIFICANT CHANGE UP (ref 13–44)
MCHC RBC-ENTMCNC: 26.3 PG — LOW (ref 27–34)
MCHC RBC-ENTMCNC: 30.4 G/DL — LOW (ref 32–36)
MCV RBC AUTO: 86.5 FL — SIGNIFICANT CHANGE UP (ref 80–100)
MONOCYTES # BLD AUTO: 0.9 K/UL — SIGNIFICANT CHANGE UP (ref 0–0.9)
MONOCYTES NFR BLD AUTO: 11.3 % — SIGNIFICANT CHANGE UP (ref 2–14)
NEUTROPHILS # BLD AUTO: 5.4 K/UL — SIGNIFICANT CHANGE UP (ref 1.8–7.4)
NEUTROPHILS NFR BLD AUTO: 67 % — SIGNIFICANT CHANGE UP (ref 43–77)
PLATELET # BLD AUTO: 248 K/UL — SIGNIFICANT CHANGE UP (ref 150–400)
RBC # BLD: 4.84 M/UL — SIGNIFICANT CHANGE UP (ref 4.2–5.8)
RBC # FLD: 13.3 % — SIGNIFICANT CHANGE UP (ref 10.3–14.5)
WBC # BLD: 8.1 K/UL — SIGNIFICANT CHANGE UP (ref 3.8–10.5)
WBC # FLD AUTO: 8.1 K/UL — SIGNIFICANT CHANGE UP (ref 3.8–10.5)

## 2022-02-09 PROCEDURE — 99214 OFFICE O/P EST MOD 30 MIN: CPT

## 2022-02-09 NOTE — ASSESSMENT
[FreeTextEntry1] : 59 y/o male with prostate cancer metastatic to bone. \par \par PSA 08/17/21: <0.01 ng/mL \par \par Plan:\par -Continue Eligard, Xgeva, and Zytiga/Prednisone\par -Oscal for bone health\par -will taper off Effexor as it is not helping\par -Follow up in 3-6 months \par

## 2022-02-09 NOTE — HISTORY OF PRESENT ILLNESS
[T: ___] : T[unfilled] [N: ___] : N[unfilled] [M: ___] : M[unfilled] [AJCC Stage: ____] : AJCC Stage: [unfilled] [de-identified] : Mr. Colt Cedeno is a 60 year old male who presents for a follow up visit for severe advanced prostate cancer with painful bone mets. He was diagnosed with prostate cancer by urologist Dr. Dylon Franklin. He was admitted to the emergency room on 12/6/18 for increasing lower back pain and difficulty ambulating, with significant improvements from fentanyl (patch). He is s/p palliative radiation therapy to spine. Began Lupron and Casodex on recent [facility] admission, and was started on ketoconazole by Urology. He has since transitioned to Lupron/Zytiga/Prednisone hormonal therapy.\par \par Bone scan 11/4/2020\par -Multifocal osseous metastases in the spine, scapulae, b/l ribs and pelvic bones, similar in distribution but significantly decreased in extent and intensity , compared to bone scan of 11/2018\par - no new bony lesions [de-identified] : Patient doing well, has no acute complaints. Patient tolerating Lupron, Xgeva, Zytiga, and Prednisone with minimal side effects.States energy level and appetite are good. Admits mild low back pain at end of day but resolves next day.  Admits daily hot flashes. States the hot flashes can wake him up out of his sleep. Denies dysuria, hematuria, fever/chills, fatigue,nausea, vomiting, diarrhea,constipation,  abdominal pain, bleeding, easy bruising or visual changes, chest pain,  SOB or LEAL,  LE edema.\par \par 10/6/21: Colt is here for follow up. He reports that he continues to have hot flashes despite taking Effexor for the past several months. States the hot flashes can wake him up out of his sleep.  Mild fatigue. Appetite is good. Lost 8 lbs in the past 5 months intentionally. Denies HA. CP, SOB, abd pain, constipation, diarrhea, melena, hematuria, dysuria. \par \par 2/9/22: Colt is here for follow up. He reports that he continues to have hot flashes despite taking Effexor for the past several months. States the hot flashes can wake him up out of his sleep.  Mild fatigue. occassional migraine that resolves on its own. Continues to work as . Denies HA. CP, SOB, abd pain, constipation, diarrhea, melena, hematuria, dysuria.

## 2022-02-10 ENCOUNTER — RX RENEWAL (OUTPATIENT)
Age: 62
End: 2022-02-10

## 2022-02-15 LAB
ALBUMIN SERPL ELPH-MCNC: 4.1 G/DL
ALP BLD-CCNC: 118 U/L
ALT SERPL-CCNC: 16 U/L
ANION GAP SERPL CALC-SCNC: 13 MMOL/L
AST SERPL-CCNC: 15 U/L
BILIRUB SERPL-MCNC: 0.2 MG/DL
BUN SERPL-MCNC: 12 MG/DL
CALCIUM SERPL-MCNC: 10.2 MG/DL
CHLORIDE SERPL-SCNC: 104 MMOL/L
CO2 SERPL-SCNC: 27 MMOL/L
CREAT SERPL-MCNC: 1.38 MG/DL
GLUCOSE SERPL-MCNC: 95 MG/DL
LDH SERPL-CCNC: 220 U/L
MAGNESIUM SERPL-MCNC: 1.7 MG/DL
POTASSIUM SERPL-SCNC: 4.1 MMOL/L
PROT SERPL-MCNC: 6.7 G/DL
PSA SERPL-MCNC: <0.01 NG/ML
SODIUM SERPL-SCNC: 144 MMOL/L
TESTOST SERPL-MCNC: <2.5 NG/DL

## 2022-02-16 ENCOUNTER — APPOINTMENT (OUTPATIENT)
Age: 62
End: 2022-02-16

## 2022-02-17 DIAGNOSIS — C79.51 SECONDARY MALIGNANT NEOPLASM OF BONE: ICD-10-CM

## 2022-04-11 ENCOUNTER — RX RENEWAL (OUTPATIENT)
Age: 62
End: 2022-04-11

## 2022-05-06 ENCOUNTER — OUTPATIENT (OUTPATIENT)
Dept: OUTPATIENT SERVICES | Facility: HOSPITAL | Age: 62
LOS: 1 days | Discharge: HOME | End: 2022-05-06

## 2022-05-06 DIAGNOSIS — C61 MALIGNANT NEOPLASM OF PROSTATE: ICD-10-CM

## 2022-05-11 ENCOUNTER — APPOINTMENT (OUTPATIENT)
Dept: HEMATOLOGY ONCOLOGY | Facility: CLINIC | Age: 62
End: 2022-05-11
Payer: COMMERCIAL

## 2022-05-11 ENCOUNTER — RESULT REVIEW (OUTPATIENT)
Age: 62
End: 2022-05-11

## 2022-05-11 ENCOUNTER — RX RENEWAL (OUTPATIENT)
Age: 62
End: 2022-05-11

## 2022-05-11 VITALS
HEART RATE: 84 BPM | WEIGHT: 210 LBS | SYSTOLIC BLOOD PRESSURE: 160 MMHG | DIASTOLIC BLOOD PRESSURE: 97 MMHG | HEIGHT: 73 IN | OXYGEN SATURATION: 96 % | BODY MASS INDEX: 27.83 KG/M2

## 2022-05-11 LAB
BASOPHILS # BLD AUTO: 0.1 K/UL — SIGNIFICANT CHANGE UP (ref 0–0.2)
BASOPHILS NFR BLD AUTO: 0.8 % — SIGNIFICANT CHANGE UP (ref 0–2)
EOSINOPHIL # BLD AUTO: 0.1 K/UL — SIGNIFICANT CHANGE UP (ref 0–0.5)
EOSINOPHIL NFR BLD AUTO: 1.4 % — SIGNIFICANT CHANGE UP (ref 0–6)
HCT VFR BLD CALC: 42.6 % — SIGNIFICANT CHANGE UP (ref 39–50)
HGB BLD-MCNC: 13.1 G/DL — SIGNIFICANT CHANGE UP (ref 13–17)
LYMPHOCYTES # BLD AUTO: 1.6 K/UL — SIGNIFICANT CHANGE UP (ref 1–3.3)
LYMPHOCYTES # BLD AUTO: 18.9 % — SIGNIFICANT CHANGE UP (ref 13–44)
MCHC RBC-ENTMCNC: 26.9 PG — LOW (ref 27–34)
MCHC RBC-ENTMCNC: 30.7 G/DL — LOW (ref 32–36)
MCV RBC AUTO: 87.5 FL — SIGNIFICANT CHANGE UP (ref 80–100)
MONOCYTES # BLD AUTO: 0.9 K/UL — SIGNIFICANT CHANGE UP (ref 0–0.9)
MONOCYTES NFR BLD AUTO: 10.2 % — SIGNIFICANT CHANGE UP (ref 2–14)
NEUTROPHILS # BLD AUTO: 5.9 K/UL — SIGNIFICANT CHANGE UP (ref 1.8–7.4)
NEUTROPHILS NFR BLD AUTO: 68.7 % — SIGNIFICANT CHANGE UP (ref 43–77)
PLATELET # BLD AUTO: 294 K/UL — SIGNIFICANT CHANGE UP (ref 150–400)
RBC # BLD: 4.87 M/UL — SIGNIFICANT CHANGE UP (ref 4.2–5.8)
RBC # FLD: 13.6 % — SIGNIFICANT CHANGE UP (ref 10.3–14.5)
WBC # BLD: 8.5 K/UL — SIGNIFICANT CHANGE UP (ref 3.8–10.5)
WBC # FLD AUTO: 8.5 K/UL — SIGNIFICANT CHANGE UP (ref 3.8–10.5)

## 2022-05-11 PROCEDURE — 99214 OFFICE O/P EST MOD 30 MIN: CPT

## 2022-05-16 ENCOUNTER — APPOINTMENT (OUTPATIENT)
Age: 62
End: 2022-05-16

## 2022-05-17 DIAGNOSIS — C79.51 SECONDARY MALIGNANT NEOPLASM OF BONE: ICD-10-CM

## 2022-05-17 LAB
ALBUMIN SERPL ELPH-MCNC: 4.1 G/DL
ALP BLD-CCNC: 164 U/L
ALT SERPL-CCNC: 10 U/L
ANION GAP SERPL CALC-SCNC: 12 MMOL/L
AST SERPL-CCNC: 14 U/L
BILIRUB SERPL-MCNC: 0.2 MG/DL
BUN SERPL-MCNC: 11 MG/DL
CALCIUM SERPL-MCNC: 8.8 MG/DL
CHLORIDE SERPL-SCNC: 104 MMOL/L
CO2 SERPL-SCNC: 26 MMOL/L
CREAT SERPL-MCNC: 1.29 MG/DL
EGFR: 62 ML/MIN/1.73M2
GLUCOSE SERPL-MCNC: 140 MG/DL
MAGNESIUM SERPL-MCNC: 1.7 MG/DL
POTASSIUM SERPL-SCNC: 3.6 MMOL/L
PROT SERPL-MCNC: 7.2 G/DL
PSA SERPL-MCNC: <0.01 NG/ML
SODIUM SERPL-SCNC: 142 MMOL/L
TESTOST SERPL-MCNC: <2.5 NG/DL

## 2022-05-17 NOTE — ASSESSMENT
[FreeTextEntry1] : 61 y/o male with prostate cancer metastatic to bone. \par \par -Continue Eligard, Xgeva, and Zytiga/Prednisone\par -PSA remains <0.01 ng/mL since 6/2020\par -Oscal for bone health\par -discontinued Effexor as it was not helping his hot flashes\par -needs to see PCP ASAP regarding HTN, discussed at length today\par -advised ENT evaluation for left ear discomfort, only mild erythema on exam\par -Follow up in 3 months with Dr. Guy\par

## 2022-05-17 NOTE — HISTORY OF PRESENT ILLNESS
[T: ___] : T[unfilled] [N: ___] : N[unfilled] [M: ___] : M[unfilled] [AJCC Stage: ____] : AJCC Stage: [unfilled] [de-identified] : Mr. Colt Cedeno is a 60 year old male who presents for a follow up visit for severe advanced prostate cancer with painful bone mets. He was diagnosed with prostate cancer by urologist Dr. Dylon Franklin. He was admitted to the emergency room on 12/6/18 for increasing lower back pain and difficulty ambulating, with significant improvements from fentanyl (patch). He is s/p palliative radiation therapy to spine. Began Lupron and Casodex on recent [facility] admission, and was started on ketoconazole by Urology. He has since transitioned to Lupron/Zytiga/Prednisone hormonal therapy.\par \par Bone scan 11/4/2020\par -Multifocal osseous metastases in the spine, scapulae, b/l ribs and pelvic bones, similar in distribution but significantly decreased in extent and intensity , compared to bone scan of 11/2018\par - no new bony lesions [de-identified] : Patient doing well, has no acute complaints. Patient tolerating Lupron, Xgeva, Zytiga, and Prednisone with minimal side effects.States energy level and appetite are good. Admits mild low back pain at end of day but resolves next day.  Admits daily hot flashes. States the hot flashes can wake him up out of his sleep. Denies dysuria, hematuria, fever/chills, fatigue,nausea, vomiting, diarrhea,constipation,  abdominal pain, bleeding, easy bruising or visual changes, chest pain,  SOB or LEAL,  LE edema.\par \par 10/6/21: Colt is here for follow up. He reports that he continues to have hot flashes despite taking Effexor for the past several months. States the hot flashes can wake him up out of his sleep.  Mild fatigue. Appetite is good. Lost 8 lbs in the past 5 months intentionally. Denies HA. CP, SOB, abd pain, constipation, diarrhea, melena, hematuria, dysuria. \par \par 2/9/22: Colt is here for follow up. He reports that he continues to have hot flashes despite taking Effexor for the past several months. States the hot flashes can wake him up out of his sleep.  Mild fatigue. occasional migraine that resolves on its own. Continues to work as . Denies HA. CP, SOB, abd pain, constipation, diarrhea, melena, hematuria, dysuria. \par \par 5/11/22: Patient presents on Eligard, Xgeva, Zytiga and Prednisone for prostate cancer metastatic to bone. \par + HTN with occasional headaches. + Hot flashes and diaphoresis. + Mild fatigue. + Cough. + Left ear ache. No edema, N/V/D/C, dyspepsia, arthralgia/myalgia, fever, cough or SOB.

## 2022-06-17 ENCOUNTER — APPOINTMENT (OUTPATIENT)
Dept: INTERNAL MEDICINE | Facility: CLINIC | Age: 62
End: 2022-06-17
Payer: COMMERCIAL

## 2022-06-17 VITALS — SYSTOLIC BLOOD PRESSURE: 130 MMHG | WEIGHT: 203 LBS | BODY MASS INDEX: 26.78 KG/M2 | DIASTOLIC BLOOD PRESSURE: 90 MMHG

## 2022-06-17 DIAGNOSIS — H53.8 OTHER VISUAL DISTURBANCES: ICD-10-CM

## 2022-06-17 PROCEDURE — 99214 OFFICE O/P EST MOD 30 MIN: CPT

## 2022-06-17 NOTE — ASSESSMENT
[FreeTextEntry1] : prostate cancer psa less than .01\par blurred vision suspect glaucoma or cataracts due to steroid  use\par bp slightly elevated add losartent\par followup 6 weeks\par bp check\par labs reveiwed look stable

## 2022-06-17 NOTE — HISTORY OF PRESENT ILLNESS
[FreeTextEntry1] : follow up  [de-identified] : follow up htn \par history of stage 4 cancer has been taking meds psa at .01\par has been having left eye blurred vision\par he takes steroids, no pain but mild headaches\par no dizziness no chest pain no pain\par has been doing well with prostate cancer meds

## 2022-06-20 ENCOUNTER — RX RENEWAL (OUTPATIENT)
Age: 62
End: 2022-06-20

## 2022-06-23 NOTE — VITALS
[FreeTextEntry1] : Patient is a 68 year-old Black gentleman with history as above who presents today for cardiac evaluation prior to possible renal transplant.\par \par Obtain recent TTE performed at nephrologist.\par Nuclear stress test to evaluate for ischemia in patient with known coronary artery disease. [Maximal Pain Intensity: 2/10] : 2/10 [Least Pain Intensity: 0/10] : 0/10 [Pain Description/Quality: ___] : Pain description/quality: [unfilled] [Pain Duration: ___] : Pain duration: [unfilled] [Pain Location: ___] : Pain Location: [unfilled] [Pain Interferes with ADLs] : Pain interferes with activities of daily living. [80: Normal activity with effort; some signs or symptoms of disease.] : 80: Normal activity with effort; some signs or symptoms of disease.  [ECOG Performance Status: 1 - Restricted in physically strenuous activity but ambulatory and able to carry out work of a light or sedentary nature] : Performance Status: 1 - Restricted in physically strenuous activity but ambulatory and able to carry out work of a light or sedentary nature, e.g., light house work, office work

## 2022-07-07 ENCOUNTER — RX RENEWAL (OUTPATIENT)
Age: 62
End: 2022-07-07

## 2022-07-07 RX ORDER — AMLODIPINE BESYLATE 2.5 MG/1
2.5 TABLET ORAL
Qty: 90 | Refills: 2 | Status: ACTIVE | COMMUNITY
Start: 2022-07-07 | End: 1900-01-01

## 2022-07-18 ENCOUNTER — OFFICE (OUTPATIENT)
Dept: URBAN - METROPOLITAN AREA CLINIC 104 | Facility: CLINIC | Age: 62
Setting detail: OPHTHALMOLOGY
End: 2022-07-18
Payer: COMMERCIAL

## 2022-07-18 DIAGNOSIS — H40.1122: ICD-10-CM

## 2022-07-18 DIAGNOSIS — H25.13: ICD-10-CM

## 2022-07-18 DIAGNOSIS — H52.213: ICD-10-CM

## 2022-07-18 PROCEDURE — 92136 OPHTHALMIC BIOMETRY: CPT | Performed by: OPHTHALMOLOGY

## 2022-07-18 PROCEDURE — 92004 COMPRE OPH EXAM NEW PT 1/>: CPT | Performed by: OPHTHALMOLOGY

## 2022-07-18 ASSESSMENT — KERATOMETRY
OD_K2POWER_DIOPTERS: 41.67
OS_K1POWER_DIOPTERS: 41.46
OS_AXISANGLE2_DEGREES: 064
OD_K1POWER_DIOPTERS: 40.76
OD_K1POWER_DIOPTERS: 40.76
OD_K2POWER_DIOPTERS: 41.62
OS_AXISANGLE_DEGREES: 016
OS_CYLAXISANGLE_DEGREES: 064
OS_K2POWER_DIOPTERS: 41.67
OS_K2POWER_DIOPTERS: 41.93
OS_K1POWER_DIOPTERS: 41.46
OD_CYLAXISANGLE_DEGREES: 142
OD_CYLPOWER_DEGREES: 0.91
OD_AXISANGLE2_DEGREES: 142
OD_AXISANGLE_DEGREES: 147
OD_K1K2_AVERAGE: 41.22
OS_AXISANGLE_DEGREES: 064
OD_AXISANGLE_DEGREES: 142
OS_K1K2_AVERAGE: 41.7
OS_CYLPOWER_DEGREES: 0.47

## 2022-07-18 ASSESSMENT — AXIALLENGTH_DERIVED
OS_AL: 25.07
OS_AL: 25.24
OD_AL: 25.05
OD_AL: 25.33

## 2022-07-18 ASSESSMENT — REFRACTION_MANIFEST
OS_CYLINDER: -0.50
OD_SPHERE: -1.50
OD_VA1: 20/25
OD_AXIS: 065
OS_AXIS: 140
OS_SPHERE: -2.00
OD_CYLINDER: -1.00

## 2022-07-18 ASSESSMENT — REFRACTION_CURRENTRX
OS_AXIS: 141
OS_CYLINDER: -0.50
OS_SPHERE: -1.75
OS_OVR_VA: 20/
OD_CYLINDER: -1.00
OD_SPHERE: -1.75
OD_AXIS: 065
OD_OVR_VA: 20/

## 2022-07-18 ASSESSMENT — REFRACTION_AUTOREFRACTION
OS_AXIS: 127
OS_SPHERE: -1.25
OD_CYLINDER: -0.75
OD_SPHERE: -1.00
OD_AXIS: 075
OS_CYLINDER: -1.25

## 2022-07-18 ASSESSMENT — SPHEQUIV_DERIVED
OD_SPHEQUIV: -2
OD_SPHEQUIV: -1.375
OS_SPHEQUIV: -2.25
OS_SPHEQUIV: -1.875

## 2022-07-18 ASSESSMENT — TONOMETRY: OD_IOP_MMHG: 19

## 2022-07-18 ASSESSMENT — CONFRONTATIONAL VISUAL FIELD TEST (CVF)
OS_FINDINGS: FULL
OD_FINDINGS: FULL

## 2022-07-18 ASSESSMENT — VISUAL ACUITY
OD_BCVA: 20/30-2
OS_BCVA: 20/25

## 2022-07-25 ENCOUNTER — RX ONLY (RX ONLY)
Age: 62
End: 2022-07-25

## 2022-07-25 ENCOUNTER — OFFICE (OUTPATIENT)
Dept: URBAN - METROPOLITAN AREA CLINIC 104 | Facility: CLINIC | Age: 62
Setting detail: OPHTHALMOLOGY
End: 2022-07-25
Payer: COMMERCIAL

## 2022-07-25 DIAGNOSIS — H25.13: ICD-10-CM

## 2022-07-25 DIAGNOSIS — H40.1122: ICD-10-CM

## 2022-07-25 PROBLEM — H52.213 IRREGULAR ASTIGMATISM; BOTH EYES: Status: ACTIVE | Noted: 2022-07-18

## 2022-07-25 PROCEDURE — 92133 CPTRZD OPH DX IMG PST SGM ON: CPT | Performed by: OPHTHALMOLOGY

## 2022-07-25 PROCEDURE — 92014 COMPRE OPH EXAM EST PT 1/>: CPT | Performed by: OPHTHALMOLOGY

## 2022-07-25 ASSESSMENT — CONFRONTATIONAL VISUAL FIELD TEST (CVF)
OS_FINDINGS: FULL
OD_FINDINGS: FULL

## 2022-07-25 ASSESSMENT — TONOMETRY: OD_IOP_MMHG: 19

## 2022-07-28 ENCOUNTER — APPOINTMENT (OUTPATIENT)
Dept: INTERNAL MEDICINE | Facility: CLINIC | Age: 62
End: 2022-07-28

## 2022-07-28 VITALS
DIASTOLIC BLOOD PRESSURE: 90 MMHG | HEART RATE: 72 BPM | SYSTOLIC BLOOD PRESSURE: 144 MMHG | BODY MASS INDEX: 26.91 KG/M2 | WEIGHT: 204 LBS

## 2022-07-28 PROCEDURE — 99214 OFFICE O/P EST MOD 30 MIN: CPT

## 2022-07-28 RX ORDER — LOSARTAN POTASSIUM 100 MG/1
100 TABLET, FILM COATED ORAL DAILY
Qty: 90 | Refills: 3 | Status: ACTIVE | COMMUNITY
Start: 2022-06-17 | End: 1900-01-01

## 2022-07-28 RX ORDER — AMLODIPINE BESYLATE 5 MG/1
5 TABLET ORAL DAILY
Qty: 90 | Refills: 2 | Status: DISCONTINUED | COMMUNITY
Start: 2019-12-12 | End: 2022-07-28

## 2022-07-28 NOTE — HEALTH RISK ASSESSMENT
[Never] : Never [No] : No [No falls in past year] : Patient reported no falls in the past year [0] : 2) Feeling down, depressed, or hopeless: Not at all (0) [PHQ-2 Negative - No further assessment needed] : PHQ-2 Negative - No further assessment needed [[id number]] : 0

## 2022-07-28 NOTE — ASSESSMENT
[FreeTextEntry1] : increase losarten to 100\par metastatic prostate cancer\par continue treatment\par labs stable\par follow up 2 months for fall vaccines and bp check

## 2022-07-28 NOTE — HISTORY OF PRESENT ILLNESS
[FreeTextEntry1] : follow up bp [de-identified] : follow up bp\par not monitoring regularly\par no side effect from meds\par bp still slightly elevated today\par no worsening headache dizziness

## 2022-08-10 ENCOUNTER — OUTPATIENT (OUTPATIENT)
Dept: OUTPATIENT SERVICES | Facility: HOSPITAL | Age: 62
LOS: 1 days | Discharge: HOME | End: 2022-08-10

## 2022-08-10 DIAGNOSIS — C61 MALIGNANT NEOPLASM OF PROSTATE: ICD-10-CM

## 2022-08-10 DIAGNOSIS — C79.51 SECONDARY MALIGNANT NEOPLASM OF BONE: ICD-10-CM

## 2022-08-15 ENCOUNTER — INPATIENT (INPATIENT)
Facility: HOSPITAL | Age: 62
LOS: 3 days | Discharge: HOME | DRG: 158 | End: 2022-08-19
Attending: HOSPITALIST | Admitting: HOSPITALIST
Payer: COMMERCIAL

## 2022-08-15 PROCEDURE — 99285 EMERGENCY DEPT VISIT HI MDM: CPT

## 2022-08-16 VITALS
RESPIRATION RATE: 18 BRPM | SYSTOLIC BLOOD PRESSURE: 161 MMHG | TEMPERATURE: 99 F | HEART RATE: 84 BPM | OXYGEN SATURATION: 98 % | DIASTOLIC BLOOD PRESSURE: 93 MMHG | HEIGHT: 73 IN | WEIGHT: 210.1 LBS

## 2022-08-16 DIAGNOSIS — M86.10 OTHER ACUTE OSTEOMYELITIS, UNSPECIFIED SITE: ICD-10-CM

## 2022-08-16 DIAGNOSIS — R22.0 LOCALIZED SWELLING, MASS AND LUMP, HEAD: ICD-10-CM

## 2022-08-16 LAB
ALBUMIN SERPL ELPH-MCNC: 3.6 G/DL — SIGNIFICANT CHANGE UP (ref 3.3–5.2)
ALP SERPL-CCNC: 166 U/L — HIGH (ref 40–120)
ALT FLD-CCNC: 8 U/L — SIGNIFICANT CHANGE UP
ANION GAP SERPL CALC-SCNC: 9 MMOL/L — SIGNIFICANT CHANGE UP (ref 5–17)
AST SERPL-CCNC: 11 U/L — SIGNIFICANT CHANGE UP
BASOPHILS # BLD AUTO: 0.04 K/UL — SIGNIFICANT CHANGE UP (ref 0–0.2)
BASOPHILS NFR BLD AUTO: 0.5 % — SIGNIFICANT CHANGE UP (ref 0–2)
BILIRUB SERPL-MCNC: <0.2 MG/DL — LOW (ref 0.4–2)
BUN SERPL-MCNC: 18.1 MG/DL — SIGNIFICANT CHANGE UP (ref 8–20)
CALCIUM SERPL-MCNC: 9.2 MG/DL — SIGNIFICANT CHANGE UP (ref 8.4–10.5)
CHLORIDE SERPL-SCNC: 100 MMOL/L — SIGNIFICANT CHANGE UP (ref 98–107)
CO2 SERPL-SCNC: 26 MMOL/L — SIGNIFICANT CHANGE UP (ref 22–29)
CREAT SERPL-MCNC: 1.24 MG/DL — SIGNIFICANT CHANGE UP (ref 0.5–1.3)
EGFR: 65 ML/MIN/1.73M2 — SIGNIFICANT CHANGE UP
EOSINOPHIL # BLD AUTO: 0.17 K/UL — SIGNIFICANT CHANGE UP (ref 0–0.5)
EOSINOPHIL NFR BLD AUTO: 2 % — SIGNIFICANT CHANGE UP (ref 0–6)
ERYTHROCYTE [SEDIMENTATION RATE] IN BLOOD: 50 MM/HR — HIGH (ref 0–20)
FLUAV AG NPH QL: SIGNIFICANT CHANGE UP
FLUBV AG NPH QL: SIGNIFICANT CHANGE UP
GLUCOSE SERPL-MCNC: 116 MG/DL — HIGH (ref 70–99)
HCT VFR BLD CALC: 35 % — LOW (ref 39–50)
HGB BLD-MCNC: 11 G/DL — LOW (ref 13–17)
IMM GRANULOCYTES NFR BLD AUTO: 1.8 % — HIGH (ref 0–1.5)
LYMPHOCYTES # BLD AUTO: 1.62 K/UL — SIGNIFICANT CHANGE UP (ref 1–3.3)
LYMPHOCYTES # BLD AUTO: 19.4 % — SIGNIFICANT CHANGE UP (ref 13–44)
MCHC RBC-ENTMCNC: 25.8 PG — LOW (ref 27–34)
MCHC RBC-ENTMCNC: 31.4 GM/DL — LOW (ref 32–36)
MCV RBC AUTO: 82 FL — SIGNIFICANT CHANGE UP (ref 80–100)
MONOCYTES # BLD AUTO: 1.01 K/UL — HIGH (ref 0–0.9)
MONOCYTES NFR BLD AUTO: 12.1 % — SIGNIFICANT CHANGE UP (ref 2–14)
NEUTROPHILS # BLD AUTO: 5.35 K/UL — SIGNIFICANT CHANGE UP (ref 1.8–7.4)
NEUTROPHILS NFR BLD AUTO: 64.2 % — SIGNIFICANT CHANGE UP (ref 43–77)
PLATELET # BLD AUTO: 338 K/UL — SIGNIFICANT CHANGE UP (ref 150–400)
POTASSIUM SERPL-MCNC: 4.1 MMOL/L — SIGNIFICANT CHANGE UP (ref 3.5–5.3)
POTASSIUM SERPL-SCNC: 4.1 MMOL/L — SIGNIFICANT CHANGE UP (ref 3.5–5.3)
PROT SERPL-MCNC: 7.4 G/DL — SIGNIFICANT CHANGE UP (ref 6.6–8.7)
RBC # BLD: 4.27 M/UL — SIGNIFICANT CHANGE UP (ref 4.2–5.8)
RBC # FLD: 15.4 % — HIGH (ref 10.3–14.5)
RSV RNA NPH QL NAA+NON-PROBE: SIGNIFICANT CHANGE UP
SARS-COV-2 RNA SPEC QL NAA+PROBE: SIGNIFICANT CHANGE UP
SODIUM SERPL-SCNC: 135 MMOL/L — SIGNIFICANT CHANGE UP (ref 135–145)
WBC # BLD: 8.34 K/UL — SIGNIFICANT CHANGE UP (ref 3.8–10.5)
WBC # FLD AUTO: 8.34 K/UL — SIGNIFICANT CHANGE UP (ref 3.8–10.5)

## 2022-08-16 PROCEDURE — 99223 1ST HOSP IP/OBS HIGH 75: CPT

## 2022-08-16 PROCEDURE — 70487 CT MAXILLOFACIAL W/DYE: CPT | Mod: 26,MB

## 2022-08-16 PROCEDURE — 70450 CT HEAD/BRAIN W/O DYE: CPT | Mod: 26,MB

## 2022-08-16 RX ORDER — KETOROLAC TROMETHAMINE 30 MG/ML
15 SYRINGE (ML) INJECTION ONCE
Refills: 0 | Status: DISCONTINUED | OUTPATIENT
Start: 2022-08-16 | End: 2022-08-16

## 2022-08-16 RX ORDER — VANCOMYCIN HCL 1 G
1000 VIAL (EA) INTRAVENOUS ONCE
Refills: 0 | Status: COMPLETED | OUTPATIENT
Start: 2022-08-16 | End: 2022-08-16

## 2022-08-16 RX ORDER — MEROPENEM 1 G/30ML
1000 INJECTION INTRAVENOUS EVERY 8 HOURS
Refills: 0 | Status: DISCONTINUED | OUTPATIENT
Start: 2022-08-16 | End: 2022-08-17

## 2022-08-16 RX ORDER — HYDROMORPHONE HYDROCHLORIDE 2 MG/ML
2 INJECTION INTRAMUSCULAR; INTRAVENOUS; SUBCUTANEOUS
Refills: 0 | Status: DISCONTINUED | OUTPATIENT
Start: 2022-08-16 | End: 2022-08-19

## 2022-08-16 RX ORDER — POLYETHYLENE GLYCOL 3350 17 G/17G
17 POWDER, FOR SOLUTION ORAL
Refills: 0 | Status: DISCONTINUED | OUTPATIENT
Start: 2022-08-16 | End: 2022-08-19

## 2022-08-16 RX ORDER — PANTOPRAZOLE SODIUM 20 MG/1
40 TABLET, DELAYED RELEASE ORAL
Refills: 0 | Status: DISCONTINUED | OUTPATIENT
Start: 2022-08-16 | End: 2022-08-19

## 2022-08-16 RX ORDER — ACETAMINOPHEN 500 MG
650 TABLET ORAL EVERY 6 HOURS
Refills: 0 | Status: DISCONTINUED | OUTPATIENT
Start: 2022-08-16 | End: 2022-08-19

## 2022-08-16 RX ORDER — ENOXAPARIN SODIUM 100 MG/ML
40 INJECTION SUBCUTANEOUS EVERY 24 HOURS
Refills: 0 | Status: DISCONTINUED | OUTPATIENT
Start: 2022-08-16 | End: 2022-08-19

## 2022-08-16 RX ORDER — MEROPENEM 1 G/30ML
1000 INJECTION INTRAVENOUS EVERY 8 HOURS
Refills: 0 | Status: DISCONTINUED | OUTPATIENT
Start: 2022-08-16 | End: 2022-08-16

## 2022-08-16 RX ORDER — CEFTRIAXONE 500 MG/1
2000 INJECTION, POWDER, FOR SOLUTION INTRAMUSCULAR; INTRAVENOUS ONCE
Refills: 0 | Status: COMPLETED | OUTPATIENT
Start: 2022-08-16 | End: 2022-08-16

## 2022-08-16 RX ORDER — MORPHINE SULFATE 50 MG/1
4 CAPSULE, EXTENDED RELEASE ORAL ONCE
Refills: 0 | Status: DISCONTINUED | OUTPATIENT
Start: 2022-08-16 | End: 2022-08-16

## 2022-08-16 RX ORDER — VANCOMYCIN HCL 1 G
1250 VIAL (EA) INTRAVENOUS EVERY 12 HOURS
Refills: 0 | Status: DISCONTINUED | OUTPATIENT
Start: 2022-08-16 | End: 2022-08-17

## 2022-08-16 RX ORDER — METHOCARBAMOL 500 MG/1
750 TABLET, FILM COATED ORAL THREE TIMES A DAY
Refills: 0 | Status: DISCONTINUED | OUTPATIENT
Start: 2022-08-16 | End: 2022-08-19

## 2022-08-16 RX ORDER — DEXAMETHASONE 0.5 MG/5ML
4 ELIXIR ORAL THREE TIMES A DAY
Refills: 0 | Status: DISCONTINUED | OUTPATIENT
Start: 2022-08-16 | End: 2022-08-19

## 2022-08-16 RX ORDER — SENNA PLUS 8.6 MG/1
2 TABLET ORAL AT BEDTIME
Refills: 0 | Status: DISCONTINUED | OUTPATIENT
Start: 2022-08-16 | End: 2022-08-19

## 2022-08-16 RX ORDER — BICALUTAMIDE 50 MG/1
50 TABLET, FILM COATED ORAL DAILY
Refills: 0 | Status: DISCONTINUED | OUTPATIENT
Start: 2022-08-16 | End: 2022-08-19

## 2022-08-16 RX ORDER — VANCOMYCIN HCL 1 G
2000 VIAL (EA) INTRAVENOUS EVERY 12 HOURS
Refills: 0 | Status: DISCONTINUED | OUTPATIENT
Start: 2022-08-16 | End: 2022-08-16

## 2022-08-16 RX ORDER — FENTANYL CITRATE 50 UG/ML
1 INJECTION INTRAVENOUS
Refills: 0 | Status: DISCONTINUED | OUTPATIENT
Start: 2022-08-16 | End: 2022-08-19

## 2022-08-16 RX ADMIN — MEROPENEM 100 MILLIGRAM(S): 1 INJECTION INTRAVENOUS at 22:04

## 2022-08-16 RX ADMIN — Medication 15 MILLIGRAM(S): at 01:34

## 2022-08-16 RX ADMIN — MORPHINE SULFATE 4 MILLIGRAM(S): 50 CAPSULE, EXTENDED RELEASE ORAL at 08:07

## 2022-08-16 RX ADMIN — HYDROMORPHONE HYDROCHLORIDE 2 MILLIGRAM(S): 2 INJECTION INTRAMUSCULAR; INTRAVENOUS; SUBCUTANEOUS at 19:42

## 2022-08-16 RX ADMIN — Medication 650 MILLIGRAM(S): at 17:57

## 2022-08-16 RX ADMIN — Medication 166.67 MILLIGRAM(S): at 17:27

## 2022-08-16 RX ADMIN — MORPHINE SULFATE 4 MILLIGRAM(S): 50 CAPSULE, EXTENDED RELEASE ORAL at 08:37

## 2022-08-16 RX ADMIN — Medication 4 MILLIGRAM(S): at 22:04

## 2022-08-16 RX ADMIN — CEFTRIAXONE 100 MILLIGRAM(S): 500 INJECTION, POWDER, FOR SOLUTION INTRAMUSCULAR; INTRAVENOUS at 08:08

## 2022-08-16 RX ADMIN — Medication 15 MILLIGRAM(S): at 02:00

## 2022-08-16 RX ADMIN — Medication 650 MILLIGRAM(S): at 17:27

## 2022-08-16 RX ADMIN — Medication 250 MILLIGRAM(S): at 09:57

## 2022-08-16 RX ADMIN — HYDROMORPHONE HYDROCHLORIDE 2 MILLIGRAM(S): 2 INJECTION INTRAMUSCULAR; INTRAVENOUS; SUBCUTANEOUS at 20:42

## 2022-08-16 RX ADMIN — SENNA PLUS 2 TABLET(S): 8.6 TABLET ORAL at 22:04

## 2022-08-16 RX ADMIN — ENOXAPARIN SODIUM 40 MILLIGRAM(S): 100 INJECTION SUBCUTANEOUS at 17:27

## 2022-08-16 NOTE — ED ADULT NURSE NOTE - CHIEF COMPLAINT QUOTE
pt c/o left jaw pain radiating to the neck  started a couple weeks ago +swelling, no difficulty swallowing, hx prostate CA and bone,

## 2022-08-16 NOTE — ED ADULT NURSE REASSESSMENT NOTE - NS ED NURSE REASSESS COMMENT FT1
pt status unchanged, refer to flowsheet and chart, pt safety maintained, pt hemodynamically stable. Pt c/o pain, MD Lewis made aware. Pt received Morphine 4mg IVP. Pt to receive dose of IV abx, ID consult pending. Pt  states he is hungry, breakfast tray ordered. Pt resting comfortably.

## 2022-08-16 NOTE — ED PROVIDER NOTE - NS ED ATTENDING STATEMENT MOD
This was a shared visit with the BHAVESH. I reviewed and verified the documentation and independently performed the documented:

## 2022-08-16 NOTE — H&P ADULT - HISTORY OF PRESENT ILLNESS
62 year old male with medical History significant for prostate cancer with bone mets presents with jaw pain that started on the left and then pain progressed to the chin and then ear and behind his eye and headache and is getting worse over past two weeks also associated with subjective fever and malaise. patient endorses getting multiple teeth extracted last year and not following up with his dentist after that , he is on hormone therapy and maintenance steroid.  today he has no CP no SOB no chills no nausea vomiting diarrhea   pain in the jaw is moderate to severe and not relieved or exacerbated with anything - CT IN ED showed Chronic Osteomyelitis of the jaw

## 2022-08-16 NOTE — ED PROVIDER NOTE - ATTENDING APP SHARED VISIT CONTRIBUTION OF CARE
61 yo M with hx of metastatic prostate  CA presents to ED with wife c/o increasing L sided jaw/facial swelling x last 2 weeks.  no assoc fever, chills, change in speech or difficulty swallowing.  On exam awake and alert in NAD, + L sided jaw/facial swelling, tongue and uvula m/l, no drooling, post pharynx clear.  will check labs, CT head and CT maxillofacial with IV contrast and re-eval

## 2022-08-16 NOTE — H&P ADULT - ASSESSMENT
62 year old male with medical History significant for prostate cancer with bone mets presents with jaw pain that started on the left and then pain progressed to the chin and then ear and behind his eye and headache and is getting worse over past two weeks     Osteomyelitis of the Jaw- started on Merrem and Vanco  pain control    Prostate cancer - discussed with oncology cont Abiraterone , Lupron and dexamethasone   Pain with bone mets- cont Fentanyl and hydromorphone and methocarbamol, bicalutamide   constipation - senna and Polyethylene glycol    Hypertension- home meds    GERD- Protonix    DVT PPX- Lovenox  62 year old male with medical History significant for prostate cancer with bone mets presents with jaw pain that started on the left and then pain progressed to the chin and then ear and behind his eye and headache and is getting worse over past two weeks     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

## 2022-08-16 NOTE — ED PROVIDER NOTE - PHYSICAL EXAMINATION
Const: Awake, alert and oriented. In no acute distress. Well appearing.  HEENT: NC/AT. Moist mucous membranes. earsL tms clear bilaterally, tender to palpation along left lower gum line/left mandible, no trismus no drooling tolerating secretions no dental abscess noted   Eyes: No scleral icterus. EOMI.  Neck:. Soft and supple. Full ROM without pain.  Cardiac: Regular rate and regular rhythm. +S1/S2. No murmurs. Peripheral pulses 2+ and symmetric. No LE edema.  Resp: Speaking in full sentences. No evidence of respiratory distress. No wheezes, rales or rhonchi.  Abd: Soft, non-tender, non-distended. Normal bowel sounds in all 4 quadrants. No guarding or rebound.  Back: Spine midline and non-tender. No CVAT.  Skin: No rashes, abrasions or lacerations.  Lymph: No cervical lymphadenopathy.  Neuro: Awake, alert & oriented x 3. Moves all extremities symmetrically.

## 2022-08-16 NOTE — CONSULT NOTE ADULT - SUBJECTIVE AND OBJECTIVE BOX
INFECTIOUS DISEASES AND INTERNAL MEDICINE at [place]  =======================================================  Dandy Jennings MD  Diplomates American Board of Internal Medicine and Infectious Diseases  Telephone [phone]  Fax            [phone]  =======================================================    CHRIS [id number]      HPI:  62 year old male with medical History significant for prostate cancer with bone mets presents with jaw pain that started on the left and then pain progressed to the chin and then ear and behind his eye and headache and is getting worse over past two weeks also associated with subjective fever and malaise. patient endorses getting multiple teeth extracted last year and not following up with his dentist after that , he is on hormone therapy and maintenance steroid.  today he has no CP no SOB no chills no nausea vomiting diarrhea   pain in the jaw is moderate to severe and not relieved or exacerbated with anything - CT IN ED showed  Osteomyelitis of the jaw    ASKED TO EVALUATE FROM ID STANDPOINT     PAST MEDICAL & SURGICAL HISTORY:  Prostate cancer      No significant past surgical history          ANTIBIOTICS  meropenem  IVPB 1000 milliGRAM(s) IV Intermittent every 8 hours  vancomycin  IVPB 1250 milliGRAM(s) IV Intermittent every 12 hours      Allergies    No Known Allergies    Intolerances        SOCIAL HISTORY:     FAMILY HX   FAMILY HISTORY:  No pertinent family history in first degree relatives        Vital Signs Last 24 Hrs  T(C): 36.8 (16 Aug 2022 11:09), Max: 37.1 (16 Aug 2022 00:03)  T(F): 98.3 (16 Aug 2022 11:09), Max: 98.7 (16 Aug 2022 00:03)  HR: 84 (16 Aug 2022 11:09) (70 - 84)  BP: 167/84 (16 Aug 2022 11:09) (158/94 - 167/84)  BP(mean): --  RR: 20 (16 Aug 2022 11:09) (18 - 20)  SpO2: 95% (16 Aug 2022 11:09) (95% - 99%)    Parameters below as of 16 Aug 2022 11:09  Patient On (Oxygen Delivery Method): room air      Drug Dosing Weight  Height (cm): 185.4 (16 Aug 2022 00:03)  Weight (kg): 95.3 (16 Aug 2022 00:03)  BMI (kg/m2): 27.7 (16 Aug 2022 00:03)  BSA (m2): 2.2 (16 Aug 2022 00:03)      REVIEW OF SYSTEMS:    CONSTITUTIONAL:  As per HPI.    HEENT:  Eyes:  No diplopia or blurred vision. ENT:  No earache, sore throat or runny nose.    CARDIOVASCULAR:  No pressure, squeezing, strangling, tightness, heaviness or aching about the chest, neck, axilla or epigastrium.    RESPIRATORY:  No cough, shortness of breath, PND or orthopnea.    GASTROINTESTINAL:  No nausea, vomiting or diarrhea.    GENITOURINARY:  No dysuria, frequency or urgency.    MUSCULOSKELETAL:  As per HPI.    SKIN:  No change in skin, hair or nails.    NEUROLOGIC:  No paresthesias, fasciculations, seizures or weakness.                  PHYSICAL EXAMINATION:    GENERAL: The patient is a _____in no apparent distress. ___     VITAL SIGNS: T(C): 36.8 (08-16-22 @ 11:09), Max: 37.1 (08-16-22 @ 00:03)  HR: 84 (08-16-22 @ 11:09) (70 - 84)  BP: 167/84 (08-16-22 @ 11:09) (158/94 - 167/84)  RR: 20 (08-16-22 @ 11:09) (18 - 20)  SpO2: 95% (08-16-22 @ 11:09) (95% - 99%)  Wt(kg): --    HEENT: Head is normocephalic and atraumatic.  ANICTERIC  NECK: Supple. No carotid bruits.  No lymphadenopathy or thyromegaly.    LUNGS:COARSE BREATH SOUNDS    HEART: Regular rate and rhythm without murmur.    ABDOMEN: Soft, nontender, and nondistended.  Positive bowel sounds.  No hepatosplenomegaly was noted. NO REBOUND NO GUARDING    EXTREMITIES: NO EDEMA NO ERYTHEMA    NEUROLOGIC: NON FOCAL      SKIN: No ulceration or induration present. NO RASH        BLOOD CULTURES       URINE CX          LABS:                        11.0   8.34  )-----------( 338      ( 16 Aug 2022 01:30 )             35.0     08-16    135  |  100  |  18.1  ----------------------------<  116<H>  4.1   |  26.0  |  1.24    Ca    9.2      16 Aug 2022 01:30    TPro  7.4  /  Alb  3.6  /  TBili  <0.2<L>  /  DBili  x   /  AST  11  /  ALT  8   /  AlkPhos  166<H>  08-16          RADIOLOGY & ADDITIONAL STUDIES:      ASSESSMENT/PLAN  62 year old male with medical History significant for prostate cancer with bone mets presents with jaw pain that started on the left and then pain progressed to the chin and then ear and behind his eye and headache and is getting worse over past two weeks also associated with subjective fever and malaise. patient endorses getting multiple teeth extracted last year and not following up with his dentist after that , he is on hormone therapy and maintenance steroid.  today he has no CP no SOB no chills no nausea vomiting diarrhea   pain in the jaw is moderate to severe and not relieved or exacerbated with anything - CT IN ED showed  Osteomyelitis of the jaw    WILL NEED ORAL SURGERY FOLLOWUP AS OUTPT AS NONE AVAILABLE   ENT EVAL NOTED  WILL FOLLOWUP BLOOD CX   WILL LIKELY NEED 6 WEEKS THERAPY WITH PICC LINE  WILL FOLLOW UP               MEIR THOMAS MD INFECTIOUS DISEASES AND INTERNAL MEDICINE at [place]  =======================================================  Dandy Jennings MD  Diplomates American Board of Internal Medicine and Infectious Diseases  Telephone [phone]  Fax            [phone]  =======================================================    CHRIS [id number]      HPI:  62 year old male with medical History significant for prostate cancer with bone mets presents with jaw pain that started on the left and then pain progressed to the chin and then ear and behind his eye and headache and is getting worse over past two weeks also associated with subjective fever and malaise. patient endorses getting multiple teeth extracted last year and not following up with his dentist after that , he is on hormone therapy and maintenance steroid.  today he has no CP no SOB no chills no nausea vomiting diarrhea   pain in the jaw is moderate to severe and not relieved or exacerbated with anything - CT IN ED showed  Osteomyelitis of the jaw    ASKED TO EVALUATE FROM ID STANDPOINT     PAST MEDICAL & SURGICAL HISTORY:  Prostate cancer      No significant past surgical history          ANTIBIOTICS  meropenem  IVPB 1000 milliGRAM(s) IV Intermittent every 8 hours  vancomycin  IVPB 1250 milliGRAM(s) IV Intermittent every 12 hours      Allergies    No Known Allergies    Intolerances        SOCIAL HISTORY:     FAMILY HX   FAMILY HISTORY:  No pertinent family history in first degree relatives        Vital Signs Last 24 Hrs  T(C): 36.8 (16 Aug 2022 11:09), Max: 37.1 (16 Aug 2022 00:03)  T(F): 98.3 (16 Aug 2022 11:09), Max: 98.7 (16 Aug 2022 00:03)  HR: 84 (16 Aug 2022 11:09) (70 - 84)  BP: 167/84 (16 Aug 2022 11:09) (158/94 - 167/84)  BP(mean): --  RR: 20 (16 Aug 2022 11:09) (18 - 20)  SpO2: 95% (16 Aug 2022 11:09) (95% - 99%)    Parameters below as of 16 Aug 2022 11:09  Patient On (Oxygen Delivery Method): room air      Drug Dosing Weight  Height (cm): 185.4 (16 Aug 2022 00:03)  Weight (kg): 95.3 (16 Aug 2022 00:03)  BMI (kg/m2): 27.7 (16 Aug 2022 00:03)  BSA (m2): 2.2 (16 Aug 2022 00:03)      REVIEW OF SYSTEMS:    CONSTITUTIONAL:  As per HPI.    HEENT:  Eyes:  No diplopia or blurred vision. ENT:  left  JAW PAIN    CARDIOVASCULAR:  No pressure, squeezing, strangling, tightness, heaviness or aching about the chest, neck, axilla or epigastrium.    RESPIRATORY:  No cough, shortness of breath, PND or orthopnea.    GASTROINTESTINAL:  No nausea, vomiting or diarrhea.    GENITOURINARY:  No dysuria, frequency or urgency.    MUSCULOSKELETAL:  As per HPI.    SKIN:  No change in skin, hair or nails.    NEUROLOGIC:  No paresthesias, fasciculations, seizures or weakness.                  PHYSICAL EXAMINATION:    GENERAL: The patient is a _____in no apparent distress. ___     VITAL SIGNS: T(C): 36.8 (08-16-22 @ 11:09), Max: 37.1 (08-16-22 @ 00:03)  HR: 84 (08-16-22 @ 11:09) (70 - 84)  BP: 167/84 (08-16-22 @ 11:09) (158/94 - 167/84)  RR: 20 (08-16-22 @ 11:09) (18 - 20)  SpO2: 95% (08-16-22 @ 11:09) (95% - 99%)  Wt(kg): --    HEENT: Head is normocephalic and atraumatic.  ANICTERIC LEFT JAW SWELLLING   NECK: Supple. No carotid bruits.  No lymphadenopathy or thyromegaly.    LUNGS:COARSE BREATH SOUNDS    HEART: Regular rate and rhythm without murmur.    ABDOMEN: Soft, nontender, and nondistended.  Positive bowel sounds.  No hepatosplenomegaly was noted. NO REBOUND NO GUARDING    EXTREMITIES: NO EDEMA NO ERYTHEMA    NEUROLOGIC: NON FOCAL      SKIN: No ulceration or induration present. NO RASH        BLOOD CULTURES       URINE CX          LABS:                        11.0   8.34  )-----------( 338      ( 16 Aug 2022 01:30 )             35.0     08-16    135  |  100  |  18.1  ----------------------------<  116<H>  4.1   |  26.0  |  1.24    Ca    9.2      16 Aug 2022 01:30    TPro  7.4  /  Alb  3.6  /  TBili  <0.2<L>  /  DBili  x   /  AST  11  /  ALT  8   /  AlkPhos  166<H>  08-16          RADIOLOGY & ADDITIONAL STUDIES:      ASSESSMENT/PLAN  62 year old male with medical History significant for prostate cancer with bone mets presents with jaw pain that started on the left and then pain progressed to the chin and then ear and behind his eye and headache and is getting worse over past two weeks also associated with subjective fever and malaise. patient endorses getting multiple teeth extracted last year and not following up with his dentist after that , he is on hormone therapy and maintenance steroid.  today he has no CP no SOB no chills no nausea vomiting diarrhea   pain in the jaw is moderate to severe and not relieved or exacerbated with anything - CT IN ED showed  Osteomyelitis of the jaw    WILL NEED ORAL SURGERY FOLLOWUP AS OUTPT AS NONE AVAILABLE   ENT EVAL NOTED  WILL FOLLOWUP BLOOD CX   WILL LIKELY NEED 6 WEEKS THERAPY WITH PICC LINE  WILL FOLLOW UP   WITH FURTHER WITH FURTHER REOCMMENDATIONS            MEIR THOMAS MD INFECTIOUS DISEASES AND INTERNAL MEDICINE at [place]  =======================================================  Dandy Jennings MD  Diplomates American Board of Internal Medicine and Infectious Diseases  Telephone [phone]  Fax            [phone]  =======================================================    CHRIS [id number]      HPI:  62 year old male with medical History significant for prostate cancer with bone mets presents with jaw pain that started on the left and then pain progressed to the chin and then ear and behind his eye and headache and is getting worse over past two weeks also associated with subjective fever and malaise. patient endorses getting multiple teeth extracted last year and not following up with his dentist after that , he is on hormone therapy and maintenance steroid.  today he has no CP no SOB no chills no nausea vomiting diarrhea   pain in the jaw is moderate to severe and not relieved or exacerbated with anything - CT IN ED showed  Osteomyelitis of the jaw    ASKED TO EVALUATE FROM ID STANDPOINT     PAST MEDICAL & SURGICAL HISTORY:  Prostate cancer      No significant past surgical history          ANTIBIOTICS  meropenem  IVPB 1000 milliGRAM(s) IV Intermittent every 8 hours  vancomycin  IVPB 1250 milliGRAM(s) IV Intermittent every 12 hours      Allergies    No Known Allergies    Intolerances        SOCIAL HISTORY:     FAMILY HX   FAMILY HISTORY:  No pertinent family history in first degree relatives        Vital Signs Last 24 Hrs  T(C): 36.8 (16 Aug 2022 11:09), Max: 37.1 (16 Aug 2022 00:03)  T(F): 98.3 (16 Aug 2022 11:09), Max: 98.7 (16 Aug 2022 00:03)  HR: 84 (16 Aug 2022 11:09) (70 - 84)  BP: 167/84 (16 Aug 2022 11:09) (158/94 - 167/84)  BP(mean): --  RR: 20 (16 Aug 2022 11:09) (18 - 20)  SpO2: 95% (16 Aug 2022 11:09) (95% - 99%)    Parameters below as of 16 Aug 2022 11:09  Patient On (Oxygen Delivery Method): room air      Drug Dosing Weight  Height (cm): 185.4 (16 Aug 2022 00:03)  Weight (kg): 95.3 (16 Aug 2022 00:03)  BMI (kg/m2): 27.7 (16 Aug 2022 00:03)  BSA (m2): 2.2 (16 Aug 2022 00:03)      REVIEW OF SYSTEMS:    CONSTITUTIONAL:  As per HPI.    HEENT:  Eyes:  No diplopia or blurred vision. ENT:  left  JAW PAIN    CARDIOVASCULAR:  No pressure, squeezing, strangling, tightness, heaviness or aching about the chest, neck, axilla or epigastrium.    RESPIRATORY:  No cough, shortness of breath, PND or orthopnea.    GASTROINTESTINAL:  No nausea, vomiting or diarrhea.    GENITOURINARY:  No dysuria, frequency or urgency.    MUSCULOSKELETAL:  As per HPI.    SKIN:  No change in skin, hair or nails.    NEUROLOGIC:  No paresthesias, fasciculations, seizures or weakness.                  PHYSICAL EXAMINATION:    GENERAL: The patient is a _____in no apparent distress. ___     VITAL SIGNS: T(C): 36.8 (08-16-22 @ 11:09), Max: 37.1 (08-16-22 @ 00:03)  HR: 84 (08-16-22 @ 11:09) (70 - 84)  BP: 167/84 (08-16-22 @ 11:09) (158/94 - 167/84)  RR: 20 (08-16-22 @ 11:09) (18 - 20)  SpO2: 95% (08-16-22 @ 11:09) (95% - 99%)  Wt(kg): --    HEENT: Head is normocephalic and atraumatic.  ANICTERIC LEFT JAW SWELLLING   NECK: Supple. No carotid bruits.  No lymphadenopathy or thyromegaly.    LUNGS:COARSE BREATH SOUNDS    HEART: Regular rate and rhythm without murmur.    ABDOMEN: Soft, nontender, and nondistended.  Positive bowel sounds.  No hepatosplenomegaly was noted. NO REBOUND NO GUARDING    EXTREMITIES: NO EDEMA NO ERYTHEMA    NEUROLOGIC: NON FOCAL      SKIN: No ulceration or induration present. NO RASH        BLOOD CULTURES       URINE CX          LABS:                        11.0   8.34  )-----------( 338      ( 16 Aug 2022 01:30 )             35.0     08-16    135  |  100  |  18.1  ----------------------------<  116<H>  4.1   |  26.0  |  1.24    Ca    9.2      16 Aug 2022 01:30    TPro  7.4  /  Alb  3.6  /  TBili  <0.2<L>  /  DBili  x   /  AST  11  /  ALT  8   /  AlkPhos  166<H>  08-16          RADIOLOGY & ADDITIONAL STUDIES:      ASSESSMENT/PLAN  62 year old male with medical History significant for prostate cancer with bone mets presents with jaw pain that started on the left and then pain progressed to the chin and then ear and behind his eye and headache and is getting worse over past two weeks also associated with subjective fever and malaise. patient endorses getting multiple teeth extracted last year and not following up with his dentist after that , he is on hormone therapy and maintenance steroid.  today he has no CP no SOB no chills no nausea vomiting diarrhea   pain in the jaw is moderate to severe and not relieved or exacerbated with anything - CT IN ED showed  Osteomyelitis of the jaw    WILL NEED ORAL SURGERY FOLLOWUP AS OUTPT AS NONE AVAILABLE  IF PT ON BISPHOSPHONATES COULD BE OSTEONECROSIS  ALSO WITH MALIGNANCY CONCERN FOR METS WILL NEED TO D/W ENT      WILL FOLLOWUP BLOOD CX   WILL LIKELY NEED 6 WEEKS THERAPY WITH PICC LINE  WILL FOLLOW UP   WITH FURTHER WITH FURTHER REOCMMENDATIONS            MEIR THOMAS MD

## 2022-08-16 NOTE — ED ADULT TRIAGE NOTE - CHIEF COMPLAINT QUOTE
pt c/o left jaw pain radiating to the neck  started a couple weeks ago +swelling, hx prostate CA and bone, pt c/o left jaw pain radiating to the neck  started a couple weeks ago +swelling, no difficulty swallowing, hx prostate CA and bone,

## 2022-08-16 NOTE — ED PROVIDER NOTE - OBJECTIVE STATEMENT
pt is a 61 y/o male with a pmhx of metastatic prostate CA presenting to the ed for evaluation of left jaw/facial swelling for the past two weeks. pt is currently on oral chemo therapy   pt denies injuries or trauma to the area  pt denies cp sob fever nausea vomiting dysphagia ear pain sore throat abd pan diarrhea back pain dysuria

## 2022-08-16 NOTE — CONSULT NOTE ADULT - SUBJECTIVE AND OBJECTIVE BOX
CC: L jaw swelling and pain    HPI: 61 yo M with hx of metastatic prostate  CA presents to ED with wife c/o increasing L sided jaw/facial swelling x last 2 weeks.  Pt currently on oral chemo therapy. Had radiation to below the clavicles to the pelvis. No assoc fever, chills, change in speech or difficulty swallowing. CT head and CT maxillofacial with IV contrast as below.     Pt with hx of dental extractions of mandilbular dentition. Has not been back since. Admits to pain at the gumline of remainder of mandibular teeth      PAST MEDICAL & SURGICAL HISTORY:  Prostate cancer      No significant past surgical history        Allergies    No Known Allergies    Intolerances      MEDICATIONS  (STANDING):  cefTRIAXone   IVPB 2000 milliGRAM(s) IV Intermittent once  morphine  - Injectable 4 milliGRAM(s) IV Push Once  vancomycin  IVPB. 1000 milliGRAM(s) IV Intermittent once    MEDICATIONS  (PRN):      Social History: No reported toxic habits    Family history:   No pertinent family history in first degree relatives        ROS:   ENT: all negative except as noted in HPI   Skin: No itching, dryness, rash, changes to hair, or skin masses  CV: denies palpitations  Pulm: denies SOB, cough, hemoptysis  GI: denies change in appetite, indigestion, n/v  : denies pertinent urinary symptoms, urgency  Neuro: denies numbness/tingling, loss of sensation  Psych: denies anxiety  MS: denies muscle weakness, instability  Heme: denies easy bruising or bleeding  Endo: denies heat/cold intolerance, excessive sweating  Vascular: denies LE edema    Vital Signs Last 24 Hrs  T(C): 37 (16 Aug 2022 07:43), Max: 37.1 (16 Aug 2022 00:03)  T(F): 98.6 (16 Aug 2022 07:43), Max: 98.7 (16 Aug 2022 00:03)  HR: 78 (16 Aug 2022 07:43) (70 - 84)  BP: 158/94 (16 Aug 2022 07:43) (158/94 - 161/93)  BP(mean): --  RR: 20 (16 Aug 2022 07:43) (18 - 20)  SpO2: 96% (16 Aug 2022 07:43) (96% - 99%)    Parameters below as of 16 Aug 2022 07:43  Patient On (Oxygen Delivery Method): room air                              11.0   8.34  )-----------( 338      ( 16 Aug 2022 01:30 )             35.0    08-16    135  |  100  |  18.1  ----------------------------<  116<H>  4.1   |  26.0  |  1.24    Ca    9.2      16 Aug 2022 01:30    TPro  7.4  /  Alb  3.6  /  TBili  <0.2<L>  /  DBili  x   /  AST  11  /  ALT  8   /  AlkPhos  166<H>  08-16       PHYSICAL EXAM:  Gen: NAD  Skin: No rashes, bruises, or lesions  Head: Normocephalic, Atraumatic  Face: mild diffuse soft tissue swelling of the mandible L>R  Eyes: no scleral injection  Nose: Nares bilaterally patent, no discharge  Mouth: No Stridor / Drooling / Trismus.  Mucosa moist, tongue/uvula midline, no oropharyngeal edema, 6 native mandibular dentition with plaque, tender to palpation along gum line  Neck: Flat, supple, trachea midline, no masses, no submandibular gland swelling  Lymphatic: diffuse adenopathy L >R  Resp: breathing easily, no stridor  CV: no peripheral edema/cyanosis  GI: nondistended   Peripheral vascular: no JVD or edema  Neuro: facial nerve intact, no facial droop        IMAGING/ADDITIONAL STUDIES:   < from: CT Maxillofacial w/ IV Cont (08.16.22 @ 06:43) >  ACC: [id number] EXAM:  CT MAXILLOFACIAL  IC                        ACC: [id number] EXAM:  CT BRAIN                          PROCEDURE DATE:  08/16/2022          INTERPRETATION:  CT Brain without contrast.  CT Facial Bones with contrast.    CLINICAL INFORMATION:  headache  left sided jaw pain swelling      TECHNIQUE: Acquisition through the brain without contrast and facial   bones with contrast. Coronal and sagittal reformations were reviewed.    70 cc Omnipaque 350 administered. 10 cc discarded.    COMPARISON:  None available.    FINDINGS:    Brain:    No acute intracranial abnormalities.    No mass effect or edema.    No evidence of acute cortical infarction or hemorrhage.    No sinusitis.    No skull fracture.    Facial Bones:    Diffuse sclerotic changes involving the anterior aspect of the mandibular   body and adjacent maxilla. Destructive changes involving anterior aspect   of the left side of the mandible in the region of the left canine,   anterior and posterior premolars as seen on axial image 61 series 3.   Destructive changes involving the maxilla most pronounced at the anterior   aspect of the left-sided maxilla axial image 109 series 3/sagittal image   80 series 8. Moderately extensive fluid adjacent to the anterior aspect  of the left-sided mandible and surrounding the maxilla. Findings   consistent with extensive chronic osteomyelitis with soft tissue   sequestrum adjacent to the mandible and surrounding the maxilla.    Thickening of the cervical fascia bilaterally.    Moderately extensive adenopathy within the submandibular regions, jugular   chain and posterior cervical regions, left greater than right.    No evidence of orbital or other facial fracture.    Moderate mucosal thickening involving left maxillary sinus. There are no   acute air-fluid levels.    Advanced hypertrophic degenerative changes cervical spine.    No other acute findings.    IMPRESSION:    No acute intracranial abnormality.    Findings consistent with extensive chronic osteomyelitis with soft tissue   sequestrum adjacent to the mandible and surrounding the maxilla as   described.

## 2022-08-16 NOTE — ED ADULT NURSE NOTE - OBJECTIVE STATEMENT
Pt presents to ED c/o left sided jaw pain and swelling. Pt reports pain began about 2 weeks ago, denies any recent dental work. Pt c/o numbness and tingling in his jaw, denies fever, chills, chest pain or SOB. Respirations are even and unlabored. Pt awaiting CT at this time. Educated on plan of care and expresses understanding.

## 2022-08-16 NOTE — H&P ADULT - NSHPPHYSICALEXAM_GEN_ALL_CORE
GENERAL: NAD,  HEAD:  Atraumatic, swelling left side jaw some what swollen compared to right till the angle and the chin  EYES: EOMI, PERRLA, conjunctiva and sclera clear  ENMT: has difficulty opening mouth wide , Moist mucous membranes  NECK: Supple, No JVD, Normal thyroid  NERVOUS SYSTEM:  Alert & Oriented X3,  Motor Strength 5/5 B/L upper and lower extremities;  CHEST/LUNG: Clear bilaterally; No rales, rhonchi, wheezing, or rubs  HEART: Regular rate and rhythm; No murmurs, rubs, or gallops  ABDOMEN: Soft, Nontender, Nondistended; Bowel sounds present  EXTREMITIES:  2+ Peripheral Pulses, No clubbing, cyanosis, or edema  LYMPH: No lymphadenopathy noted  SKIN: No rashes or lesions

## 2022-08-16 NOTE — CONSULT NOTE ADULT - PROBLEM SELECTOR RECOMMENDATION 9
-No surgical intervention necessary at this time  -Agree w IV abx, ID consult for duration may need PICC  -Cont steroids  -Pt advised to follow up with dentist/OMFS  -Pain control   -May eat -No surgical intervention necessary at this time  -Agree w IV abx, ID consult for duration may need PICC  -Cont steroids  -Pain control   -May eat -No surgical intervention necessary at this time, imaging to be reviewed by head and neck attending. Surgical planning pending response to medical tx   -Agree w IV abx, ID consult for duration may need PICC  -Cont steroids  -Pain control   -May eat

## 2022-08-17 ENCOUNTER — APPOINTMENT (OUTPATIENT)
Age: 62
End: 2022-08-17

## 2022-08-17 ENCOUNTER — APPOINTMENT (OUTPATIENT)
Dept: HEMATOLOGY ONCOLOGY | Facility: CLINIC | Age: 62
End: 2022-08-17

## 2022-08-17 LAB
HCV AB S/CO SERPL IA: 0.1 S/CO — SIGNIFICANT CHANGE UP (ref 0–0.99)
HCV AB SERPL-IMP: SIGNIFICANT CHANGE UP
VANCOMYCIN TROUGH SERPL-MCNC: 17.4 UG/ML — SIGNIFICANT CHANGE UP (ref 10–20)

## 2022-08-17 PROCEDURE — 99233 SBSQ HOSP IP/OBS HIGH 50: CPT

## 2022-08-17 PROCEDURE — 99231 SBSQ HOSP IP/OBS SF/LOW 25: CPT

## 2022-08-17 RX ORDER — ERTAPENEM SODIUM 1 G/1
1000 INJECTION, POWDER, LYOPHILIZED, FOR SOLUTION INTRAMUSCULAR; INTRAVENOUS EVERY 24 HOURS
Refills: 0 | Status: DISCONTINUED | OUTPATIENT
Start: 2022-08-17 | End: 2022-08-19

## 2022-08-17 RX ADMIN — ERTAPENEM SODIUM 120 MILLIGRAM(S): 1 INJECTION, POWDER, LYOPHILIZED, FOR SOLUTION INTRAMUSCULAR; INTRAVENOUS at 14:01

## 2022-08-17 RX ADMIN — FENTANYL CITRATE 1 PATCH: 50 INJECTION INTRAVENOUS at 10:00

## 2022-08-17 RX ADMIN — Medication 4 MILLIGRAM(S): at 21:18

## 2022-08-17 RX ADMIN — FENTANYL CITRATE 1 PATCH: 50 INJECTION INTRAVENOUS at 19:00

## 2022-08-17 RX ADMIN — FENTANYL CITRATE 1 PATCH: 50 INJECTION INTRAVENOUS at 06:22

## 2022-08-17 RX ADMIN — BICALUTAMIDE 50 MILLIGRAM(S): 50 TABLET, FILM COATED ORAL at 14:00

## 2022-08-17 RX ADMIN — SENNA PLUS 2 TABLET(S): 8.6 TABLET ORAL at 21:18

## 2022-08-17 RX ADMIN — MEROPENEM 100 MILLIGRAM(S): 1 INJECTION INTRAVENOUS at 06:25

## 2022-08-17 RX ADMIN — Medication 4 MILLIGRAM(S): at 14:00

## 2022-08-17 RX ADMIN — Medication 4 MILLIGRAM(S): at 06:26

## 2022-08-17 RX ADMIN — ENOXAPARIN SODIUM 40 MILLIGRAM(S): 100 INJECTION SUBCUTANEOUS at 16:36

## 2022-08-17 RX ADMIN — Medication 166.67 MILLIGRAM(S): at 08:11

## 2022-08-17 RX ADMIN — PANTOPRAZOLE SODIUM 40 MILLIGRAM(S): 20 TABLET, DELAYED RELEASE ORAL at 06:27

## 2022-08-17 NOTE — PROGRESS NOTE ADULT - ASSESSMENT
62 year old male with medical History significant for prostate cancer with bone mets presents with jaw pain that started on the left and then pain progressed to the chin and then ear and behind his eye and headache and is getting worse over past two weeks     CT shows chronic Osteomyelitis of the Jaw- started on Merrem and Vanco for now   ID recs appreciated, f/u cultures  differential- could be Osteonecrosis of the Jaw patient was on Xgeva- and changed recently Zometa due to insurance issues   pain control    Prostate cancer - discussed with oncology cont Abiraterone , Lupron and dexamethasone   Pain with bone mets- cont Fentanyl and hydromorphone and methocarbamol, bicalutamide   constipation - senna and Polyethylene glycol    Hypertension- home meds    GERD- Protonix    DVT PPX- Lovenox

## 2022-08-17 NOTE — CONSULT NOTE ADULT - PROBLEM SELECTOR RECOMMENDATION 9
-Pt high risk for open bx, recommend image guided needle biopsy of bone  -Cont w abx per ID  -Pain control

## 2022-08-17 NOTE — CONSULT NOTE ADULT - SUBJECTIVE AND OBJECTIVE BOX
CC: jaw pain    HPI: 63 yo male being txd for prostate CA since 2019 on oral chemo, on bisphosphonates. Has had jaw pain >1 year. Had teeth extracted last year with continued pain since.     Pts pain controlled. No issues eating or changes in voice. Currently on vanco and meropenem w ID on board.       PAST MEDICAL & SURGICAL HISTORY:  Prostate cancer      No significant past surgical history        Allergies    No Known Allergies    Intolerances      MEDICATIONS  (STANDING):  bicalutamide 50 milliGRAM(s) Oral daily  dexAMETHasone     Tablet 4 milliGRAM(s) Oral three times a day  enoxaparin Injectable 40 milliGRAM(s) SubCutaneous every 24 hours  fentaNYL   Patch  50 MICROgram(s)/Hr 1 Patch Transdermal every 72 hours  meropenem  IVPB 1000 milliGRAM(s) IV Intermittent every 8 hours  pantoprazole    Tablet 40 milliGRAM(s) Oral before breakfast  senna 2 Tablet(s) Oral at bedtime  vancomycin  IVPB 1250 milliGRAM(s) IV Intermittent every 12 hours    MEDICATIONS  (PRN):  acetaminophen     Tablet .. 650 milliGRAM(s) Oral every 6 hours PRN Mild Pain (1 - 3)  HYDROmorphone   Tablet 2 milliGRAM(s) Oral four times a day PRN Moderate Pain (4 - 6)  methocarbamol 750 milliGRAM(s) Oral three times a day PRN Muscle Spasm  polyethylene glycol 3350 17 Gram(s) Oral two times a day PRN Constipation        ROS:   ENT: all negative except as noted in HPI   Skin: No itching, dryness, rash, changes to hair, or skin masses  CV: denies palpitations  Pulm: denies SOB, cough, hemoptysis  GI: denies change in apetite, indigestion, n/v  : denies pertinent urinary symptoms, urgency  Neuro: denies numbness/tingling, loss of sensation  Psych: denies anxiety  MS: denies muscle weakness, instability  Heme: denies easy bruising or bleeding  Endo: denies heat/cold intolerance, excessive sweating  Vascular: denies LE edema    Vital Signs Last 24 Hrs  T(C): 36.7 (17 Aug 2022 04:12), Max: 37.2 (16 Aug 2022 15:46)  T(F): 98.1 (17 Aug 2022 04:12), Max: 98.9 (16 Aug 2022 15:46)  HR: 77 (17 Aug 2022 04:12) (77 - 85)  BP: 156/99 (17 Aug 2022 04:12) (156/99 - 170/87)  BP(mean): --  RR: 18 (17 Aug 2022 04:12) (18 - 20)  SpO2: 99% (17 Aug 2022 04:12) (95% - 99%)    Parameters below as of 17 Aug 2022 04:12  Patient On (Oxygen Delivery Method): room air                              11.0   8.34  )-----------( 338      ( 16 Aug 2022 01:30 )             35.0    08-16    135  |  100  |  18.1  ----------------------------<  116<H>  4.1   |  26.0  |  1.24    Ca    9.2      16 Aug 2022 01:30    TPro  7.4  /  Alb  3.6  /  TBili  <0.2<L>  /  DBili  x   /  AST  11  /  ALT  8   /  AlkPhos  166<H>  08-16         PHYSICAL EXAM:  Gen: NAD  Skin: No rashes, bruises, or lesions  Head: Normocephalic, Atraumatic  Face: mild diffuse soft tissue swelling of the mandible L>R  Eyes: no scleral injection  Nose: Nares bilaterally patent, no discharge  Mouth: No Stridor / Drooling / Trismus.  Mucosa moist, tongue/uvula midline, no oropharyngeal edema, 6 native mandibular dentition with plaque, tender to palpation along gum line  Neck: Flat, supple, trachea midline, no masses, no submandibular gland swelling  Lymphatic: diffuse adenopathy L >R  Resp: breathing easily, no stridor  Neuro: facial nerve intact, no facial droop

## 2022-08-17 NOTE — PATIENT PROFILE ADULT - FALL HARM RISK - PATIENT NEEDS ASSISTANCE
GUS WILBURN 65y Female  MRN#: [id number]   Hospital Day: 10d    SUBJECTIVE  Patient is a 65y old Female who presents with a chief complaint of shortness of breath (01 Oct 2019 12:17)  Currently admitted to medicine with the primary diagnosis of Chest pain    INTERVAL HPI AND OVERNIGHT EVENTS:  Patient was examined and seen at bedside. This morning she is resting comfortably in bed and reports no issues or overnight events.    Patient was initially scheduled for PCI today with Dr. Mancia. However, cased was called off due to C. diff. CT surgery also now states that patient is stable and can tolerate surgical intervention. Case will be discussed tomorrow at cardiac rounds in the morning.    REVIEW OF SYMPTOMS:  CONSTITUTIONAL: No weakness, fevers or chills; No headaches  EYES: No visual changes, eye pain, or discharge  ENT: No vertigo; No ear pain or change in hearing; No sore throat or difficulty swallowing  NECK: No pain or stiffness  RESPIRATORY: No cough, wheezing, or hemoptysis; No shortness of breath  CARDIOVASCULAR: No chest pain or palpitations  GASTROINTESTINAL: No abdominal or epigastric pain; No nausea, vomiting, or hematemesis; No diarrhea or constipation; No melena or hematochezia  GENITOURINARY: No dysuria, frequency or hematuria  MUSCULOSKELETAL: No joint pain, no muscle pain, no weakness  NEUROLOGICAL: No numbness or weakness  SKIN: No itching or rashes      OBJECTIVE  PAST MEDICAL & SURGICAL HISTORY  Female bladder prolapse  Diabetes  History of repair of hip fracture    ALLERGIES:  No Known Allergies    MEDICATIONS:  STANDING MEDICATIONS  ALPRAZolam 0.5 milliGRAM(s) Oral two times a day  amiodarone    Tablet 200 milliGRAM(s) Oral two times a day  amiodarone    Tablet 400 milliGRAM(s) Oral three times a day  aspirin  chewable 81 milliGRAM(s) Oral daily  atorvastatin 80 milliGRAM(s) Oral daily  chlorhexidine 4% Liquid 1 Application(s) Topical <User Schedule>  dextrose 5%. 1000 milliLiter(s) IV Continuous <Continuous>  dextrose 50% Injectable 12.5 Gram(s) IV Push once  dextrose 50% Injectable 25 Gram(s) IV Push once  dextrose 50% Injectable 25 Gram(s) IV Push once  digoxin     Tablet 0.125 milliGRAM(s) Oral daily  enoxaparin Injectable 60 milliGRAM(s) SubCutaneous two times a day  insulin glargine Injectable (LANTUS) 15 Unit(s) SubCutaneous at bedtime  insulin lispro (HumaLOG) corrective regimen sliding scale   SubCutaneous three times a day before meals  insulin lispro Injectable (HumaLOG) 5 Unit(s) SubCutaneous three times a day before meals  lidocaine   Patch 2 Patch Transdermal daily  metoprolol tartrate 12.5 milliGRAM(s) Oral every 6 hours  ondansetron Injectable 4 milliGRAM(s) IV Push once  pantoprazole    Tablet 40 milliGRAM(s) Oral before breakfast  spironolactone 25 milliGRAM(s) Oral daily  vancomycin    Solution 125 milliGRAM(s) Oral every 6 hours    PRN MEDICATIONS  dextrose 40% Gel 15 Gram(s) Oral once PRN  glucagon  Injectable 1 milliGRAM(s) IntraMuscular once PRN      VITAL SIGNS: Last 24 Hours  T(C): 36.5 (01 Oct 2019 16:00), Max: 36.5 (01 Oct 2019 16:00)  T(F): 97.7 (01 Oct 2019 16:00), Max: 97.7 (01 Oct 2019 16:00)  HR: 74 (01 Oct 2019 16:00) (70 - 84)  BP: 106/71 (01 Oct 2019 16:00) (99/57 - 121/65)  BP(mean): 88 (01 Oct 2019 16:00) (71 - 98)  RR: 21 (01 Oct 2019 16:00) (19 - 33)  SpO2: 99% (01 Oct 2019 16:00) (94% - 99%)    LABS:                        10.7   7.60  )-----------( 408      ( 01 Oct 2019 05:15 )             34.4     10-01    144  |  106  |  32<H>  ----------------------------<  134<H>  3.7   |  24  |  1.0    Ca    9.2      01 Oct 2019 05:15  Phos  2.4     10-01  Mg     2.0     10-01    TPro  6.1  /  Alb  3.3<L>  /  TBili  0.3  /  DBili  x   /  AST  27  /  ALT  22  /  AlkPhos  122<H>  10-01    PT/INR - ( 01 Oct 2019 05:15 )   PT: 13.80 sec;   INR: 1.20 ratio         PTT - ( 01 Oct 2019 05:15 )  PTT:39.9 sec              RADIOLOGY:  < from: Xray Chest 1 View- PORTABLE-Routine (09.30.19 @ 05:41) >  INTERPRETATION:  Clinical History / Reason for exam: Congestive heart   failure.    Comparison : Chest radiograph September 29, 2019.    Technique/Positioning: Single frontal chest x-ray obtained..    Findings:    Support devices: None.    Cardiac/mediastinum/hilum: Unchanged.    Lung parenchyma/Pleura: Stable bilateral pleural effusion/opacities.    Skeleton/soft tissues: Unchanged.    Impression:      Stablebilateral pleural effusion/opacities.    < end of copied text >      PHYSICAL EXAM:  CONSTITUTIONAL: No acute distress, well-developed, well-groomed, AAOx3  HEAD: Atraumatic, normocephalic  EYES: EOM intact, PERRLA, conjunctiva and sclera clear  ENT: Supple, no masses, no thyromegaly, no bruits, no JVD; moist mucous membranes  PULMONARY: Clear to auscultation bilaterally; no wheezes, rales, or rhonchi  CARDIOVASCULAR: Regular rate and rhythm; no murmurs, rubs, or gallops  GASTROINTESTINAL: Soft, non-tender, non-distended; bowel sounds present  MUSCULOSKELETAL: 2+ peripheral pulses; no clubbing, no cyanosis, no edema  NEUROLOGY: non-focal  SKIN: No rashes or lesions; warm and dry    ASSESSMENT & PLAN  Patient is a 64yo female with PMHx of diabetes mellitus, paroxysmal atrial fibrillation, history of lower GI bleed and recently admitted for hip fracture who presented with acute hypoxic respiratory failure secondary to acute on chronic HFrEF s/p intubation. Found to have NSTEMI.    #Acute hypoxic respiratory failure s/p extubation, resolved  - Patient saturating well on room air  - Chest X-ray shows stable bilateral pleural effusions  - Repeat chest X-ray in AM    #NSTEMI s/p cath 9/27/2019  - Cardiology consult appreciated  - CT Surgery consult appreciated  - Heart failure consult appreciated  - CT Surgery consult appreciated  - Patient was initially scheduled today for cardiac cath. Patient however is now stable enough for possible CABG. Case will be discussed for possible surgical intervention tomorrow  - Continue with atorvastatin 80mg PO QD  - Continue with aspirin 81mg PO QD  - Continue with clopidogrel 75mg PO QD  - Continue with metoprolol 12.5mg PO Q6H  - Continue with captopril 6.25mg PO TID  - Hold Lovenox in AM prior to possible surgery  - Monitor inputs and outputs, renal function, and body weights  - Keep Mg>2.2 and K>4.4    #Acute on chronic HFrEF exacerbation, resolving  - Cardiology consult appreciated  - Heart failure consult appreciated  - Continue with atorvastatin 80mg PO QD  - Continue with aspirin 81mg PO QD  - Continue with clopidogrel 75mg PO QD  - Continue with spironolactone 25mg PO QD  - Continue with metoprolol 12.5mg PO Q6H  - Furosemide gtt and nitroglycerin gtt were discontinued on 9/29/2019  - Continue with captopril 6.25mg PO TID  - Monitor inputs and outputs, renal function, and body weights  - Keep Mg>2.2 and K>4.4  - Patient may require Lifevest prior to discharge based on cath results    #Low back pain, improving  - Continue with lidocaine patches PRN    #C. diff enterocolitis, resolving  - Patient endorses only one bowel movement overnight  - C. diff PCR positive on 9/25/2019  - Continue with vancomycin 125mg PO Q6H for 10 days (Day #7)    #Diabetic ketoacidosis, resolved  - Sugars are well controlled  - Continue with insulin glargine 15 units SQ QHS  - Continue with insulin lispro 5 units SQ TID with meals  - Continue with insulin sliding scale  - Monitor finger stick glucose and BMP    #Borderline sinus tachycardia, resolved  - Hemoglobin and hematocrit are stable  - VA Duplex: no evidence of DVT bilaterally  - Hold Lovenox for PCI tomorrow  - Will discuss need for anticoagulation after PCI    #Paroxysmal atrial fibrillation with RVR, resolved  - Patient is in normal sinus rhythm  - Cardiology consult appreciated  - Continue with amiodarone 200mg PO BID  - Continue with digoxin 0.125mg PO QD  - Start and continue metoprolol 12.5mg PO Q6H  - Digoxin level 2.2    #Possible urinary tract infection, resolved  - Patient has indwelling Castellon catheter  - Urine culture from 9/22/2019: no growth to date  - Discontinue cefepime per ID  - Urogynecology consult appreciated  - ID consult appreciated  - Castellon catheter replaced on 9/28/2019  - Urine culture 9/28/2019: normal linda    #Transaminitis, resolved  - Likely secondary to congestive hepatopathy  - No RUQ pain on exam  - LFTs within normal limits for past 2 days  - Monitor LFTs    #Anxiety  - Continue with Xanax 0.5mg PO Q12H    #Misc  - DVT Prophylaxis: Held prior to possible surgery  - Diet: Dysphagia 3 diet with thin liquids, DASH/TLC, consistent carbohydrates with evening snack afterwards; NPO after midnight  - GI Prophylaxis: Pantoprazole 40mg PO QD  - Activity: Increase as tolerated  - IV Fluids: Encourage PO intake  - Code Status: Full Code    Dispo: From home No assistance needed

## 2022-08-17 NOTE — CONSULT NOTE ADULT - ASSESSMENT
63 yo male being txd for prostate CA with jaw pain. Chronic nature of present issue explained to pt. Will need biopsy to further evaluate for mets vs. osteonecrosis given bisphosphonate tx
63 yo male active prostate CA on oral chemo with chronic osteomyelitis at the mandible with soft tissue swelling likely related to remainder of the native mandibular teeth, pt admits to pain, relatively poor dental hygiene. WBC WNL, afebrile. No drainable collection or submandibular gland involvement on CT

## 2022-08-17 NOTE — PATIENT PROFILE ADULT - FALL HARM RISK - UNIVERSAL INTERVENTIONS
Bed in lowest position, wheels locked, appropriate side rails in place/Call bell, personal items and telephone in reach/Instruct patient to call for assistance before getting out of bed or chair/Non-slip footwear when patient is out of bed/[place] to call system/Physically safe environment - no spills, clutter or unnecessary equipment/Purposeful Proactive Rounding/Room/bathroom lighting operational, light cord in reach

## 2022-08-17 NOTE — PROGRESS NOTE ADULT - ASSESSMENT
Discussed w patient chronic nature of issue at hand. Currently on abx w ID on board however pt on bisphosphonates, high risk for BRONJ will need bx to further evaluate for osteonecrosis or possible mets.

## 2022-08-18 LAB
ANION GAP SERPL CALC-SCNC: 10 MMOL/L — SIGNIFICANT CHANGE UP (ref 5–17)
BUN SERPL-MCNC: 21.4 MG/DL — HIGH (ref 8–20)
CALCIUM SERPL-MCNC: 9 MG/DL — SIGNIFICANT CHANGE UP (ref 8.4–10.5)
CHLORIDE SERPL-SCNC: 102 MMOL/L — SIGNIFICANT CHANGE UP (ref 98–107)
CO2 SERPL-SCNC: 26 MMOL/L — SIGNIFICANT CHANGE UP (ref 22–29)
CREAT SERPL-MCNC: 1.16 MG/DL — SIGNIFICANT CHANGE UP (ref 0.5–1.3)
EGFR: 71 ML/MIN/1.73M2 — SIGNIFICANT CHANGE UP
GLUCOSE SERPL-MCNC: 142 MG/DL — HIGH (ref 70–99)
HCT VFR BLD CALC: 32.3 % — LOW (ref 39–50)
HGB BLD-MCNC: 10.4 G/DL — LOW (ref 13–17)
MCHC RBC-ENTMCNC: 26.2 PG — LOW (ref 27–34)
MCHC RBC-ENTMCNC: 32.2 GM/DL — SIGNIFICANT CHANGE UP (ref 32–36)
MCV RBC AUTO: 81.4 FL — SIGNIFICANT CHANGE UP (ref 80–100)
PLATELET # BLD AUTO: 316 K/UL — SIGNIFICANT CHANGE UP (ref 150–400)
POTASSIUM SERPL-MCNC: 4.2 MMOL/L — SIGNIFICANT CHANGE UP (ref 3.5–5.3)
POTASSIUM SERPL-SCNC: 4.2 MMOL/L — SIGNIFICANT CHANGE UP (ref 3.5–5.3)
RBC # BLD: 3.97 M/UL — LOW (ref 4.2–5.8)
RBC # FLD: 15.6 % — HIGH (ref 10.3–14.5)
SODIUM SERPL-SCNC: 138 MMOL/L — SIGNIFICANT CHANGE UP (ref 135–145)
WBC # BLD: 10.1 K/UL — SIGNIFICANT CHANGE UP (ref 3.8–10.5)
WBC # FLD AUTO: 10.1 K/UL — SIGNIFICANT CHANGE UP (ref 3.8–10.5)

## 2022-08-18 PROCEDURE — 99233 SBSQ HOSP IP/OBS HIGH 50: CPT

## 2022-08-18 PROCEDURE — 76937 US GUIDE VASCULAR ACCESS: CPT | Mod: 26,59

## 2022-08-18 PROCEDURE — 71045 X-RAY EXAM CHEST 1 VIEW: CPT | Mod: 26

## 2022-08-18 PROCEDURE — 36569 INSJ PICC 5 YR+ W/O IMAGING: CPT

## 2022-08-18 PROCEDURE — 36569 INSJ PICC 5 YR+ W/O IMAGING: CPT | Mod: LT

## 2022-08-18 PROCEDURE — 99232 SBSQ HOSP IP/OBS MODERATE 35: CPT

## 2022-08-18 RX ORDER — CHLORHEXIDINE GLUCONATE 213 G/1000ML
1 SOLUTION TOPICAL
Refills: 0 | Status: DISCONTINUED | OUTPATIENT
Start: 2022-08-18 | End: 2022-08-19

## 2022-08-18 RX ORDER — ERTAPENEM SODIUM 1 G/1
1 INJECTION, POWDER, LYOPHILIZED, FOR SOLUTION INTRAMUSCULAR; INTRAVENOUS
Qty: 14 | Refills: 0
Start: 2022-08-18 | End: 2022-08-31

## 2022-08-18 RX ORDER — SODIUM CHLORIDE 9 MG/ML
10 INJECTION INTRAMUSCULAR; INTRAVENOUS; SUBCUTANEOUS
Refills: 0 | Status: DISCONTINUED | OUTPATIENT
Start: 2022-08-18 | End: 2022-08-19

## 2022-08-18 RX ADMIN — ERTAPENEM SODIUM 120 MILLIGRAM(S): 1 INJECTION, POWDER, LYOPHILIZED, FOR SOLUTION INTRAMUSCULAR; INTRAVENOUS at 14:09

## 2022-08-18 RX ADMIN — SENNA PLUS 2 TABLET(S): 8.6 TABLET ORAL at 21:59

## 2022-08-18 RX ADMIN — FENTANYL CITRATE 1 PATCH: 50 INJECTION INTRAVENOUS at 19:13

## 2022-08-18 RX ADMIN — FENTANYL CITRATE 1 PATCH: 50 INJECTION INTRAVENOUS at 07:07

## 2022-08-18 RX ADMIN — BICALUTAMIDE 50 MILLIGRAM(S): 50 TABLET, FILM COATED ORAL at 14:14

## 2022-08-18 RX ADMIN — Medication 4 MILLIGRAM(S): at 21:59

## 2022-08-18 RX ADMIN — Medication 4 MILLIGRAM(S): at 14:08

## 2022-08-18 RX ADMIN — PANTOPRAZOLE SODIUM 40 MILLIGRAM(S): 20 TABLET, DELAYED RELEASE ORAL at 05:19

## 2022-08-18 RX ADMIN — ENOXAPARIN SODIUM 40 MILLIGRAM(S): 100 INJECTION SUBCUTANEOUS at 18:07

## 2022-08-18 RX ADMIN — Medication 4 MILLIGRAM(S): at 05:19

## 2022-08-18 NOTE — PROGRESS NOTE ADULT - ASSESSMENT
61 yo male w hx prostate CA on bisphosphonates w jaw pain OM on CT likely underlying ON. WBC has been normal, has been afebrile. No pus/collection on exam. Mucosa uninterrruted

## 2022-08-18 NOTE — PROGRESS NOTE ADULT - ASSESSMENT
62 year old male with medical History significant for prostate cancer with bone mets presents with jaw pain that started on the left and then pain progressed to the chin and then ear and behind his eye and headache and is getting worse over past two weeks     CT shows chronic Osteomyelitis of the Jaw- ertapenem at home for 6weeks per ID recs appreciated,  cultures - NG  differential- could be Osteonecrosis of the Jaw patient was on Xgeva- and changed recently Zometa due to insurance issues   pain control    Prostate cancer - discussed with oncology cont Abiraterone , Lupron and dexamethasone   Pain with bone mets- cont Fentanyl and hydromorphone and methocarbamol, bicalutamide   constipation - senna and Polyethylene glycol    Hypertension- home meds    GERD- Protonix    DVT PPX- Lovenox     DC - home with home care once arranged.

## 2022-08-18 NOTE — PROGRESS NOTE ADULT - ASSESSMENT
62 year old male with medical History significant for prostate cancer with bone mets presents with jaw pain that started on the left and then pain progressed to the chin and then ear and behind his eye and headache and is getting worse over past two weeks also associated with subjective fever and malaise. patient endorses getting multiple teeth extracted last year and not following up with his dentist after that , he is on hormone therapy and maintenance steroid.  today he has no CP no SOB no chills no nausea vomiting diarrhea   pain in the jaw is moderate to severe and not relieved or exacerbated with anything - CT IN ED showed  Osteomyelitis of the jaw    WILL NEED ORAL SURGERY FOLLOWUP AS OUTPT AS NONE AVAILABLE  PT HAD GARRY ON  XGEVA   ADN CONCERN THIS COULD BE OSTEONECROSIS  ALSO WITH MALIGNANCY CONCERN FOR METS    D/W ENT  CONCERN  ABOUT DOING SURGICAL  PROCEDURE AT THIS POINT    ALTHOUGH THE JAW  CHANGES   PROBABLY MORE OSTEONECROSIS THEN OSTEOMYELITIS  BUT WILL RX IV INVANZ X  6 WEEKS   AND WILL FOLLOWUP  PICC LINE ORDERED PLACED    RX TO BE GOVEN TO   INVANZ  1 GM Q 24 THROUGH   9/27  WEEKLY CBC CMP SED RATE

## 2022-08-18 NOTE — PROGRESS NOTE ADULT - PROBLEM SELECTOR PLAN 1
-Discussed in detail with ID Dr. Chung and Dr. Benitez, plan for PICC placement for IV abx  -Plan for outpatient hyperbaric oxygen therapy  -Pt to follow up with OMFS  -All contacts for follow up given to pt, all questions answered  -Pain control  -Diet as tolerated
-Case discussed with ENT attendings  -Pt is too high risk for open bx, recommend image guided needle biopsy  -Pain control  -Abx per ID

## 2022-08-19 ENCOUNTER — TRANSCRIPTION ENCOUNTER (OUTPATIENT)
Age: 62
End: 2022-08-19

## 2022-08-19 VITALS
HEART RATE: 72 BPM | DIASTOLIC BLOOD PRESSURE: 86 MMHG | SYSTOLIC BLOOD PRESSURE: 158 MMHG | OXYGEN SATURATION: 96 % | RESPIRATION RATE: 18 BRPM | TEMPERATURE: 98 F

## 2022-08-19 PROCEDURE — 36415 COLL VENOUS BLD VENIPUNCTURE: CPT

## 2022-08-19 PROCEDURE — 87637 SARSCOV2&INF A&B&RSV AMP PRB: CPT

## 2022-08-19 PROCEDURE — 71045 X-RAY EXAM CHEST 1 VIEW: CPT

## 2022-08-19 PROCEDURE — 99232 SBSQ HOSP IP/OBS MODERATE 35: CPT

## 2022-08-19 PROCEDURE — 70487 CT MAXILLOFACIAL W/DYE: CPT | Mod: MB

## 2022-08-19 PROCEDURE — 80053 COMPREHEN METABOLIC PANEL: CPT

## 2022-08-19 PROCEDURE — 80202 ASSAY OF VANCOMYCIN: CPT

## 2022-08-19 PROCEDURE — 85027 COMPLETE CBC AUTOMATED: CPT

## 2022-08-19 PROCEDURE — 99239 HOSP IP/OBS DSCHRG MGMT >30: CPT

## 2022-08-19 PROCEDURE — 86803 HEPATITIS C AB TEST: CPT

## 2022-08-19 PROCEDURE — 80048 BASIC METABOLIC PNL TOTAL CA: CPT

## 2022-08-19 PROCEDURE — 99285 EMERGENCY DEPT VISIT HI MDM: CPT

## 2022-08-19 PROCEDURE — 96374 THER/PROPH/DIAG INJ IV PUSH: CPT

## 2022-08-19 PROCEDURE — 85025 COMPLETE CBC W/AUTO DIFF WBC: CPT

## 2022-08-19 PROCEDURE — 70450 CT HEAD/BRAIN W/O DYE: CPT | Mod: MB

## 2022-08-19 PROCEDURE — 87040 BLOOD CULTURE FOR BACTERIA: CPT

## 2022-08-19 PROCEDURE — 85652 RBC SED RATE AUTOMATED: CPT

## 2022-08-19 RX ADMIN — CHLORHEXIDINE GLUCONATE 1 APPLICATION(S): 213 SOLUTION TOPICAL at 05:34

## 2022-08-19 RX ADMIN — Medication 4 MILLIGRAM(S): at 05:33

## 2022-08-19 RX ADMIN — BICALUTAMIDE 50 MILLIGRAM(S): 50 TABLET, FILM COATED ORAL at 13:11

## 2022-08-19 RX ADMIN — FENTANYL CITRATE 1 PATCH: 50 INJECTION INTRAVENOUS at 07:45

## 2022-08-19 RX ADMIN — Medication 4 MILLIGRAM(S): at 13:11

## 2022-08-19 RX ADMIN — ERTAPENEM SODIUM 120 MILLIGRAM(S): 1 INJECTION, POWDER, LYOPHILIZED, FOR SOLUTION INTRAMUSCULAR; INTRAVENOUS at 13:11

## 2022-08-19 RX ADMIN — PANTOPRAZOLE SODIUM 40 MILLIGRAM(S): 20 TABLET, DELAYED RELEASE ORAL at 05:33

## 2022-08-19 NOTE — DISCHARGE NOTE PROVIDER - CARE PROVIDERS DIRECT ADDRESSES
[email or web address],DirectAddress_Unknown

## 2022-08-19 NOTE — DISCHARGE NOTE NURSING/CASE MANAGEMENT/SOCIAL WORK - NSDCCRTYPESERV_GEN_ALL_CORE_FT
Long Term IV Antibiotics Infusion. -Medication, supplies and nursing services-  / weekly Blood Work (CBC, cmp, sed-rate)/ PICC line Care

## 2022-08-19 NOTE — DISCHARGE NOTE PROVIDER - HOSPITAL COURSE
62 year old male with medical History significant for prostate cancer with bone mets presents with jaw pain that started on the left and then pain progressed to the chin and then ear and behind his eye and headache and was getting worse over past two weeks.  CT with findings consistent with extensive chronic osteomyelitis with soft tissue   sequestrum adjacent to the mandible and surrounding the maxilla.  ENT and ID following.  No I&D recommended at this time.  Blood cultures negative.  Patient treated with IV antibiotics.  Patient had PICC placed and will need Invanz until      CT shows chronic Osteomyelitis of the Jaw- ertapenem at home for 6weeks per ID recs appreciated,  cultures - NG  differential- could be Osteonecrosis of the Jaw patient was on Xgeva- and changed recently Zometa due to insurance issues   pain control    Prostate cancer - discussed with oncology cont Abiraterone , Lupron and dexamethasone   Pain with bone mets- cont Fentanyl and hydromorphone and methocarbamol, bicalutamide   constipation - senna and Polyethylene glycol    Hypertension- home meds    GERD- Protonix    DVT PPX- Lovenox     DC - home with home care once arranged. 62 year old male with medical History significant for prostate cancer with bone mets presents with jaw pain that started on the left and then pain progressed to the chin and then ear and behind his eye and headache and was getting worse over past two weeks.  CT with findings consistent with extensive chronic osteomyelitis with soft tissue   sequestrum adjacent to the mandible and surrounding the maxilla.  ENT and ID following.  No I&D recommended at this time.  Blood cultures negative.  Patient treated with IV antibiotics.  Patient had PICC placed and will need Invanz until 9/27/22.  Patient stable for discharge to home with home care.

## 2022-08-19 NOTE — DISCHARGE NOTE PROVIDER - ATTENDING DISCHARGE PHYSICAL EXAMINATION:
GENERAL: NAD, well-groomed  HEENT: PERRL, +EOMI. Left sided jaw - tenderness+ no obvious swelling or induration appreciated - however mild puffiness+  NEURO: AAOX3

## 2022-08-19 NOTE — PROGRESS NOTE ADULT - SUBJECTIVE AND OBJECTIVE BOX
C: jaw pain    HPI: 63 yo male being txd for prostate CA since 2019 on oral chemo, on bisphosphonates. Has had jaw pain >1 year. Had teeth extracted last year with continued pain since.     Pts pain controlled. No issues eating or changes in voice. Currently on vanco and meropenem w ID on board.       PAST MEDICAL & SURGICAL HISTORY:  Prostate cancer      No significant past surgical history        Allergies    No Known Allergies    Intolerances      MEDICATIONS  (STANDING):  bicalutamide 50 milliGRAM(s) Oral daily  dexAMETHasone     Tablet 4 milliGRAM(s) Oral three times a day  enoxaparin Injectable 40 milliGRAM(s) SubCutaneous every 24 hours  fentaNYL   Patch  50 MICROgram(s)/Hr 1 Patch Transdermal every 72 hours  meropenem  IVPB 1000 milliGRAM(s) IV Intermittent every 8 hours  pantoprazole    Tablet 40 milliGRAM(s) Oral before breakfast  senna 2 Tablet(s) Oral at bedtime  vancomycin  IVPB 1250 milliGRAM(s) IV Intermittent every 12 hours    MEDICATIONS  (PRN):  acetaminophen     Tablet .. 650 milliGRAM(s) Oral every 6 hours PRN Mild Pain (1 - 3)  HYDROmorphone   Tablet 2 milliGRAM(s) Oral four times a day PRN Moderate Pain (4 - 6)  methocarbamol 750 milliGRAM(s) Oral three times a day PRN Muscle Spasm  polyethylene glycol 3350 17 Gram(s) Oral two times a day PRN Constipation        ROS:   ENT: all negative except as noted in HPI   Skin: No itching, dryness, rash, changes to hair, or skin masses  CV: denies palpitations  Pulm: denies SOB, cough, hemoptysis  GI: denies change in appetite, indigestion, n/v  : denies pertinent urinary symptoms, urgency  Neuro: denies numbness/tingling, loss of sensation  Psych: denies anxiety  MS: denies muscle weakness, instability  Heme: denies easy bruising or bleeding  Endo: denies heat/cold intolerance, excessive sweating  Vascular: denies LE edema    Vital Signs Last 24 Hrs  T(C): 36.7 (17 Aug 2022 04:12), Max: 37.2 (16 Aug 2022 15:46)  T(F): 98.1 (17 Aug 2022 04:12), Max: 98.9 (16 Aug 2022 15:46)  HR: 77 (17 Aug 2022 04:12) (77 - 85)  BP: 156/99 (17 Aug 2022 04:12) (156/99 - 170/87)  BP(mean): --  RR: 18 (17 Aug 2022 04:12) (18 - 20)  SpO2: 99% (17 Aug 2022 04:12) (95% - 99%)    Parameters below as of 17 Aug 2022 04:12  Patient On (Oxygen Delivery Method): room air                              11.0   8.34  )-----------( 338      ( 16 Aug 2022 01:30 )             35.0    08-16    135  |  100  |  18.1  ----------------------------<  116<H>  4.1   |  26.0  |  1.24    Ca    9.2      16 Aug 2022 01:30    TPro  7.4  /  Alb  3.6  /  TBili  <0.2<L>  /  DBili  x   /  AST  11  /  ALT  8   /  AlkPhos  166<H>  08-16         PHYSICAL EXAM:  Gen: NAD  Skin: No rashes, bruises, or lesions  Head: Normocephalic, Atraumatic  Face: mild diffuse soft tissue swelling of the mandible L>R  Eyes: no scleral injection  Nose: Nares bilaterally patent, no discharge  Mouth: No Stridor / Drooling / Trismus.  Mucosa moist, tongue/uvula midline, no oropharyngeal edema, 6 native mandibular dentition with plaque, tender to palpation along gum line  Neck: Flat, supple, trachea midline, no masses, no submandibular gland swelling  Lymphatic: diffuse adenopathy L >R  Resp: breathing easily, no stridor  Neuro: facial nerve intact, no facial droop  
CHRIS QUINTANA    [id number]    62y      Male    CC: left sided jaw pain and swelling     INTERVAL HPI/OVERNIGHT EVENTS: no acute events     REVIEW OF SYSTEMS:    CONSTITUTIONAL: No fever, weight loss, or fatigue  RESPIRATORY: No cough, wheezing, No shortness of breath  CARDIOVASCULAR: No chest pain, palpitations  GASTROINTESTINAL: No abdominal or epigastric pain. No nausea, vomiting        Vital Signs Last 24 Hrs  T(C): 36.9 (17 Aug 2022 11:45), Max: 37.2 (16 Aug 2022 15:46)  T(F): 98.4 (17 Aug 2022 11:45), Max: 98.9 (16 Aug 2022 15:46)  HR: 92 (17 Aug 2022 11:45) (77 - 92)  BP: 137/86 (17 Aug 2022 11:45) (137/86 - 170/87)  BP(mean): --  RR: 18 (17 Aug 2022 11:45) (18 - 19)  SpO2: 95% (17 Aug 2022 11:45) (95% - 99%)    Parameters below as of 17 Aug 2022 11:45  Patient On (Oxygen Delivery Method): room air        PHYSICAL EXAM:    GENERAL: NAD, well-groomed  HEENT: PERRL, +EOMI. Left sided jaw - tenderness+ no obvious swelling or induration appreciated - however mild puffiness+  NECK: soft, Supple  CHEST/LUNG: Clear to percussion bilaterally; No wheezing  HEART: S1S2+, Regular rate and rhythm; No murmurs  ABDOMEN: Soft, Nontender, Nondistended; Bowel sounds present  EXTREMITIES:   No clubbing, cyanosis, or edema  SKIN: No rashes or lesions  NEURO: AAOX3, no focal deficits      LABS:                        11.0   8.34  )-----------( 338      ( 16 Aug 2022 01:30 )             35.0     08-16    135  |  100  |  18.1  ----------------------------<  116<H>  4.1   |  26.0  |  1.24    Ca    9.2      16 Aug 2022 01:30    TPro  7.4  /  Alb  3.6  /  TBili  <0.2<L>  /  DBili  x   /  AST  11  /  ALT  8   /  AlkPhos  166<H>  08-16            MEDICATIONS  (STANDING):  bicalutamide 50 milliGRAM(s) Oral daily  dexAMETHasone     Tablet 4 milliGRAM(s) Oral three times a day  enoxaparin Injectable 40 milliGRAM(s) SubCutaneous every 24 hours  fentaNYL   Patch  50 MICROgram(s)/Hr 1 Patch Transdermal every 72 hours  meropenem  IVPB 1000 milliGRAM(s) IV Intermittent every 8 hours  pantoprazole    Tablet 40 milliGRAM(s) Oral before breakfast  senna 2 Tablet(s) Oral at bedtime  vancomycin  IVPB 1250 milliGRAM(s) IV Intermittent every 12 hours    MEDICATIONS  (PRN):  acetaminophen     Tablet .. 650 milliGRAM(s) Oral every 6 hours PRN Mild Pain (1 - 3)  HYDROmorphone   Tablet 2 milliGRAM(s) Oral four times a day PRN Moderate Pain (4 - 6)  methocarbamol 750 milliGRAM(s) Oral three times a day PRN Muscle Spasm  polyethylene glycol 3350 17 Gram(s) Oral two times a day PRN Constipation      RADIOLOGY & ADDITIONAL TESTS:  
INFECTIOUS DISEASES AND INTERNAL MEDICINE at [facility]  =======================================================  Dandy Jennings MD  Diplomates American Board of Internal Medicine and Infectious Diseases  Telephone [phone]  Fax            [phone]  =======================================================    CHRIS QUINTANA [id number]    Follow up: LEFT JAW PAIN    Allergies:  No Known Allergies      Medications:  acetaminophen     Tablet .. 650 milliGRAM(s) Oral every 6 hours PRN  bicalutamide 50 milliGRAM(s) Oral daily  dexAMETHasone     Tablet 4 milliGRAM(s) Oral three times a day  enoxaparin Injectable 40 milliGRAM(s) SubCutaneous every 24 hours  fentaNYL   Patch  50 MICROgram(s)/Hr 1 Patch Transdermal every 72 hours  HYDROmorphone   Tablet 2 milliGRAM(s) Oral four times a day PRN  meropenem  IVPB 1000 milliGRAM(s) IV Intermittent every 8 hours  methocarbamol 750 milliGRAM(s) Oral three times a day PRN  pantoprazole    Tablet 40 milliGRAM(s) Oral before breakfast  polyethylene glycol 3350 17 Gram(s) Oral two times a day PRN  senna 2 Tablet(s) Oral at bedtime  vancomycin  IVPB 1250 milliGRAM(s) IV Intermittent every 12 hours    SOCIAL       FAMILY   FAMILY HISTORY:  No pertinent family history in first degree relatives      REVIEW OF SYSTEMS:  CONSTITUTIONAL:  No Fever or chills  HEENT:   No diplopia or blurred vision. JAW PAIN   CARDIOVASCULAR:  No pressure, squeezing, strangling, tightness, heaviness or aching about the chest, neck, axilla or epigastrium.  RESPIRATORY:  No cough, shortness of breath, PND or orthopnea.  GASTROINTESTINAL:  No nausea, vomiting or diarrhea.  GENITOURINARY:  No dysuria, frequency or urgency. No Blood in urine  MUSCULOSKELETAL:   moves all joints  SKIN:  No change in skin, hair or nails.  NEUROLOGIC:  No paresthesias, fasciculations, seizures or weakness.  PSYCHIATRIC:  No disorder of thought or mood.  ENDOCRINE:  No heat or cold intolerance, polyuria or polydipsia.  HEMATOLOGICAL:  No easy bruising or bleeding.            Physical Exam:  ICU Vital Signs Last 24 Hrs  T(C): 36.9 (17 Aug 2022 07:44), Max: 37.2 (16 Aug 2022 15:46)  T(F): 98.5 (17 Aug 2022 07:44), Max: 98.9 (16 Aug 2022 15:46)  HR: 82 (17 Aug 2022 07:44) (77 - 85)  BP: 139/86 (17 Aug 2022 07:44) (139/86 - 170/87)  BP(mean): --  ABP: --  ABP(mean): --  RR: 18 (17 Aug 2022 07:44) (18 - 20)  SpO2: 97% (17 Aug 2022 07:44) (95% - 99%)    O2 Parameters below as of 17 Aug 2022 07:44  Patient On (Oxygen Delivery Method): room air          GEN: NAD,   HEENT: normocephalic and atraumatic. EOMI. MARCELLE.   LEFT JAW TENDERNESS   NECK: Supple. No carotid bruits.  No lymphadenopathy or thyromegaly.  LUNGS: Clear to auscultation.  HEART: Regular rate and rhythm without murmur.  ABDOMEN: Soft, nontender, and nondistended.  Positive bowel sounds.    : No CVA tenderness  EXTREMITIES: Without any cyanosis, clubbing, rash, lesions or edema.  MSK: no joint swelling  NEUROLOGIC: Cranial nerves II through XII are grossly intact.  PSYCHIATRIC: Appropriate affect .  SKIN: No ulceration or induration present.        Labs:  Vitals:  ============  T(F): 98.5 (17 Aug 2022 07:44), Max: 98.9 (16 Aug 2022 15:46)  HR: 82 (17 Aug 2022 07:44)  BP: 139/86 (17 Aug 2022 07:44)  RR: 18 (17 Aug 2022 07:44)  SpO2: 97% (17 Aug 2022 07:44) (95% - 99%)  temp max in last 48H T(F): , Max: 98.9 (08-16-22 @ 15:46)    =======================================================  Current Antibiotics:  meropenem  IVPB 1000 milliGRAM(s) IV Intermittent every 8 hours  vancomycin  IVPB 1250 milliGRAM(s) IV Intermittent every 12 hours    Other medications:  bicalutamide 50 milliGRAM(s) Oral daily  dexAMETHasone     Tablet 4 milliGRAM(s) Oral three times a day  enoxaparin Injectable 40 milliGRAM(s) SubCutaneous every 24 hours  fentaNYL   Patch  50 MICROgram(s)/Hr 1 Patch Transdermal every 72 hours  pantoprazole    Tablet 40 milliGRAM(s) Oral before breakfast  senna 2 Tablet(s) Oral at bedtime      =======================================================  Labs:                        11.0   8.34  )-----------( 338      ( 16 Aug 2022 01:30 )             35.0     08-16    135  |  100  |  18.1  ----------------------------<  116<H>  4.1   |  26.0  |  1.24    Ca    9.2      16 Aug 2022 01:30    TPro  7.4  /  Alb  3.6  /  TBili  <0.2<L>  /  DBili  x   /  AST  11  /  ALT  8   /  AlkPhos  166<H>  08-16      Creatinine, Serum: 1.24 mg/dL (08-16-22 @ 01:30)            WBC Count: 8.34 K/uL (08-16-22 @ 01:30)    SARS-CoV-2 Result: NotDetec (08-16-22 @ 01:30)      Alkaline Phosphatase, Serum: 166 U/L (08-16-22 @ 01:30)  Alanine Aminotransferase (ALT/SGPT): 8 U/L (08-16-22 @ 01:30)  Aspartate Aminotransferase (AST/SGOT): 11 U/L (08-16-22 @ 01:30)  Bilirubin Total, Serum: <0.2 mg/dL (08-16-22 @ 01:30)  
INFECTIOUS DISEASES AND INTERNAL MEDICINE at [place]  =======================================================  Dandy Jennings MD  Diplomates American Board of Internal Medicine and Infectious Diseases  Telephone [phone]  Fax            [phone]  =======================================================    CHRIS QUINTANA [id number]    Follow up: LEFT JAW PAIN    Allergies:  No Known Allergies      Medications:  acetaminophen     Tablet .. 650 milliGRAM(s) Oral every 6 hours PRN  bicalutamide 50 milliGRAM(s) Oral daily  dexAMETHasone     Tablet 4 milliGRAM(s) Oral three times a day  enoxaparin Injectable 40 milliGRAM(s) SubCutaneous every 24 hours  fentaNYL   Patch  50 MICROgram(s)/Hr 1 Patch Transdermal every 72 hours  HYDROmorphone   Tablet 2 milliGRAM(s) Oral four times a day PRN  meropenem  IVPB 1000 milliGRAM(s) IV Intermittent every 8 hours  methocarbamol 750 milliGRAM(s) Oral three times a day PRN  pantoprazole    Tablet 40 milliGRAM(s) Oral before breakfast  polyethylene glycol 3350 17 Gram(s) Oral two times a day PRN  senna 2 Tablet(s) Oral at bedtime  vancomycin  IVPB 1250 milliGRAM(s) IV Intermittent every 12 hours    SOCIAL       FAMILY   FAMILY HISTORY:  No pertinent family history in first degree relatives      REVIEW OF SYSTEMS:  CONSTITUTIONAL:  No Fever or chills  HEENT:   No diplopia or blurred vision. JAW PAIN   CARDIOVASCULAR:  No pressure, squeezing, strangling, tightness, heaviness or aching about the chest, neck, axilla or epigastrium.  RESPIRATORY:  No cough, shortness of breath, PND or orthopnea.  GASTROINTESTINAL:  No nausea, vomiting or diarrhea.  GENITOURINARY:  No dysuria, frequency or urgency. No Blood in urine  MUSCULOSKELETAL:   moves all joints  SKIN:  No change in skin, hair or nails.  NEUROLOGIC:  No paresthesias, fasciculations, seizures or weakness.  PSYCHIATRIC:  No disorder of thought or mood.  ENDOCRINE:  No heat or cold intolerance, polyuria or polydipsia.  HEMATOLOGICAL:  No easy bruising or bleeding.            Physical Exam:  I Vital Signs Last 24 Hrs  T(C): 36.5 (18 Aug 2022 04:47), Max: 37 (17 Aug 2022 17:15)  T(F): 97.7 (18 Aug 2022 04:47), Max: 98.6 (17 Aug 2022 17:15)  HR: 74 (18 Aug 2022 04:47) (74 - 92)  BP: 143/85 (18 Aug 2022 04:47) (128/82 - 145/82)  BP(mean): --  RR: 19 (18 Aug 2022 04:47) (16 - 19)  SpO2: 97% (18 Aug 2022 04:47) (95% - 97%)    Parameters below as of 18 Aug 2022 04:47  Patient On (Oxygen Delivery Method): room air              GEN: NAD,   HEENT: normocephalic and atraumatic. EOMI. MARCELLE.   LEFT JAW TENDERNESS   NECK: Supple. No carotid bruits.  No lymphadenopathy or thyromegaly.  LUNGS: Clear to auscultation.  HEART: Regular rate and rhythm without murmur.  ABDOMEN: Soft, nontender, and nondistended.  Positive bowel sounds.    : No CVA tenderness  EXTREMITIES: Without any cyanosis, clubbing, rash, lesions or edema.  MSK: no joint swelling  NEUROLOGIC: Cranial nerves II through XII are grossly intact.  PSYCHIATRIC: Appropriate affect .  SKIN: No ulceration or induration present.        Labs:  Vitals:   meropenem  IVPB 1000 milliGRAM(s) IV Intermittent every 8 hours  vancomycin  IVPB 1250 milliGRAM(s) IV Intermittent every 12 hours    Other medications:  bicalutamide 50 milliGRAM(s) Oral daily  dexAMETHasone     Tablet 4 milliGRAM(s) Oral three times a day  enoxaparin Injectable 40 milliGRAM(s) SubCutaneous every 24 hours  fentaNYL   Patch  50 MICROgram(s)/Hr 1 Patch Transdermal every 72 hours  pantoprazole    Tablet 40 milliGRAM(s) Oral before breakfast  senna 2 Tablet(s) Oral at bedtime      =======================================================  Labs:                                  10.4   10.10 )-----------( 316      ( 18 Aug 2022 06:30 )             32.3   08-18    138  |  102  |  21.4<H>  ----------------------------<  142<H>  4.2   |  26.0  |  1.16    Ca    9.0      18 Aug 2022 06:30              WBC Count: 8.34 K/uL (08-16-22 @ 01:30)    SARS-CoV-2 Result: NotDetec (08-16-22 @ 01:30)      Alkaline Phosphatase, Serum: 166 U/L (08-16-22 @ 01:30)  Alanine Aminotransferase (ALT/SGPT): 8 U/L (08-16-22 @ 01:30)  Aspartate Aminotransferase (AST/SGOT): 11 U/L (08-16-22 @ 01:30)  Bilirubin Total, Serum: <0.2 mg/dL (08-16-22 @ 01:30)  
CHRIS QUINTANA    [id number]    62y      Male    CC: left sided jaw pain and swelling   no new complaints    INTERVAL HPI/OVERNIGHT EVENTS: no acute events     REVIEW OF SYSTEMS:    CONSTITUTIONAL: No fever, weight loss, or fatigue  RESPIRATORY: No cough, wheezing, No shortness of breath  CARDIOVASCULAR: No chest pain, palpitations  GASTROINTESTINAL: No abdominal or epigastric pain. No nausea, vomiting        Vital Signs Last 24 Hrs  T(C): 36.7 (18 Aug 2022 15:15), Max: 37 (17 Aug 2022 17:15)  T(F): 98.1 (18 Aug 2022 15:15), Max: 98.6 (17 Aug 2022 17:15)  HR: 70 (18 Aug 2022 15:15) (69 - 77)  BP: 158/87 (18 Aug 2022 15:15) (128/82 - 158/87)  BP(mean): --  RR: 18 (18 Aug 2022 15:15) (18 - 19)  SpO2: 96% (18 Aug 2022 15:15) (95% - 97%)    Parameters below as of 18 Aug 2022 15:15  Patient On (Oxygen Delivery Method): room air            PHYSICAL EXAM:    GENERAL: NAD, well-groomed  HEENT: PERRL, +EOMI. Left sided jaw - tenderness+ no obvious swelling or induration appreciated - however mild puffiness+  NECK: soft, Supple  CHEST/LUNG: Clear to percussion bilaterally; No wheezing  HEART: S1S2+, Regular rate and rhythm; No murmurs  ABDOMEN: Soft, Nontender, Nondistended; Bowel sounds present  EXTREMITIES:   No clubbing, cyanosis, or edema  SKIN: No rashes or lesions  NEURO: AAOX3, no focal deficits      LABS:                                   10.4   10.10 )-----------( 316      ( 18 Aug 2022 06:30 )             32.3   08-18    138  |  102  |  21.4<H>  ----------------------------<  142<H>  4.2   |  26.0  |  1.16    Ca    9.0      18 Aug 2022 06:30        MEDICATIONS  (STANDING):  bicalutamide 50 milliGRAM(s) Oral daily  chlorhexidine 2% Cloths 1 Application(s) Topical <User Schedule>  dexAMETHasone     Tablet 4 milliGRAM(s) Oral three times a day  enoxaparin Injectable 40 milliGRAM(s) SubCutaneous every 24 hours  ertapenem  IVPB 1000 milliGRAM(s) IV Intermittent every 24 hours  fentaNYL   Patch  50 MICROgram(s)/Hr 1 Patch Transdermal every 72 hours  pantoprazole    Tablet 40 milliGRAM(s) Oral before breakfast  senna 2 Tablet(s) Oral at bedtime    MEDICATIONS  (PRN):  acetaminophen     Tablet .. 650 milliGRAM(s) Oral every 6 hours PRN Mild Pain (1 - 3)  HYDROmorphone   Tablet 2 milliGRAM(s) Oral four times a day PRN Moderate Pain (4 - 6)  methocarbamol 750 milliGRAM(s) Oral three times a day PRN Muscle Spasm  polyethylene glycol 3350 17 Gram(s) Oral two times a day PRN Constipation  sodium chloride 0.9% lock flush 10 milliLiter(s) IV Push every 1 hour PRN Pre/post blood products, medications, blood draw, and to maintain line patency  
ENT ISSUE/POD: OM/ON of jaw    HPI: Feels well this am, no changes. Awaiting PICC placement    PAST MEDICAL & SURGICAL HISTORY:  Prostate cancer      No significant past surgical history        Allergies    No Known Allergies    Intolerances      MEDICATIONS  (STANDING):  bicalutamide 50 milliGRAM(s) Oral daily  dexAMETHasone     Tablet 4 milliGRAM(s) Oral three times a day  enoxaparin Injectable 40 milliGRAM(s) SubCutaneous every 24 hours  ertapenem  IVPB 1000 milliGRAM(s) IV Intermittent every 24 hours  fentaNYL   Patch  50 MICROgram(s)/Hr 1 Patch Transdermal every 72 hours  pantoprazole    Tablet 40 milliGRAM(s) Oral before breakfast  senna 2 Tablet(s) Oral at bedtime    MEDICATIONS  (PRN):  acetaminophen     Tablet .. 650 milliGRAM(s) Oral every 6 hours PRN Mild Pain (1 - 3)  HYDROmorphone   Tablet 2 milliGRAM(s) Oral four times a day PRN Moderate Pain (4 - 6)      ROS:   ENT: all negative except as noted in HPI   Pulm: denies SOB, cough, hemoptysis  Neuro: denies numbness/tingling, loss of sensation  Endo: denies heat/cold intolerance, excessive sweating      Vital Signs Last 24 Hrs  T(C): 36.7 (18 Aug 2022 08:00), Max: 37 (17 Aug 2022 17:15)  T(F): 98.1 (18 Aug 2022 08:00), Max: 98.6 (17 Aug 2022 17:15)  HR: 69 (18 Aug 2022 08:00) (69 - 92)  BP: 150/85 (18 Aug 2022 08:00) (128/82 - 150/85)  BP(mean): --  RR: 18 (18 Aug 2022 08:00) (16 - 19)  SpO2: 95% (18 Aug 2022 08:00) (95% - 97%)    Parameters below as of 18 Aug 2022 08:00  Patient On (Oxygen Delivery Method): room air                              10.4   10.10 )-----------( 316      ( 18 Aug 2022 06:30 )             32.3    08-18    138  |  102  |  21.4<H>  ----------------------------<  142<H>  4.2   |  26.0  |  1.16    Ca    9.0      18 Aug 2022 06:30         PHYSICAL EXAM:  Gen: NAD  Skin: No rashes, bruises, or lesions  Head: Normocephalic, Atraumatic  Face: L jaw tenderness  Eyes: no scleral injection  Nose: Nares bilaterally patent, no discharge  Mouth: No Stridor / Drooling / Trismus.  Mucosa moist, tongue/uvula midline, 7 native mandibular dentition poor  Neck: Flat, supple, trachea midline, no masses  Resp: breathing easily, no stridor  Neuro: facial nerve intact, no facial droop        
INFECTIOUS DISEASES AND INTERNAL MEDICINE at [place]  =======================================================  Dandy Jennings MD  Diplomates American Board of Internal Medicine and Infectious Diseases  Telephone [phone]  Fax            [phone]  =======================================================    CHRIS QUINTANA [id number]    Follow up: LEFT JAW PAIN    Allergies:  No Known Allergies      Medications:  acetaminophen     Tablet .. 650 milliGRAM(s) Oral every 6 hours PRN  bicalutamide 50 milliGRAM(s) Oral daily  dexAMETHasone     Tablet 4 milliGRAM(s) Oral three times a day  enoxaparin Injectable 40 milliGRAM(s) SubCutaneous every 24 hours  fentaNYL   Patch  50 MICROgram(s)/Hr 1 Patch Transdermal every 72 hours  HYDROmorphone   Tablet 2 milliGRAM(s) Oral four times a day PRN  meropenem  IVPB 1000 milliGRAM(s) IV Intermittent every 8 hours  methocarbamol 750 milliGRAM(s) Oral three times a day PRN  pantoprazole    Tablet 40 milliGRAM(s) Oral before breakfast  polyethylene glycol 3350 17 Gram(s) Oral two times a day PRN  senna 2 Tablet(s) Oral at bedtime  vancomycin  IVPB 1250 milliGRAM(s) IV Intermittent every 12 hours    SOCIAL       FAMILY   FAMILY HISTORY:  No pertinent family history in first degree relatives      REVIEW OF SYSTEMS:  CONSTITUTIONAL:  No Fever or chills  HEENT:   No diplopia or blurred vision. JAW PAIN   CARDIOVASCULAR:  No pressure, squeezing, strangling, tightness, heaviness or aching about the chest, neck, axilla or epigastrium.  RESPIRATORY:  No cough, shortness of breath, PND or orthopnea.  GASTROINTESTINAL:  No nausea, vomiting or diarrhea.  GENITOURINARY:  No dysuria, frequency or urgency. No Blood in urine  MUSCULOSKELETAL:   moves all joints  SKIN:  No change in skin, hair or nails.  NEUROLOGIC:  No paresthesias, fasciculations, seizures or weakness.  PSYCHIATRIC:  No disorder of thought or mood.  ENDOCRINE:  No heat or cold intolerance, polyuria or polydipsia.  HEMATOLOGICAL:  No easy bruising or bleeding.            Physical Exam:  I V Vital Signs Last 24 Hrs  T(C): 36.7 (19 Aug 2022 07:46), Max: 36.7 (18 Aug 2022 15:15)  T(F): 98 (19 Aug 2022 07:46), Max: 98.1 (18 Aug 2022 15:15)  HR: 62 (19 Aug 2022 07:46) (62 - 73)  BP: 139/87 (19 Aug 2022 07:46) (139/87 - 158/87)  BP(mean): --  RR: 18 (19 Aug 2022 07:46) (18 - 18)  SpO2: 97% (19 Aug 2022 07:46) (95% - 97%)    Parameters below as of 19 Aug 2022 07:46  Patient On (Oxygen Delivery Method): room air        Parameters below as of 18 Aug 2022 04:47  Patient On (Oxygen Delivery Method): room air              GEN: NAD,   HEENT: normocephalic and atraumatic. EOMI. MARCELLE.   LEFT JAW TENDERNESS   NECK: Supple. No carotid bruits.  No lymphadenopathy or thyromegaly.  LUNGS: Clear to auscultation.  HEART: Regular rate and rhythm without murmur.  ABDOMEN: Soft, nontender, and nondistended.  Positive bowel sounds.    : No CVA tenderness  EXTREMITIES: Without any cyanosis, clubbing, rash, lesions or edema.  MSK: no joint swelling  NEUROLOGIC: Cranial nerves II through XII are grossly intact.  PSYCHIATRIC: Appropriate affect .  SKIN: No ulceration or induration present.        Labs:  Vitals:   meropenem  IVPB 1000 milliGRAM(s) IV Intermittent every 8 hours  vancomycin  IVPB 1250 milliGRAM(s) IV Intermittent every 12 hours    Other medications:  bicalutamide 50 milliGRAM(s) Oral daily  dexAMETHasone     Tablet 4 milliGRAM(s) Oral three times a day  enoxaparin Injectable 40 milliGRAM(s) SubCutaneous every 24 hours  fentaNYL   Patch  50 MICROgram(s)/Hr 1 Patch Transdermal every 72 hours  pantoprazole    Tablet 40 milliGRAM(s) Oral before breakfast  senna 2 Tablet(s) Oral at bedtime      =======================================================  Labs:                                              10.4   10.10 )-----------( 316      ( 18 Aug 2022 06:30 )             32.3   08-18    138  |  102  |  21.4<H>  ----------------------------<  142<H>  4.2   |  26.0  |  1.16    Ca    9.0      18 Aug 2022 06:30                  WBC Count: 8.34 K/uL (08-16-22 @ 01:30)    SARS-CoV-2 Result: NotDetec (08-16-22 @ 01:30)      Alkaline Phosphatase, Serum: 166 U/L (08-16-22 @ 01:30)  Alanine Aminotransferase (ALT/SGPT): 8 U/L (08-16-22 @ 01:30)  Aspartate Aminotransferase (AST/SGOT): 11 U/L (08-16-22 @ 01:30)  Bilirubin Total, Serum: <0.2 mg/dL (08-16-22 @ 01:30)

## 2022-08-19 NOTE — DISCHARGE NOTE NURSING/CASE MANAGEMENT/SOCIAL WORK - PATIENT PORTAL LINK FT
You can access the FollowMyHealth Patient Portal offered by [facility] by registering at the following website: [email or web address]. By joining [employer]’s FollowMyHealth portal, you will also be able to view your health information using other applications (apps) compatible with our system.

## 2022-08-19 NOTE — DISCHARGE NOTE PROVIDER - NSDCFUSCHEDAPPT_GEN_ALL_CORE_FT
Miguelito Guy  [facility]  [facility]  Scheduled Appointment: 08/22/2022    [facility]  [facility]  Scheduled Appointment: 08/24/2022    Buddy Farah  [facility]  [facility]  Scheduled Appointment: 09/29/2022    [facility]  [facility]  Scheduled Appointment: 11/16/2022

## 2022-08-19 NOTE — DISCHARGE NOTE NURSING/CASE MANAGEMENT/SOCIAL WORK - NSDCPEFALRISK_GEN_ALL_CORE
For information on Fall & Injury Prevention, visit: [email or web address] OR  [email or web address] OR  https://www.cdc.gov/steadi/patient.html

## 2022-08-19 NOTE — DISCHARGE NOTE PROVIDER - NSDCMRMEDTOKEN_GEN_ALL_CORE_FT
bicalutamide 50 mg oral tablet: 1 tab(s) orally every 24 hours  dexamethasone 4 mg oral tablet: 1 tab(s) orally 3 times a day  docusate sodium 100 mg oral capsule: 1 cap(s) orally 3 times a day  ertapenem 1 g injection: 1 gram(s) intravenously every 24 hours   through 9/27/22  weekly cbc cmp sed rate  fentaNYL 50 mcg/hr transdermal film, extended release: 1 film(s) transdermal every 72 hours  HYDROmorphone 2 mg oral tablet: 1-2  tab(s) orally every 4 hours as needed pain MDD:12  ketoconazole 200 mg oral tablet: 2 tab(s) orally every 8 hours  methocarbamol 750 mg oral tablet: 1 tab(s) orally 3 times a day, As needed, Muscle Spasm  pantoprazole 40 mg oral delayed release tablet: 1 tab(s) orally once a day (before a meal)  polyethylene glycol 3350 oral powder for reconstitution: 17 gram(s) orally 2 times a day, As needed, Constipation  senna oral tablet: 2 tab(s) orally once a day (at bedtime)  Zofran 4 mg oral tablet: 1 tab(s) orally every 8 hours as needed nausea / vomiting   bicalutamide 50 mg oral tablet: 1 tab(s) orally every 24 hours  dexamethasone 4 mg oral tablet: 1 tab(s) orally 3 times a day  docusate sodium 100 mg oral capsule: 1 cap(s) orally 3 times a day  ertapenem 1 g injection: 1 gram(s) intravenously every 24 hours   through 9/27/22  weekly cbc cmp sed rate  fentaNYL 50 mcg/hr transdermal film, extended release: 1 film(s) transdermal every 72 hours  HYDROmorphone 2 mg oral tablet: 1-2  tab(s) orally every 4 hours as needed pain MDD:12  methocarbamol 750 mg oral tablet: 1 tab(s) orally 3 times a day, As needed, Muscle Spasm  pantoprazole 40 mg oral delayed release tablet: 1 tab(s) orally once a day (before a meal)  polyethylene glycol 3350 oral powder for reconstitution: 17 gram(s) orally 2 times a day, As needed, Constipation  senna oral tablet: 2 tab(s) orally once a day (at bedtime)  Zofran 4 mg oral tablet: 1 tab(s) orally every 8 hours as needed nausea / vomiting

## 2022-08-19 NOTE — DISCHARGE NOTE PROVIDER - NSDCCPCAREPLAN_GEN_ALL_CORE_FT
PRINCIPAL DISCHARGE DIAGNOSIS  Diagnosis: Acute osteomyelitis  Assessment and Plan of Treatment: Complete antibiotic course.  Follow up with primary doctor, ENT and ID.      SECONDARY DISCHARGE DIAGNOSES  Diagnosis: Prostate cancer  Assessment and Plan of Treatment: Follow up with primary doctor and oncology.

## 2022-08-19 NOTE — DISCHARGE NOTE PROVIDER - CARE PROVIDER_API CALL
Ra Benitez)  Otolaryngology  [address] [place] [address]  Phone: [phone]  Fax: [phone]  Follow Up Time:     Primary doctor,   Phone: (   )    -  Fax: (   )    -  Follow Up Time:     Hussein Chung)  Infectious Disease; Internal Medicine  [address] [address]  Phone: [phone]  Fax: [phone]  Follow Up Time:     Miguelito Guy)  Internal Medicine  [address] [facility], [place] [address]  Phone: [phone]  Fax: [phone]  Follow Up Time:

## 2022-08-19 NOTE — PROGRESS NOTE ADULT - ASSESSMENT
62 year old male with medical History significant for prostate cancer with bone mets presents with jaw pain that started on the left and then pain progressed to the chin and then ear and behind his eye and headache and is getting worse over past two weeks also associated with subjective fever and malaise. patient endorses getting multiple teeth extracted last year and not following up with his dentist after that , he is on hormone therapy and maintenance steroid.  today he has no CP no SOB no chills no nausea vomiting diarrhea   pain in the jaw is moderate to severe and not relieved or exacerbated with anything - CT IN ED showed  Osteomyelitis of the jaw    WILL NEED ORAL SURGERY FOLLOWUP AS OUTPT AS NONE AVAILABLE  PT HAD GARRY ON  XGEVA   ADN CONCERN THIS COULD BE OSTEONECROSIS  ALSO WITH MALIGNANCY CONCERN FOR METS    D/W ENT  CONCERN  ABOUT DOING SURGICAL  PROCEDURE AT THIS POINT    ALTHOUGH THE JAW  CHANGES   PROBABLY MORE OSTEONECROSIS THEN OSTEOMYELITIS  BUT WILL RX IV INVANZ X  6 WEEKS   AND WILL FOLLOWUP  PICC LINE  PLACED  LEFT ARM    RX TO BE GVEN TO   INVANZ  1 GM Q 24 THROUGH   9/27  WEEKLY CBC CMP SED RATE   WILL FOLOWUP IN OFFICE

## 2022-08-22 ENCOUNTER — APPOINTMENT (OUTPATIENT)
Dept: HEMATOLOGY ONCOLOGY | Facility: CLINIC | Age: 62
End: 2022-08-22

## 2022-08-22 ENCOUNTER — RESULT REVIEW (OUTPATIENT)
Age: 62
End: 2022-08-22

## 2022-08-22 VITALS
BODY MASS INDEX: 27.31 KG/M2 | DIASTOLIC BLOOD PRESSURE: 105 MMHG | WEIGHT: 206.04 LBS | HEIGHT: 73 IN | SYSTOLIC BLOOD PRESSURE: 160 MMHG | OXYGEN SATURATION: 94 % | HEART RATE: 89 BPM

## 2022-08-22 LAB
BASOPHILS # BLD AUTO: 0.1 K/UL — SIGNIFICANT CHANGE UP (ref 0–0.2)
BASOPHILS NFR BLD AUTO: 0.8 % — SIGNIFICANT CHANGE UP (ref 0–2)
CULTURE RESULTS: SIGNIFICANT CHANGE UP
CULTURE RESULTS: SIGNIFICANT CHANGE UP
EOSINOPHIL # BLD AUTO: 0.1 K/UL — SIGNIFICANT CHANGE UP (ref 0–0.5)
EOSINOPHIL NFR BLD AUTO: 0.8 % — SIGNIFICANT CHANGE UP (ref 0–6)
HCT VFR BLD CALC: 40.8 % — SIGNIFICANT CHANGE UP (ref 39–50)
HGB BLD-MCNC: 12.7 G/DL — LOW (ref 13–17)
LYMPHOCYTES # BLD AUTO: 1.4 K/UL — SIGNIFICANT CHANGE UP (ref 1–3.3)
LYMPHOCYTES # BLD AUTO: 12 % — LOW (ref 13–44)
MCHC RBC-ENTMCNC: 25.7 PG — LOW (ref 27–34)
MCHC RBC-ENTMCNC: 31 G/DL — LOW (ref 32–36)
MCV RBC AUTO: 82.7 FL — SIGNIFICANT CHANGE UP (ref 80–100)
MONOCYTES # BLD AUTO: 0.9 K/UL — SIGNIFICANT CHANGE UP (ref 0–0.9)
MONOCYTES NFR BLD AUTO: 7.2 % — SIGNIFICANT CHANGE UP (ref 2–14)
NEUTROPHILS # BLD AUTO: 9.4 K/UL — HIGH (ref 1.8–7.4)
NEUTROPHILS NFR BLD AUTO: 79.2 % — HIGH (ref 43–77)
PLATELET # BLD AUTO: 355 K/UL — SIGNIFICANT CHANGE UP (ref 150–400)
RBC # BLD: 4.93 M/UL — SIGNIFICANT CHANGE UP (ref 4.2–5.8)
RBC # FLD: 15.1 % — HIGH (ref 10.3–14.5)
SPECIMEN SOURCE: SIGNIFICANT CHANGE UP
SPECIMEN SOURCE: SIGNIFICANT CHANGE UP
WBC # BLD: 11.9 K/UL — HIGH (ref 3.8–10.5)
WBC # FLD AUTO: 11.9 K/UL — HIGH (ref 3.8–10.5)

## 2022-08-22 PROCEDURE — 99214 OFFICE O/P EST MOD 30 MIN: CPT

## 2022-08-22 NOTE — HISTORY OF PRESENT ILLNESS
[T: ___] : T[unfilled] [N: ___] : N[unfilled] [M: ___] : M[unfilled] [AJCC Stage: ____] : AJCC Stage: [unfilled] [de-identified] : Mr. Colt Cedeno is a 60 year old male who presents for a follow up visit for severe advanced prostate cancer with painful bone mets. He was diagnosed with prostate cancer by urologist Dr. Dylon Franklin. He was admitted to the emergency room on 12/6/18 for increasing lower back pain and difficulty ambulating, with significant improvements from fentanyl (patch). He is s/p palliative radiation therapy to spine. Began Lupron and Casodex on recent [facility] admission, and was started on ketoconazole by Urology. He has since transitioned to Lupron/Zytiga/Prednisone hormonal therapy.\par \par Bone scan 11/4/2020\par -Multifocal osseous metastases in the spine, scapulae, b/l ribs and pelvic bones, similar in distribution but significantly decreased in extent and intensity , compared to bone scan of 11/2018\par - no new bony lesions [de-identified] : Patient doing well, has no acute complaints. Patient tolerating Lupron, Xgeva, Zytiga, and Prednisone with minimal side effects.States energy level and appetite are good. Admits mild low back pain at end of day but resolves next day.  Admits daily hot flashes. States the hot flashes can wake him up out of his sleep. Denies dysuria, hematuria, fever/chills, fatigue,nausea, vomiting, diarrhea,constipation,  abdominal pain, bleeding, easy bruising or visual changes, chest pain,  SOB or LEAL,  LE edema.\par \par 10/6/21: Colt is here for follow up. He reports that he continues to have hot flashes despite taking Effexor for the past several months. States the hot flashes can wake him up out of his sleep.  Mild fatigue. Appetite is good. Lost 8 lbs in the past 5 months intentionally. Denies HA. CP, SOB, abd pain, constipation, diarrhea, melena, hematuria, dysuria. \par \par 2/9/22: Colt is here for follow up. He reports that he continues to have hot flashes despite taking Effexor for the past several months. States the hot flashes can wake him up out of his sleep.  Mild fatigue. occasional migraine that resolves on its own. Continues to work as . Denies HA. CP, SOB, abd pain, constipation, diarrhea, melena, hematuria, dysuria. \par \par 5/11/22: Patient presents on Eligard, Xgeva, Zytiga and Prednisone for prostate cancer metastatic to bone. \par + HTN with occasional headaches. + Hot flashes and diaphoresis. + Mild fatigue. + Cough. + Left ear ache. No edema, N/V/D/C, dyspepsia, arthralgia/myalgia, fever, cough or SOB.\par \par 8/22/22:Patient presents on Eligard, Xgeva, Zytiga and Prednisone for prostate cancer metastatic to bone.   \par Pt was hospitalized last week for left lower jaw pain. Found to have osteomyelitis/osteonecrosis. Pt is on IV abx for 6 weeks. Able to eat regular food. Pt reports he had multiple teeth extractions last year. No oral surgery. Concerning for osteonecrosis of jaw given pt is on treatment with Xgeva.  Pt restarted taking his meds. + hot flashes.\par

## 2022-08-22 NOTE — ASSESSMENT
[FreeTextEntry1] : 61 y/o male with prostate cancer metastatic to bone. \par \par # Prostate cancer\par -Continue Eligard, and Zytiga/Prednisone\par -PSA remains <0.01 ng/mL since 6/2020\par -Oscal for bone health\par -discontinued Effexor as it was not helping his hot flashes\par -needs to see PCP ASAP regarding HTN, discussed at length today\par -will hold Xgeva given possible jaw osteoecrosis\par \par \par # left jaw osteonecrosis vs osteomyelitis\par -Pt is on IV abx for 6 weeks via left arm PICC line\par -follow up with dentis >> may need surgery\par -will hold further Xgeva until pt recovers\par \par \par RTC in 6 weeks\par \par \par

## 2022-08-22 NOTE — REASON FOR VISIT
[Follow-Up Visit] : a follow-up [FreeTextEntry2] : metastatic prostate cancer

## 2022-08-23 ENCOUNTER — RX RENEWAL (OUTPATIENT)
Age: 62
End: 2022-08-23

## 2022-08-23 LAB
ALBUMIN SERPL ELPH-MCNC: 3.8 G/DL
ALP BLD-CCNC: 142 U/L
ALT SERPL-CCNC: 11 U/L
ANION GAP SERPL CALC-SCNC: 14 MMOL/L
AST SERPL-CCNC: 10 U/L
BILIRUB SERPL-MCNC: <0.2 MG/DL
BUN SERPL-MCNC: 17 MG/DL
CALCIUM SERPL-MCNC: 9.6 MG/DL
CHLORIDE SERPL-SCNC: 102 MMOL/L
CO2 SERPL-SCNC: 24 MMOL/L
CREAT SERPL-MCNC: 1.23 MG/DL
EGFR: 66 ML/MIN/1.73M2
GLUCOSE SERPL-MCNC: 121 MG/DL
MAGNESIUM SERPL-MCNC: 1.7 MG/DL
POTASSIUM SERPL-SCNC: 4.2 MMOL/L
PROT SERPL-MCNC: 6.9 G/DL
PSA SERPL-MCNC: <0.01 NG/ML
SODIUM SERPL-SCNC: 140 MMOL/L
TESTOST SERPL-MCNC: <2.5 NG/DL

## 2022-08-24 ENCOUNTER — NON-APPOINTMENT (OUTPATIENT)
Age: 62
End: 2022-08-24

## 2022-08-24 ENCOUNTER — APPOINTMENT (OUTPATIENT)
Age: 62
End: 2022-08-24

## 2022-08-31 ENCOUNTER — EMERGENCY (EMERGENCY)
Facility: HOSPITAL | Age: 62
LOS: 1 days | Discharge: HOME | End: 2022-08-31
Attending: EMERGENCY MEDICINE
Payer: COMMERCIAL

## 2022-08-31 VITALS
RESPIRATION RATE: 18 BRPM | OXYGEN SATURATION: 98 % | SYSTOLIC BLOOD PRESSURE: 116 MMHG | DIASTOLIC BLOOD PRESSURE: 65 MMHG

## 2022-08-31 VITALS
WEIGHT: 210.1 LBS | DIASTOLIC BLOOD PRESSURE: 67 MMHG | SYSTOLIC BLOOD PRESSURE: 106 MMHG | RESPIRATION RATE: 16 BRPM | OXYGEN SATURATION: 98 % | TEMPERATURE: 98 F | HEIGHT: 73 IN | HEART RATE: 74 BPM

## 2022-08-31 LAB
ALBUMIN SERPL ELPH-MCNC: 3.7 G/DL — SIGNIFICANT CHANGE UP (ref 3.3–5.2)
ALP SERPL-CCNC: 134 U/L — HIGH (ref 40–120)
ALT FLD-CCNC: 10 U/L — SIGNIFICANT CHANGE UP
ANION GAP SERPL CALC-SCNC: 9 MMOL/L — SIGNIFICANT CHANGE UP (ref 5–17)
AST SERPL-CCNC: 15 U/L — SIGNIFICANT CHANGE UP
BASOPHILS # BLD AUTO: 0.08 K/UL — SIGNIFICANT CHANGE UP (ref 0–0.2)
BASOPHILS NFR BLD AUTO: 0.9 % — SIGNIFICANT CHANGE UP (ref 0–2)
BILIRUB SERPL-MCNC: 0.2 MG/DL — LOW (ref 0.4–2)
BUN SERPL-MCNC: 19 MG/DL — SIGNIFICANT CHANGE UP (ref 8–20)
CALCIUM SERPL-MCNC: 9.9 MG/DL — SIGNIFICANT CHANGE UP (ref 8.4–10.5)
CHLORIDE SERPL-SCNC: 104 MMOL/L — SIGNIFICANT CHANGE UP (ref 98–107)
CO2 SERPL-SCNC: 27 MMOL/L — SIGNIFICANT CHANGE UP (ref 22–29)
CREAT SERPL-MCNC: 1.28 MG/DL — SIGNIFICANT CHANGE UP (ref 0.5–1.3)
EGFR: 63 ML/MIN/1.73M2 — SIGNIFICANT CHANGE UP
EOSINOPHIL # BLD AUTO: 0 K/UL — SIGNIFICANT CHANGE UP (ref 0–0.5)
EOSINOPHIL NFR BLD AUTO: 0 % — SIGNIFICANT CHANGE UP (ref 0–6)
GIANT PLATELETS BLD QL SMEAR: PRESENT — SIGNIFICANT CHANGE UP
GLUCOSE SERPL-MCNC: 90 MG/DL — SIGNIFICANT CHANGE UP (ref 70–99)
HCT VFR BLD CALC: 37.4 % — LOW (ref 39–50)
HGB BLD-MCNC: 11.7 G/DL — LOW (ref 13–17)
LYMPHOCYTES # BLD AUTO: 2.01 K/UL — SIGNIFICANT CHANGE UP (ref 1–3.3)
LYMPHOCYTES # BLD AUTO: 21.9 % — SIGNIFICANT CHANGE UP (ref 13–44)
MAGNESIUM SERPL-MCNC: 1.7 MG/DL — SIGNIFICANT CHANGE UP (ref 1.6–2.6)
MANUAL SMEAR VERIFICATION: SIGNIFICANT CHANGE UP
MCHC RBC-ENTMCNC: 26 PG — LOW (ref 27–34)
MCHC RBC-ENTMCNC: 31.3 GM/DL — LOW (ref 32–36)
MCV RBC AUTO: 83.1 FL — SIGNIFICANT CHANGE UP (ref 80–100)
METAMYELOCYTES # FLD: 0.9 % — HIGH (ref 0–0)
MONOCYTES # BLD AUTO: 0.48 K/UL — SIGNIFICANT CHANGE UP (ref 0–0.9)
MONOCYTES NFR BLD AUTO: 5.2 % — SIGNIFICANT CHANGE UP (ref 2–14)
NEUTROPHILS # BLD AUTO: 6.44 K/UL — SIGNIFICANT CHANGE UP (ref 1.8–7.4)
NEUTROPHILS NFR BLD AUTO: 70.2 % — SIGNIFICANT CHANGE UP (ref 43–77)
PLAT MORPH BLD: NORMAL — SIGNIFICANT CHANGE UP
PLATELET # BLD AUTO: 246 K/UL — SIGNIFICANT CHANGE UP (ref 150–400)
POTASSIUM SERPL-MCNC: 5.1 MMOL/L — SIGNIFICANT CHANGE UP (ref 3.5–5.3)
POTASSIUM SERPL-SCNC: 5.1 MMOL/L — SIGNIFICANT CHANGE UP (ref 3.5–5.3)
PROT SERPL-MCNC: 6.9 G/DL — SIGNIFICANT CHANGE UP (ref 6.6–8.7)
RBC # BLD: 4.5 M/UL — SIGNIFICANT CHANGE UP (ref 4.2–5.8)
RBC # FLD: 16.3 % — HIGH (ref 10.3–14.5)
RBC BLD AUTO: NORMAL — SIGNIFICANT CHANGE UP
SODIUM SERPL-SCNC: 140 MMOL/L — SIGNIFICANT CHANGE UP (ref 135–145)
VARIANT LYMPHS # BLD: 0.9 % — SIGNIFICANT CHANGE UP (ref 0–6)
WBC # BLD: 9.17 K/UL — SIGNIFICANT CHANGE UP (ref 3.8–10.5)
WBC # FLD AUTO: 9.17 K/UL — SIGNIFICANT CHANGE UP (ref 3.8–10.5)

## 2022-08-31 PROCEDURE — 93005 ELECTROCARDIOGRAM TRACING: CPT

## 2022-08-31 PROCEDURE — 36415 COLL VENOUS BLD VENIPUNCTURE: CPT

## 2022-08-31 PROCEDURE — 80053 COMPREHEN METABOLIC PANEL: CPT

## 2022-08-31 PROCEDURE — 99283 EMERGENCY DEPT VISIT LOW MDM: CPT | Mod: 25

## 2022-08-31 PROCEDURE — 96360 HYDRATION IV INFUSION INIT: CPT

## 2022-08-31 PROCEDURE — 93010 ELECTROCARDIOGRAM REPORT: CPT

## 2022-08-31 PROCEDURE — 99284 EMERGENCY DEPT VISIT MOD MDM: CPT

## 2022-08-31 PROCEDURE — 83735 ASSAY OF MAGNESIUM: CPT

## 2022-08-31 PROCEDURE — 85025 COMPLETE CBC W/AUTO DIFF WBC: CPT

## 2022-08-31 RX ORDER — SODIUM CHLORIDE 9 MG/ML
1000 INJECTION INTRAMUSCULAR; INTRAVENOUS; SUBCUTANEOUS ONCE
Refills: 0 | Status: COMPLETED | OUTPATIENT
Start: 2022-08-31 | End: 2022-08-31

## 2022-08-31 RX ADMIN — SODIUM CHLORIDE 1000 MILLILITER(S): 9 INJECTION INTRAMUSCULAR; INTRAVENOUS; SUBCUTANEOUS at 19:59

## 2022-08-31 NOTE — ED PROVIDER NOTE - OBJECTIVE STATEMENT
Pertinent PMH/PSH/FHx/SHx and Review of Systems contained within:  Patient presents to the ED for reportedly elevated potassium on outpatient labs.  VSS.  PMH of metastatic prostate CA on hormone therapy and current osteo of the left jaw with a PICC line in situ in the LUE for abx.  Labs drawn 2 days ago.  called today for elevated K.  Otherwise baseline.  Non toxic.  Well appearing. No aggravating or relieving factors. No other pertinent PMH.  No other pertinent PSH.  No other pertinent FHx.  Patient denies EtOH/tobacco/illicit substance use. No fever/chills, No photophobia/eye pain/changes in vision, No ear pain/sore throat/dysphagia, No chest pain/palpitations, no SOB/cough/wheeze/stridor, No abdominal pain, No N/V/D, no dysuria/frequency/discharge, No neck/back pain, no rash, no changes in neurological status/function.

## 2022-08-31 NOTE — ED PROVIDER NOTE - CLINICAL SUMMARY MEDICAL DECISION MAKING FREE TEXT BOX
Patient with reported hyperkalemia.  VSS.  Exam as above.  Lab values do not require emergent intervention. Uneventful ED observation period. Non toxic.  Well appearing. Discussed signs and symptoms and reasons for return with good teachback.

## 2022-08-31 NOTE — ED PROVIDER NOTE - PATIENT PORTAL LINK FT
You can access the FollowMyHealth Patient Portal offered by [facility] by registering at the following website: [email or web address]. By joining [employer]’s FollowMyHealth portal, you will also be able to view your health information using other applications (apps) compatible with our system.

## 2022-08-31 NOTE — ED PROVIDER NOTE - NS ED MD DISPO DISCHARGE
Her blood pressure continues to me marginal.  I have recommend aggressive control and have encouraged her to enroll in the DIGITAL HYPERTENSION program.    Referral done.    FABIO   Home

## 2022-08-31 NOTE — ED ADULT TRIAGE NOTE - CHIEF COMPLAINT QUOTE
Pt being treated at home for osteomyelitis of his jaw. Had labs drawn at home 2 days ago and was told to come in because his potassium was elevated. Pt is asymptomatic.

## 2022-09-04 ENCOUNTER — RX RENEWAL (OUTPATIENT)
Age: 62
End: 2022-09-04

## 2022-09-06 ASSESSMENT — REFRACTION_AUTOREFRACTION
OS_SPHERE: -1.25
OD_CYLINDER: -0.75
OS_AXIS: 142
OD_AXIS: 066
OS_CYLINDER: -0.50
OD_SPHERE: -1.25

## 2022-09-06 ASSESSMENT — REFRACTION_CURRENTRX
OS_CYLINDER: -0.50
OD_AXIS: 063
OS_OVR_VA: 20/
OS_AXIS: 134
OS_SPHERE: -1.75
OD_SPHERE: -1.75
OD_CYLINDER: -0.75
OD_OVR_VA: 20/

## 2022-09-06 ASSESSMENT — SPHEQUIV_DERIVED
OS_SPHEQUIV: -1.5
OD_SPHEQUIV: -1.625

## 2022-09-06 ASSESSMENT — KERATOMETRY
OS_K1POWER_DIOPTERS: 41.46
OS_AXISANGLE_DEGREES: 064
OD_K1POWER_DIOPTERS: 40.76
OD_AXISANGLE_DEGREES: 142
OS_K2POWER_DIOPTERS: 41.93
OD_K2POWER_DIOPTERS: 41.67

## 2022-09-06 ASSESSMENT — AXIALLENGTH_DERIVED
OS_AL: 24.91
OD_AL: 25.16

## 2022-09-06 ASSESSMENT — VISUAL ACUITY
OS_BCVA: 20/20-1
OD_BCVA: 20/20-1

## 2022-09-12 ENCOUNTER — APPOINTMENT (OUTPATIENT)
Dept: INTERNAL MEDICINE | Facility: CLINIC | Age: 62
End: 2022-09-12

## 2022-09-12 VITALS
SYSTOLIC BLOOD PRESSURE: 134 MMHG | HEIGHT: 73 IN | BODY MASS INDEX: 27.83 KG/M2 | OXYGEN SATURATION: 97 % | DIASTOLIC BLOOD PRESSURE: 84 MMHG | WEIGHT: 210 LBS | HEART RATE: 76 BPM

## 2022-09-12 PROCEDURE — 99215 OFFICE O/P EST HI 40 MIN: CPT

## 2022-09-12 NOTE — HISTORY OF PRESENT ILLNESS
[FreeTextEntry1] :  62 year old male with medical History significant for prostate cancer with bone\par mets presents to [facility]  with jaw pain that started on the left and then pain progressed\par to the chin and then ear and behind his eye and headache and was getting worse\par over past two weeks. CT with findings consistent with extensive chronic\par osteomyelitis with soft tissue\par sequestrum adjacent to the mandible and surrounding the maxilla \par  . Blood cultures negative. Patient\par treated with IV antibiotics. Patient had PICC placed and PLAN IV INVANZ  THROUGH 9/27  \par HERE FOR  INFECTIOUS DISEASE  FOLLOWUP \par  \par

## 2022-09-12 NOTE — ASSESSMENT
[FreeTextEntry1] :  62 year old male with medical History significant for prostate cancer with bone\par mets presents to [facility]  with jaw pain that started on the left and then pain progressed\par to the chin and then ear and behind his eye and headache and was getting worse\par over past two weeks. CT with findings consistent with extensive chronic\par osteomyelitis with soft tissue\par sequestrum adjacent to the mandible and surrounding the maxilla \par  . Blood cultures negative. Patient\par treated with IV antibiotics. Patient had PICC placed and PLAN IV INVANZ  THROUGH 9/27  \par HERE FOR FOLLOWUP \par PT FEELS BETTER BUT STILL WITH SOME TENDERNESS\par NO FEVER NO CHILLS\par LABS REVIEWED WITH PT SED RATE IMPROVED\par PT WITH HX OF XGEVA AND THIS MAY BE OSTEONECROSIS OF THE JAW RELATED TO THIS MEDS\par PT WAS GIVEN IV ABX  AND AS ABOVE PLAN 6 WEEKS IV ABX\par PT NEEDS FOLLOWUP WITH ENT/HEAD AND NECK WHO SAW HIM AT [place]  SPOKE TO  \par I ARRANGED FOR FOLLOWUP \par WILLRX  REPEAT IMAGING TO EVALUATE AREA\par AND FOLLOWUP THE RESULTS WITH PT \par \par

## 2022-09-12 NOTE — PHYSICAL EXAM
[General Appearance - Alert] : alert [General Appearance - In No Acute Distress] : in no acute distress [Sclera] : the sclera and conjunctiva were normal [PERRL With Normal Accommodation] : pupils were equal in size, round, reactive to light [Extraocular Movements] : extraocular movements were intact [Outer Ear] : the ears and nose were normal in appearance [Oropharynx] : the oropharynx was normal with no thrush [FreeTextEntry1] : LEFT MANDIBULAR TENDERNESS  [Neck Appearance] : the appearance of the neck was normal [Neck Cervical Mass (___cm)] : no neck mass was observed [Jugular Venous Distention Increased] : there was no jugular-venous distention [Thyroid Diffuse Enlargement] : the thyroid was not enlarged [Full Pulse] : the pedal pulses are present [Edema] : there was no peripheral edema [Bowel Sounds] : normal bowel sounds [Abdomen Soft] : soft [Abdomen Tenderness] : non-tender [Abdomen Mass (___ Cm)] : no abdominal mass palpated [Costovertebral Angle Tenderness] : no CVA tenderness [No Palpable Adenopathy] : no palpable adenopathy [Musculoskeletal - Swelling] : no joint swelling [Nail Clubbing] : no clubbing  or cyanosis of the fingernails [Motor Tone] : muscle strength and tone were normal [Skin Color & Pigmentation] : normal skin color and pigmentation [] : no rash [Deep Tendon Reflexes (DTR)] : deep tendon reflexes were 2+ and symmetric [Sensation] : the sensory exam was normal to light touch and pinprick [No Focal Deficits] : no focal deficits

## 2022-09-15 ENCOUNTER — RESULT REVIEW (OUTPATIENT)
Age: 62
End: 2022-09-15

## 2022-09-15 ENCOUNTER — APPOINTMENT (OUTPATIENT)
Dept: OTOLARYNGOLOGY | Facility: CLINIC | Age: 62
End: 2022-09-15

## 2022-09-15 VITALS
HEIGHT: 73 IN | SYSTOLIC BLOOD PRESSURE: 117 MMHG | BODY MASS INDEX: 27.83 KG/M2 | WEIGHT: 210 LBS | TEMPERATURE: 98.4 F | DIASTOLIC BLOOD PRESSURE: 76 MMHG | HEART RATE: 71 BPM

## 2022-09-15 PROCEDURE — 99214 OFFICE O/P EST MOD 30 MIN: CPT

## 2022-09-15 RX ORDER — ONDANSETRON 4 MG/1
4 TABLET ORAL EVERY 6 HOURS
Qty: 20 | Refills: 0 | Status: COMPLETED | COMMUNITY
Start: 2020-04-11 | End: 2022-09-15

## 2022-09-15 RX ORDER — BRIMONIDINE TARTRATE, TIMOLOL MALEATE 2; 5 MG/ML; MG/ML
0.2-0.5 SOLUTION/ DROPS TOPICAL
Qty: 5 | Refills: 0 | Status: ACTIVE | COMMUNITY
Start: 2022-08-22

## 2022-09-15 RX ORDER — LATANOPROST/PF 0.005 %
0.01 DROPS OPHTHALMIC (EYE)
Qty: 2 | Refills: 0 | Status: ACTIVE | COMMUNITY
Start: 2022-08-14

## 2022-09-15 RX ORDER — LOSARTAN POTASSIUM 50 MG/1
50 TABLET, FILM COATED ORAL
Qty: 90 | Refills: 0 | Status: DISCONTINUED | COMMUNITY
Start: 2022-06-17

## 2022-09-15 RX ORDER — CALCIUM CARBONATE/VITAMIN D3 600 MG-10
600-400 TABLET ORAL
Qty: 30 | Refills: 3 | Status: COMPLETED | COMMUNITY
Start: 2022-06-20 | End: 2022-09-15

## 2022-09-15 RX ORDER — DIAPER,BRIEF,INFANT-TODD,DISP
600-400 EACH MISCELLANEOUS
Qty: 30 | Refills: 3 | Status: COMPLETED | COMMUNITY
Start: 2021-10-06 | End: 2022-09-15

## 2022-09-15 NOTE — HISTORY OF PRESENT ILLNESS
[de-identified] : 62 year old male referred by Dr Chung for left jaw pain. HIstory of metastatic prostate cancer, completed RT and currently doing hormone therapy. States first noticed left jaw discomfort 5 months ago.  He had been taking Xgeva (RANKL inhibitor).  The pain progressively became worse, then noticed swelling on the left side of his chin 2 months ago was tender, was having numbness from the left side of jaw radiating to the right side of jaw, hard time opening mouth, left otalgia, left neck pain went to [facility] was given IV antibiotics, was discharged with PICC line to continue antibiotics until the end of the month. States currently still feels tenderness by his gum line, left chin lump is , left jaw is still numb and intermittent bilateral tinnitus. Denies difficulty or pain chewing, headaches, left otalgia, left neck pain, hearing loss, otorrhea,fevers or ear infections. \par \par

## 2022-09-15 NOTE — ASSESSMENT
[FreeTextEntry1] : Pt with no residual jaw swelling.  Pt to complete course of Abx and will remain off the Xgeva.\par \par Pt states that he has teeth that are turning brown.  They may need removal.  Pt to see Dr. Camarillo for an evaluation.  If mandible issue recurs after the Abx stop he may need jaw reconstruction.

## 2022-09-15 NOTE — DATA REVIEWED
[de-identified] : IMAGES REVIEWED:\par ACC: [id number] EXAM: CT MAXILLOFACIAL IC\par ACC: [id number] EXAM: CT BRAIN\par \par PROCEDURE DATE: 08/16/2022\par \par \par \par INTERPRETATION: CT Brain without contrast.\par CT Facial Bones with contrast.\par \par CLINICAL INFORMATION: headache left sided jaw pain swelling\par \par \par TECHNIQUE: Acquisition through the brain without contrast and facial bones with contrast. Coronal and sagittal reformations were reviewed.\par \par 70 cc Omnipaque 350 administered. 10 cc discarded.\par \par COMPARISON: None available.\par \par FINDINGS:\par \par Brain:\par \par No acute intracranial abnormalities.\par \par No mass effect or edema.\par \par No evidence of acute cortical infarction or hemorrhage.\par \par No sinusitis.\par \par No skull fracture.\par \par Facial Bones:\par \par Diffuse sclerotic changes involving the anterior aspect of the mandibular body and adjacent maxilla. Destructive changes involving anterior aspect of the left side of the mandible in the region of the left canine, anterior and posterior premolars as seen on axial image 61 series 3. Destructive changes involving the maxilla most pronounced at the anterior aspect of the left-sided maxilla axial image 109 series 3/sagittal image 80 series 8. Moderately extensive fluid adjacent to the anterior aspect of the left-sided mandible and surrounding the maxilla. Findings consistent with extensive chronic osteomyelitis with soft tissue sequestrum adjacent to the mandible and surrounding the maxilla.\par \par Thickening of the cervical fascia bilaterally.\par \par Moderately extensive adenopathy within the submandibular regions, jugular chain and posterior cervical regions, left greater than right.\par \par No evidence of orbital or other facial fracture.\par \par Moderate mucosal thickening involving left maxillary sinus. There are no acute air-fluid levels.\par \par Advanced hypertrophic degenerative changes cervical spine.\par \par No other acute findings.\par \par IMPRESSION:\par \par No acute intracranial abnormality.\par \par Findings consistent with extensive chronic osteomyelitis with soft tissue sequestrum adjacent to the mandible and surrounding the maxilla as described.\par \par \par \par \par \par --- End of Report ---\par \par \par \par \par \par DIAN HERNANDEZ MD; Attending Radiologist\par This document has been electronically signed. Aug 16 2022 7:18AM

## 2022-09-15 NOTE — CONSULT LETTER
[Dear  ___] : Dear  [unfilled], [Courtesy Letter:] : I had the pleasure of seeing your patient, [unfilled], in my office today. [Please see my note below.] : Please see my note below. [Consult Closing:] : Thank you very much for allowing me to participate in the care of this patient.  If you have any questions, please do not hesitate to contact me. [Sincerely,] : Sincerely, [DrEric  ___] : Dr. MUNOZ [FreeTextEntry2] : Hussein Chung MD [FreeTextEntry3] : Ra Benitez MD, FACS\par Chief of Otolaryngology and Head & Neck Surgery\par [facility]\par  - Dept. of Otolaryngology\par [employer]\par \par  [DrEric ___] : Dr. MUNOZ

## 2022-09-26 ENCOUNTER — OUTPATIENT (OUTPATIENT)
Dept: OUTPATIENT SERVICES | Facility: HOSPITAL | Age: 62
LOS: 1 days | End: 2022-09-26

## 2022-09-26 ENCOUNTER — APPOINTMENT (OUTPATIENT)
Dept: CT IMAGING | Facility: CLINIC | Age: 62
End: 2022-09-26

## 2022-09-26 DIAGNOSIS — M27.2 INFLAMMATORY CONDITIONS OF JAWS: ICD-10-CM

## 2022-09-26 DIAGNOSIS — M87.9 OSTEONECROSIS, UNSPECIFIED: ICD-10-CM

## 2022-09-26 PROCEDURE — 70487 CT MAXILLOFACIAL W/DYE: CPT | Mod: 26

## 2022-09-29 ENCOUNTER — APPOINTMENT (OUTPATIENT)
Dept: INTERNAL MEDICINE | Facility: CLINIC | Age: 62
End: 2022-09-29

## 2022-09-29 VITALS
BODY MASS INDEX: 27.83 KG/M2 | SYSTOLIC BLOOD PRESSURE: 114 MMHG | DIASTOLIC BLOOD PRESSURE: 78 MMHG | HEIGHT: 73 IN | WEIGHT: 210 LBS | HEART RATE: 72 BPM | RESPIRATION RATE: 12 BRPM

## 2022-09-29 VITALS
DIASTOLIC BLOOD PRESSURE: 70 MMHG | WEIGHT: 210 LBS | SYSTOLIC BLOOD PRESSURE: 100 MMHG | HEART RATE: 76 BPM | OXYGEN SATURATION: 99 % | BODY MASS INDEX: 27.83 KG/M2 | HEIGHT: 73 IN

## 2022-09-29 DIAGNOSIS — Z23 ENCOUNTER FOR IMMUNIZATION: ICD-10-CM

## 2022-09-29 DIAGNOSIS — C79.51 MALIGNANT NEOPLASM OF PROSTATE: ICD-10-CM

## 2022-09-29 DIAGNOSIS — M87.9 OSTEONECROSIS, UNSPECIFIED: ICD-10-CM

## 2022-09-29 DIAGNOSIS — C61 MALIGNANT NEOPLASM OF PROSTATE: ICD-10-CM

## 2022-09-29 PROCEDURE — 90686 IIV4 VACC NO PRSV 0.5 ML IM: CPT

## 2022-09-29 PROCEDURE — 99215 OFFICE O/P EST HI 40 MIN: CPT

## 2022-09-29 PROCEDURE — G0008: CPT

## 2022-09-29 PROCEDURE — 99214 OFFICE O/P EST MOD 30 MIN: CPT | Mod: 25

## 2022-09-29 NOTE — ASSESSMENT
[FreeTextEntry1] :  62 year old male with medical History significant for prostate cancer with bone\par mets presents to [facility]  with jaw pain that started on the left and then pain progressed\par to the chin and then ear and behind his eye and headache and was getting worse\par over past two weeks. CT with findings consistent with extensive chronic\par osteomyelitis with soft tissue\par sequestrum adjacent to the mandible and surrounding the maxilla \par  . Blood cultures negative. Patient\par treated with IV antibiotics. Patient had PICC placed and PLAN IV INVANZ  THROUGH 9/27  with IMPROVEMENT \par LAST SEEN ON 9/12 \par HERE FOR  INFECTIOUS DISEASE  FOLLOWUP \par OVERALL IMPROVED PATIENT HAD CR CAN DON OF MANDIBLE UNFORTUNATELY HAS NOT BEEN OFFICAL READ \par WILL D/C PICC LINE AS PT HAS AHD 6 WEEKS WILL CONTINUE WITH ORAL ABX \par RX AUGMENTIN 875 BID\par AND FOLLOW UP RESULTSOF  CT SCAN\par WILL REFER TO ORAL SURGERY AT [facility] WILL FOLLOWUP \par \par  \par

## 2022-09-29 NOTE — PHYSICAL EXAM
[General Appearance - Alert] : alert [General Appearance - In No Acute Distress] : in no acute distress [Sclera] : the sclera and conjunctiva were normal [PERRL With Normal Accommodation] : pupils were equal in size, round, reactive to light [Extraocular Movements] : extraocular movements were intact [Outer Ear] : the ears and nose were normal in appearance [Oropharynx] : the oropharynx was normal with no thrush [FreeTextEntry1] : DECREASED LEFT MANDIBULAR EDEMA NON TNEDER TO PALPATION  [Neck Appearance] : the appearance of the neck was normal [Neck Cervical Mass (___cm)] : no neck mass was observed [Jugular Venous Distention Increased] : there was no jugular-venous distention [Thyroid Diffuse Enlargement] : the thyroid was not enlarged [Full Pulse] : the pedal pulses are present [Edema] : there was no peripheral edema [Bowel Sounds] : normal bowel sounds [Abdomen Soft] : soft [Abdomen Tenderness] : non-tender [Abdomen Mass (___ Cm)] : no abdominal mass palpated [Costovertebral Angle Tenderness] : no CVA tenderness [No Palpable Adenopathy] : no palpable adenopathy [Musculoskeletal - Swelling] : no joint swelling [Nail Clubbing] : no clubbing  or cyanosis of the fingernails [Motor Tone] : muscle strength and tone were normal [Skin Color & Pigmentation] : normal skin color and pigmentation [] : no rash [Deep Tendon Reflexes (DTR)] : deep tendon reflexes were 2+ and symmetric [Sensation] : the sensory exam was normal to light touch and pinprick [No Focal Deficits] : no focal deficits

## 2022-09-29 NOTE — ASSESSMENT
[FreeTextEntry1] : osteomyelitis/necrosis of mandible completed iv invanz\par bp stable\par flu vaccine given\par prostate cancer met to the bone, heme onc to decide on new course of action\par for his cancer Xgeva discontinued

## 2022-09-29 NOTE — HISTORY OF PRESENT ILLNESS
[FreeTextEntry1] :  62 year old male with medical History significant for prostate cancer with bone\par mets presents to [facility]  with jaw pain that started on the left and then pain progressed\par to the chin and then ear and behind his eye and headache and was getting worse\par over past two weeks. CT with findings consistent with extensive chronic\par osteomyelitis with soft tissue\par sequestrum adjacent to the mandible and surrounding the maxilla \par  . Blood cultures negative. Patient\par treated with IV antibiotics. Patient had PICC placed and PLAN IV INVANZ  THROUGH 9/27  with IMPROVEMENT \par LAST SEEN ON 9/12 \par HERE FOR  INFECTIOUS DISEASE  FOLLOWUP \par  \par

## 2022-09-29 NOTE — HEALTH RISK ASSESSMENT
[Never] : Never [No] : No [No falls in past year] : Patient reported no falls in the past year [0] : 2) Feeling down, depressed, or hopeless: Not at all (0) [PHQ-2 Negative - No further assessment needed] : PHQ-2 Negative - No further assessment needed [[id number]] : 0

## 2022-09-29 NOTE — HISTORY OF PRESENT ILLNESS
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

## 2022-09-30 ENCOUNTER — APPOINTMENT (OUTPATIENT)
Dept: HEMATOLOGY ONCOLOGY | Facility: CLINIC | Age: 62
End: 2022-09-30

## 2022-09-30 PROCEDURE — 99443: CPT

## 2022-09-30 NOTE — ASSESSMENT
[FreeTextEntry1] : 59 y/o male with prostate cancer metastatic to bone. 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

## 2022-09-30 NOTE — HISTORY OF PRESENT ILLNESS
[T: ___] : T[unfilled] [N: ___] : N[unfilled] [M: ___] : M[unfilled] [AJCC Stage: ____] : AJCC Stage: [unfilled] [de-identified] : Mr. Colt Cedeno is a 60 year old male who presents for a follow up visit for severe advanced prostate cancer with painful bone mets. He was diagnosed with prostate cancer by urologist Dr. Dylon Franklin. He was admitted to the emergency room on 12/6/18 for increasing lower back pain and difficulty ambulating, with significant improvements from fentanyl (patch). He is s/p palliative radiation therapy to spine. Began Lupron and Casodex on recent [facility] admission, and was started on ketoconazole by Urology. He has since transitioned to Lupron/Zytiga/Prednisone hormonal therapy.\par \par Bone scan 11/4/2020\par -Multifocal osseous metastases in the spine, scapulae, b/l ribs and pelvic bones, similar in distribution but significantly decreased in extent and intensity , compared to bone scan of 11/2018\par - no new bony lesions [de-identified] : Patient doing well, has no acute complaints. Patient tolerating Lupron, Xgeva, Zytiga, and Prednisone with minimal side effects.States energy level and appetite are good. Admits mild low back pain at end of day but resolves next day.  Admits daily hot flashes. States the hot flashes can wake him up out of his sleep. Denies dysuria, hematuria, fever/chills, fatigue,nausea, vomiting, diarrhea,constipation,  abdominal pain, bleeding, easy bruising or visual changes, chest pain,  SOB or LEAL,  LE edema.\par \par 10/6/21: Colt is here for follow up. He reports that he continues to have hot flashes despite taking Effexor for the past several months. States the hot flashes can wake him up out of his sleep.  Mild fatigue. Appetite is good. Lost 8 lbs in the past 5 months intentionally. Denies HA. CP, SOB, abd pain, constipation, diarrhea, melena, hematuria, dysuria. \par \par 2/9/22: Colt is here for follow up. He reports that he continues to have hot flashes despite taking Effexor for the past several months. States the hot flashes can wake him up out of his sleep.  Mild fatigue. occasional migraine that resolves on its own. Continues to work as . Denies HA. CP, SOB, abd pain, constipation, diarrhea, melena, hematuria, dysuria. \par \par 5/11/22: Patient presents on Eligard, Xgeva, Zytiga and Prednisone for prostate cancer metastatic to bone. \par + HTN with occasional headaches. + Hot flashes and diaphoresis. + Mild fatigue. + Cough. + Left ear ache. No edema, N/V/D/C, dyspepsia, arthralgia/myalgia, fever, cough or SOB.\par \par 8/22/22:Patient presents on Eligard, Xgeva, Zytiga and Prednisone for prostate cancer metastatic to bone.   \par Pt was hospitalized last week for left lower jaw pain. Found to have osteomyelitis/osteonecrosis. Pt is on IV abx for 6 weeks. Able to eat regular food. Pt reports he had multiple teeth extractions last year. No oral surgery. Concerning for osteonecrosis of jaw given pt is on treatment with Xgeva.  Pt restarted taking his meds. + hot flashes.\par \par 9/30/22: Spoke to pt via TTM. Patient presents on Eligard, Xgeva, Zytiga and Prednisone for prostate cancer metastatic to bone.\par Pt has N/V,  after eating mall food. Getting better. Also had flu shot.  pt's jaw swelling and pain resolved. He is to complete abx course and will remain off Xgeva. + hot flashes. No dizziness

## 2022-10-19 ENCOUNTER — RX RENEWAL (OUTPATIENT)
Age: 62
End: 2022-10-19

## 2022-10-20 ENCOUNTER — RX RENEWAL (OUTPATIENT)
Age: 62
End: 2022-10-20

## 2022-10-24 ENCOUNTER — OUTPATIENT (OUTPATIENT)
Dept: OUTPATIENT SERVICES | Facility: HOSPITAL | Age: 62
LOS: 1 days | Discharge: HOME | End: 2022-10-24

## 2022-10-24 DIAGNOSIS — C61 MALIGNANT NEOPLASM OF PROSTATE: ICD-10-CM

## 2022-10-24 DIAGNOSIS — C79.51 SECONDARY MALIGNANT NEOPLASM OF BONE: ICD-10-CM

## 2022-10-25 ENCOUNTER — APPOINTMENT (OUTPATIENT)
Dept: HEMATOLOGY ONCOLOGY | Facility: CLINIC | Age: 62
End: 2022-10-25

## 2022-11-03 ENCOUNTER — APPOINTMENT (OUTPATIENT)
Dept: OTOLARYNGOLOGY | Facility: CLINIC | Age: 62
End: 2022-11-03

## 2022-11-03 VITALS
HEIGHT: 73 IN | WEIGHT: 212 LBS | TEMPERATURE: 98.2 F | SYSTOLIC BLOOD PRESSURE: 112 MMHG | BODY MASS INDEX: 28.1 KG/M2 | DIASTOLIC BLOOD PRESSURE: 82 MMHG | HEART RATE: 91 BPM

## 2022-11-03 PROCEDURE — 99213 OFFICE O/P EST LOW 20 MIN: CPT

## 2022-11-03 RX ORDER — AMOXICILLIN AND CLAVULANATE POTASSIUM 875; 125 MG/1; MG/1
875-125 TABLET, COATED ORAL
Qty: 28 | Refills: 0 | Status: COMPLETED | COMMUNITY
Start: 2022-09-29 | End: 2022-11-03

## 2022-11-03 NOTE — HISTORY OF PRESENT ILLNESS
[de-identified] : 62 year old male presents for follow up for left jaw pain. States has a constant tingle on the left side of lower jaw. Denies difficulty or pain chewing, headaches, left otalgia, left neck pain, hearing loss, otorrhea,fevers, or ear infections. \par

## 2022-11-03 NOTE — CONSULT LETTER
[Dear  ___] : Dear  [unfilled], [Consult Letter:] : I had the pleasure of evaluating your patient, [unfilled]. [Please see my note below.] : Please see my note below. [Consult Closing:] : Thank you very much for allowing me to participate in the care of this patient.  If you have any questions, please do not hesitate to contact me. [Sincerely,] : Sincerely, [FreeTextEntry2] : Buddy Farah MD ([facility] physician)\par  [FreeTextEntry3] : Ra Benitez MD, FACS\par Chief of Otolaryngology and Head & Neck Surgery\par [facility]\par  - Dept. of Otolaryngology\par [employer] [facility] [employer]\par \par  [DrEric  ___] : Dr. MUNOZ [DrEric ___] : Dr. MUNOZ

## 2022-11-08 ENCOUNTER — RX RENEWAL (OUTPATIENT)
Age: 62
End: 2022-11-08

## 2022-11-16 ENCOUNTER — APPOINTMENT (OUTPATIENT)
Dept: INTERNAL MEDICINE | Facility: CLINIC | Age: 62
End: 2022-11-16

## 2022-11-16 ENCOUNTER — APPOINTMENT (OUTPATIENT)
Age: 62
End: 2022-11-16

## 2022-11-16 VITALS
HEIGHT: 73 IN | WEIGHT: 212 LBS | BODY MASS INDEX: 28.1 KG/M2 | DIASTOLIC BLOOD PRESSURE: 90 MMHG | HEART RATE: 77 BPM | SYSTOLIC BLOOD PRESSURE: 140 MMHG | OXYGEN SATURATION: 96 %

## 2022-11-16 DIAGNOSIS — M27.2 INFLAMMATORY CONDITIONS OF JAWS: ICD-10-CM

## 2022-11-16 DIAGNOSIS — M87.9 OSTEONECROSIS, UNSPECIFIED: ICD-10-CM

## 2022-11-16 PROCEDURE — 99215 OFFICE O/P EST HI 40 MIN: CPT

## 2022-11-16 NOTE — HISTORY OF PRESENT ILLNESS
[FreeTextEntry1] :  62 year old male with medical History significant for prostate cancer with bone\par mets presents to [facility]  with jaw pain that started on the left and then pain progressed\par to the chin and then ear and behind his eye and headache and was getting worse\par over past two weeks. CT with findings consistent with extensive chronic\par osteomyelitis with soft tissue\par sequestrum adjacent to the mandible and surrounding the maxilla \par  . Blood cultures negative. Patient\par treated with IV antibiotics. Patient had PICC placed and PLAN IV INVANZ  THROUGH 9/27  with IMPROVEMENT \par LAST SEEN ON 9 29 AND PLACED ON ORAL ABX AUGMENTIN \par HAS COMPLETED ORAL ABX AND OFF ABX FOR OVER TWO WEEKS \par HERE FOR  INFECTIOUS DISEASE  FOLLOWUP \par  \par

## 2022-11-16 NOTE — REVIEW OF SYSTEMS
[As Noted in HPI] : as noted in HPI [Negative] : Heme/Lymph [FreeTextEntry4] : TINGLING SENDATION OVER JAW

## 2022-11-16 NOTE — REASON FOR VISIT
[Follow-Up: _____] : a [unfilled] follow-up visit [FreeTextEntry1] : OSTEOMYELITIS AND OSTEONECROSIS OF JAW

## 2022-11-16 NOTE — ASSESSMENT
[FreeTextEntry1] :  62 year old male with medical History significant for prostate cancer with bone\par mets presents to [facility]  with jaw pain that started on the left and then pain progressed\par to the chin and then ear and behind his eye and headache and was getting worse\par over past two weeks. CT with findings consistent with extensive chronic\par osteomyelitis with soft tissue\par sequestrum adjacent to the mandible and surrounding the maxilla \par  . Blood cultures negative. Patient\par treated with IV antibiotics. Patient had PICC placed and PLAN IV INVANZ  THROUGH 9/27  with IMPROVEMENT \par LAST SEEN ON 9 29 AND PLACED ON ORAL ABX AUGMENTIN \par HAS COMPLETED ORAL ABX AND OFF ABX FOR OVER TWO WEEKS \par HERE FOR  INFECTIOUS DISEASE  FOLLOWUP \par  PT FEELS WELL CT SCAN 9/26 IMPROVED NO ABSCESS\par HAS SEEN ENT\par PT HAS BEEN OFF ABX WITH NO EVIDENCE OF RECURRENCE OF INFECTION\par HAS SOME TINGLING STILL OVER JAW\par WILL DEFER Further ABX\par FOLLOWUP WITH ENT  \par AND ORAL SURGERY \par FOLLOW UP HERE AS NEEDED

## 2022-11-16 NOTE — PHYSICAL EXAM
[General Appearance - Alert] : alert [General Appearance - In No Acute Distress] : in no acute distress [Sclera] : the sclera and conjunctiva were normal [PERRL With Normal Accommodation] : pupils were equal in size, round, reactive to light [Extraocular Movements] : extraocular movements were intact [Outer Ear] : the ears and nose were normal in appearance [Oropharynx] : the oropharynx was normal with no thrush [FreeTextEntry1] : DECREASED LEFT MANDIBULAR EDEMA NON TNEDER TO PALPATION  [Neck Appearance] : the appearance of the neck was normal [Neck Cervical Mass (___cm)] : no neck mass was observed [Jugular Venous Distention Increased] : there was no jugular-venous distention [Thyroid Diffuse Enlargement] : the thyroid was not enlarged [Full Pulse] : the pedal pulses are present [Edema] : there was no peripheral edema [Bowel Sounds] : normal bowel sounds [Abdomen Soft] : soft [Abdomen Tenderness] : non-tender [Costovertebral Angle Tenderness] : no CVA tenderness [Abdomen Mass (___ Cm)] : no abdominal mass palpated [No Palpable Adenopathy] : no palpable adenopathy [Musculoskeletal - Swelling] : no joint swelling [Nail Clubbing] : no clubbing  or cyanosis of the fingernails [Motor Tone] : muscle strength and tone were normal [Skin Color & Pigmentation] : normal skin color and pigmentation [] : no rash [Deep Tendon Reflexes (DTR)] : deep tendon reflexes were 2+ and symmetric [Sensation] : the sensory exam was normal to light touch and pinprick [No Focal Deficits] : no focal deficits

## 2022-11-21 ENCOUNTER — APPOINTMENT (OUTPATIENT)
Dept: HEMATOLOGY ONCOLOGY | Facility: CLINIC | Age: 62
End: 2022-11-21

## 2022-11-21 ENCOUNTER — RESULT REVIEW (OUTPATIENT)
Age: 62
End: 2022-11-21

## 2022-11-21 LAB
BASOPHILS # BLD AUTO: 0.1 K/UL — SIGNIFICANT CHANGE UP (ref 0–0.2)
BASOPHILS NFR BLD AUTO: 0.9 % — SIGNIFICANT CHANGE UP (ref 0–2)
EOSINOPHIL # BLD AUTO: 0 K/UL — SIGNIFICANT CHANGE UP (ref 0–0.5)
EOSINOPHIL NFR BLD AUTO: 0.4 % — SIGNIFICANT CHANGE UP (ref 0–6)
HCT VFR BLD CALC: 43 % — SIGNIFICANT CHANGE UP (ref 39–50)
HGB BLD-MCNC: 13.6 G/DL — SIGNIFICANT CHANGE UP (ref 13–17)
LYMPHOCYTES # BLD AUTO: 1.7 K/UL — SIGNIFICANT CHANGE UP (ref 1–3.3)
LYMPHOCYTES # BLD AUTO: 18.1 % — SIGNIFICANT CHANGE UP (ref 13–44)
MCHC RBC-ENTMCNC: 27.5 PG — SIGNIFICANT CHANGE UP (ref 27–34)
MCHC RBC-ENTMCNC: 31.6 G/DL — LOW (ref 32–36)
MCV RBC AUTO: 86.8 FL — SIGNIFICANT CHANGE UP (ref 80–100)
MONOCYTES # BLD AUTO: 0.8 K/UL — SIGNIFICANT CHANGE UP (ref 0–0.9)
MONOCYTES NFR BLD AUTO: 8.4 % — SIGNIFICANT CHANGE UP (ref 2–14)
NEUTROPHILS # BLD AUTO: 6.8 K/UL — SIGNIFICANT CHANGE UP (ref 1.8–7.4)
NEUTROPHILS NFR BLD AUTO: 72.2 % — SIGNIFICANT CHANGE UP (ref 43–77)
PLATELET # BLD AUTO: 249 K/UL — SIGNIFICANT CHANGE UP (ref 150–400)
RBC # BLD: 4.95 M/UL — SIGNIFICANT CHANGE UP (ref 4.2–5.8)
RBC # FLD: 14.8 % — HIGH (ref 10.3–14.5)
WBC # BLD: 9.4 K/UL — SIGNIFICANT CHANGE UP (ref 3.8–10.5)
WBC # FLD AUTO: 9.4 K/UL — SIGNIFICANT CHANGE UP (ref 3.8–10.5)

## 2022-11-22 LAB
ALBUMIN SERPL ELPH-MCNC: 4.1 G/DL
ALP BLD-CCNC: 119 U/L
ALT SERPL-CCNC: 8 U/L
ANION GAP SERPL CALC-SCNC: 9 MMOL/L
AST SERPL-CCNC: 13 U/L
BILIRUB SERPL-MCNC: 0.2 MG/DL
BUN SERPL-MCNC: 20 MG/DL
CALCIUM SERPL-MCNC: 11.2 MG/DL
CHLORIDE SERPL-SCNC: 104 MMOL/L
CO2 SERPL-SCNC: 30 MMOL/L
CREAT SERPL-MCNC: 1.5 MG/DL
EGFR: 52 ML/MIN/1.73M2
GLUCOSE SERPL-MCNC: 96 MG/DL
LDH SERPL-CCNC: 207 U/L
POTASSIUM SERPL-SCNC: 4.8 MMOL/L
PROT SERPL-MCNC: 7 G/DL
PSA SERPL-MCNC: <0.01 NG/ML
SODIUM SERPL-SCNC: 144 MMOL/L
TESTOST SERPL-MCNC: <2.5 NG/DL

## 2022-11-23 ENCOUNTER — APPOINTMENT (OUTPATIENT)
Age: 62
End: 2022-11-23

## 2022-12-09 ENCOUNTER — APPOINTMENT (OUTPATIENT)
Dept: COLORECTAL SURGERY | Facility: CLINIC | Age: 62
End: 2022-12-09

## 2022-12-09 VITALS
BODY MASS INDEX: 28.1 KG/M2 | HEIGHT: 73 IN | DIASTOLIC BLOOD PRESSURE: 90 MMHG | WEIGHT: 212 LBS | HEART RATE: 91 BPM | SYSTOLIC BLOOD PRESSURE: 157 MMHG

## 2022-12-09 DIAGNOSIS — R19.4 CHANGE IN BOWEL HABIT: ICD-10-CM

## 2022-12-09 DIAGNOSIS — K92.2 GASTROINTESTINAL HEMORRHAGE, UNSPECIFIED: ICD-10-CM

## 2022-12-09 PROCEDURE — 46600 DIAGNOSTIC ANOSCOPY SPX: CPT

## 2022-12-09 PROCEDURE — 99203 OFFICE O/P NEW LOW 30 MIN: CPT | Mod: 25

## 2022-12-09 RX ORDER — POLYETHYLENE GLYCOL-3350 AND ELECTROLYTES WITH FLAVOR PACK 240; 5.84; 2.98; 6.72; 22.72 G/278.26G; G/278.26G; G/278.26G; G/278.26G; G/278.26G
240 POWDER, FOR SOLUTION ORAL
Qty: 1 | Refills: 0 | Status: ACTIVE | COMMUNITY
Start: 2022-12-09 | End: 1900-01-01

## 2022-12-10 PROBLEM — K92.2 GASTROINTESTINAL BLEEDING: Status: ACTIVE | Noted: 2022-12-10

## 2022-12-10 PROBLEM — R19.4 CHANGE IN BOWEL HABITS: Status: ACTIVE | Noted: 2022-12-10

## 2022-12-10 NOTE — REVIEW OF SYSTEMS
[As Noted in HPI] : as noted in HPI [Constipation] : constipation [Negative] : Heme/Lymph [FreeTextEntry7] : bleeding

## 2022-12-10 NOTE — PROCEDURE
[FreeTextEntry1] : after consent, anoscopy was performed. shows multiple grade 3 internal and external hemmorhoids.

## 2022-12-10 NOTE — ASSESSMENT
[FreeTextEntry1] : 62 year old male with changes in bowel habits and bleeding. found to have large hemorrhoids. recommend colonoscopy. risk and benefits explained including bleeding, perforation, missing a polyp or cancer in 5%.\par recommend rubber band ligation of hemorrhoids.

## 2022-12-10 NOTE — CONSULT LETTER
[Dear  ___] : Dear  [unfilled], [Consult Letter:] : I had the pleasure of evaluating your patient, [unfilled]. [Please see my note below.] : Please see my note below. [Consult Closing:] : Thank you very much for allowing me to participate in the care of this patient.  If you have any questions, please do not hesitate to contact me. [Sincerely,] : Sincerely, [FreeTextEntry3] : Cj Rueda MD

## 2022-12-10 NOTE — HISTORY OF PRESENT ILLNESS
[FreeTextEntry1] : consultation requested by Dr. Farah for changes in bowel habits and bleeding with bowel movements. 62 year old male with complaints of changes in bowel habits and bleeding. symptoms have worsened. never had a colonoscopy.

## 2022-12-21 ENCOUNTER — RX RENEWAL (OUTPATIENT)
Age: 62
End: 2022-12-21

## 2023-01-07 ENCOUNTER — OFFICE (OUTPATIENT)
Dept: URBAN - METROPOLITAN AREA CLINIC 104 | Facility: CLINIC | Age: 63
Setting detail: OPHTHALMOLOGY
End: 2023-01-07
Payer: COMMERCIAL

## 2023-01-07 ENCOUNTER — RX RENEWAL (OUTPATIENT)
Age: 63
End: 2023-01-07

## 2023-01-07 DIAGNOSIS — H52.213: ICD-10-CM

## 2023-01-07 DIAGNOSIS — H25.13: ICD-10-CM

## 2023-01-07 DIAGNOSIS — H40.1122: ICD-10-CM

## 2023-01-07 PROCEDURE — 99213 OFFICE O/P EST LOW 20 MIN: CPT | Performed by: OPHTHALMOLOGY

## 2023-01-07 PROCEDURE — 92020 GONIOSCOPY: CPT | Performed by: OPHTHALMOLOGY

## 2023-01-07 ASSESSMENT — SPHEQUIV_DERIVED
OD_SPHEQUIV: -1.625
OS_SPHEQUIV: -1.5

## 2023-01-07 ASSESSMENT — REFRACTION_MANIFEST
OD_AXIS: 60
OS_SPHERE: -1.25
OD_VA1: 20/20
OD_SPHERE: -1.25
OS_VA1: 20/25-
OD_CYLINDER: -0.75
OS_AXIS: 135
OS_CYLINDER: -0.50

## 2023-01-07 ASSESSMENT — TONOMETRY
OD_IOP_MMHG: 16
OS_IOP_MMHG: 16

## 2023-01-07 ASSESSMENT — VISUAL ACUITY
OD_BCVA: 20/25-
OS_BCVA: 20/25

## 2023-01-07 ASSESSMENT — CONFRONTATIONAL VISUAL FIELD TEST (CVF)
OS_FINDINGS: FULL
OD_FINDINGS: FULL

## 2023-01-23 NOTE — PATIENT PROFILE ADULT - OVER THE PAST TWO WEEKS HAVE YOU FELT DOWN, DEPRESSED OR HOPELESS?
RN called patient regarding her mychart message.       Patient stated that she is lactose intolerance and is needing lactacid pill. Patient has not discussed this concern with any provider before.      RN will route this encounter to EMILY, Dr. Rodriguez, to review and advise.    "[employer] #[id number] - [place], [place] - [address] [facility] VANDANA Segovia RN  [facility]   no

## 2023-02-01 ENCOUNTER — APPOINTMENT (OUTPATIENT)
Dept: CT IMAGING | Facility: CLINIC | Age: 63
End: 2023-02-01
Payer: COMMERCIAL

## 2023-02-01 ENCOUNTER — OUTPATIENT (OUTPATIENT)
Dept: OUTPATIENT SERVICES | Facility: HOSPITAL | Age: 63
LOS: 1 days | End: 2023-02-01

## 2023-02-01 DIAGNOSIS — M87.9 OSTEONECROSIS, UNSPECIFIED: ICD-10-CM

## 2023-02-01 PROCEDURE — 70486 CT MAXILLOFACIAL W/O DYE: CPT | Mod: 26

## 2023-02-03 ENCOUNTER — NON-APPOINTMENT (OUTPATIENT)
Age: 63
End: 2023-02-03

## 2023-02-03 ENCOUNTER — APPOINTMENT (OUTPATIENT)
Dept: INTERNAL MEDICINE | Facility: CLINIC | Age: 63
End: 2023-02-03
Payer: COMMERCIAL

## 2023-02-03 VITALS — RESPIRATION RATE: 12 BRPM | DIASTOLIC BLOOD PRESSURE: 86 MMHG | HEART RATE: 78 BPM | SYSTOLIC BLOOD PRESSURE: 136 MMHG

## 2023-02-03 DIAGNOSIS — I10 ESSENTIAL (PRIMARY) HYPERTENSION: ICD-10-CM

## 2023-02-03 DIAGNOSIS — Z00.00 ENCOUNTER FOR GENERAL ADULT MEDICAL EXAMINATION W/OUT ABNORMAL FINDINGS: ICD-10-CM

## 2023-02-03 PROCEDURE — 36415 COLL VENOUS BLD VENIPUNCTURE: CPT

## 2023-02-03 PROCEDURE — 93000 ELECTROCARDIOGRAM COMPLETE: CPT

## 2023-02-03 PROCEDURE — 99396 PREV VISIT EST AGE 40-64: CPT | Mod: 25

## 2023-02-03 NOTE — HISTORY OF PRESENT ILLNESS
[FreeTextEntry1] : For CPE [de-identified] : For CPE\par has been well\par has h istory or prostate cancer receiving treatment\par saw Dr. Rueda for rectal bleeding colonscopy planned

## 2023-02-03 NOTE — HEALTH RISK ASSESSMENT
[Excellent] : ~his/her~ current health as excellent [Yes] : Yes [Monthly or less (1 pt)] : Monthly or less (1 point) [No falls in past year] : Patient reported no falls in the past year [0] : 1) Little interest or pleasure doing things: Not at all (0) [1] : 2) Feeling down, depressed, or hopeless for several days (1) [PHQ-2 Negative - No further assessment needed] : PHQ-2 Negative - No further assessment needed [[id number]] : 1 [HIV test declined] : HIV test declined [Hepatitis C test declined] : Hepatitis C test declined [Change in mental status noted] : No change in mental status noted [Language] : denies difficulty with language [Behavior] : denies difficulty with behavior [Learning/Retaining New Information] : denies difficulty learning/retaining new information [Handling Complex Tasks] : denies difficulty handling complex tasks [Reasoning] : denies difficulty with reasoning [Spatial Ability and Orientation] : denies difficulty with spatial ability and orientation [None] : None [With Significant Other] : lives with significant other [Employed] : employed [High School] : high school [] :  [Sexually Active] : not sexually active [High Risk Behavior] : no high risk behavior [Feels Safe at Home] : Feels safe at home [Fully functional (bathing, dressing, toileting, transferring, walking, feeding)] : Fully functional (bathing, dressing, toileting, transferring, walking, feeding) [Fully functional (using the telephone, shopping, preparing meals, housekeeping, doing laundry, using] : Fully functional and needs no help or supervision to perform IADLs (using the telephone, shopping, preparing meals, housekeeping, doing laundry, using transportation, managing medications and managing finances) [Reports changes in hearing] : Reports no changes in hearing [Reports changes in vision] : Reports no changes in vision [Reports normal functional visual acuity (ie: able to read med bottle)] : Reports poor functional visual acuity.  [Reports changes in dental health] : Reports no changes in dental health [Smoke Detector] : smoke detector [Carbon Monoxide Detector] : carbon monoxide detector [Guns at Home] : no guns at home [Safety elements used in home] : safety elements used in home [Seat Belt] :  uses seat belt [Sunscreen] : does not use sunscreen [Travel to Developing Areas] : does not  travel to developing areas [TB Exposure] : is not being exposed to tuberculosis [Caregiver Concerns] : does not have caregiver concerns [ColonoscopyDate] : due [AdvancecareDate] : 2/3/23

## 2023-02-04 LAB
ALBUMIN SERPL ELPH-MCNC: 4.3 G/DL
ALP BLD-CCNC: 108 U/L
ALT SERPL-CCNC: 10 U/L
ANION GAP SERPL CALC-SCNC: 11 MMOL/L
APPEARANCE: CLEAR
AST SERPL-CCNC: 12 U/L
BASOPHILS # BLD AUTO: 0.05 K/UL
BASOPHILS NFR BLD AUTO: 0.7 %
BILIRUB SERPL-MCNC: 0.2 MG/DL
BILIRUBIN URINE: NEGATIVE
BLOOD URINE: NEGATIVE
BUN SERPL-MCNC: 17 MG/DL
CALCIUM SERPL-MCNC: 10 MG/DL
CHLORIDE SERPL-SCNC: 104 MMOL/L
CHOLEST SERPL-MCNC: 251 MG/DL
CO2 SERPL-SCNC: 27 MMOL/L
COLOR: YELLOW
CREAT SERPL-MCNC: 1.27 MG/DL
CREAT SPEC-SCNC: 246 MG/DL
EGFR: 63 ML/MIN/1.73M2
EOSINOPHIL # BLD AUTO: 0.08 K/UL
EOSINOPHIL NFR BLD AUTO: 1.1 %
ESTIMATED AVERAGE GLUCOSE: 126 MG/DL
GLUCOSE QUALITATIVE U: NEGATIVE
GLUCOSE SERPL-MCNC: 99 MG/DL
HBA1C MFR BLD HPLC: 6 %
HCT VFR BLD CALC: 43.3 %
HDLC SERPL-MCNC: 50 MG/DL
HGB BLD-MCNC: 13.2 G/DL
IMM GRANULOCYTES NFR BLD AUTO: 1.9 %
KETONES URINE: NEGATIVE
LDLC SERPL CALC-MCNC: 174 MG/DL
LEUKOCYTE ESTERASE URINE: NEGATIVE
LYMPHOCYTES # BLD AUTO: 1.09 K/UL
LYMPHOCYTES NFR BLD AUTO: 14.4 %
MAN DIFF?: NORMAL
MCHC RBC-ENTMCNC: 27.4 PG
MCHC RBC-ENTMCNC: 30.5 GM/DL
MCV RBC AUTO: 90 FL
MICROALBUMIN 24H UR DL<=1MG/L-MCNC: 3 MG/DL
MICROALBUMIN/CREAT 24H UR-RTO: 12 MG/G
MONOCYTES # BLD AUTO: 1.12 K/UL
MONOCYTES NFR BLD AUTO: 14.8 %
NEUTROPHILS # BLD AUTO: 5.07 K/UL
NEUTROPHILS NFR BLD AUTO: 67.1 %
NITRITE URINE: NEGATIVE
NONHDLC SERPL-MCNC: 201 MG/DL
PH URINE: 5.5
PLATELET # BLD AUTO: 255 K/UL
POTASSIUM SERPL-SCNC: 3.7 MMOL/L
PROT SERPL-MCNC: 6.8 G/DL
PROTEIN URINE: NORMAL
PSA SERPL-MCNC: <0.01 NG/ML
RBC # BLD: 4.81 M/UL
RBC # FLD: 14.2 %
SODIUM SERPL-SCNC: 143 MMOL/L
SPECIFIC GRAVITY URINE: 1.03
T4 FREE SERPL-MCNC: 1.3 NG/DL
TRIGL SERPL-MCNC: 138 MG/DL
TSH SERPL-ACNC: 1.04 UIU/ML
UROBILINOGEN URINE: NORMAL
WBC # FLD AUTO: 7.55 K/UL

## 2023-02-08 DIAGNOSIS — Z01.818 ENCOUNTER FOR OTHER PREPROCEDURAL EXAMINATION: ICD-10-CM

## 2023-02-13 ENCOUNTER — OUTPATIENT (OUTPATIENT)
Dept: OUTPATIENT SERVICES | Facility: HOSPITAL | Age: 63
LOS: 1 days | Discharge: HOME | End: 2023-02-13

## 2023-02-13 ENCOUNTER — NON-APPOINTMENT (OUTPATIENT)
Age: 63
End: 2023-02-13

## 2023-02-13 DIAGNOSIS — C61 MALIGNANT NEOPLASM OF PROSTATE: ICD-10-CM

## 2023-02-16 ENCOUNTER — APPOINTMENT (OUTPATIENT)
Dept: COLORECTAL SURGERY | Facility: AMBULATORY MEDICAL SERVICES | Age: 63
End: 2023-02-16
Payer: COMMERCIAL

## 2023-02-16 LAB — SARS-COV-2 N GENE NPH QL NAA+PROBE: NOT DETECTED

## 2023-02-16 PROCEDURE — 45378 DIAGNOSTIC COLONOSCOPY: CPT

## 2023-02-23 ENCOUNTER — APPOINTMENT (OUTPATIENT)
Dept: HEMATOLOGY ONCOLOGY | Facility: CLINIC | Age: 63
End: 2023-02-23

## 2023-02-25 ENCOUNTER — TRANSCRIPTION ENCOUNTER (OUTPATIENT)
Age: 63
End: 2023-02-25

## 2023-03-02 ENCOUNTER — RESULT REVIEW (OUTPATIENT)
Age: 63
End: 2023-03-02

## 2023-03-02 ENCOUNTER — APPOINTMENT (OUTPATIENT)
Age: 63
End: 2023-03-02

## 2023-03-02 LAB
BASOPHILS # BLD AUTO: 0.1 K/UL — SIGNIFICANT CHANGE UP (ref 0–0.2)
BASOPHILS NFR BLD AUTO: 0.8 % — SIGNIFICANT CHANGE UP (ref 0–2)
EOSINOPHIL # BLD AUTO: 0.1 K/UL — SIGNIFICANT CHANGE UP (ref 0–0.5)
EOSINOPHIL NFR BLD AUTO: 0.6 % — SIGNIFICANT CHANGE UP (ref 0–6)
HCT VFR BLD CALC: 40.4 % — SIGNIFICANT CHANGE UP (ref 39–50)
HGB BLD-MCNC: 13.5 G/DL — SIGNIFICANT CHANGE UP (ref 13–17)
LYMPHOCYTES # BLD AUTO: 1.4 K/UL — SIGNIFICANT CHANGE UP (ref 1–3.3)
LYMPHOCYTES # BLD AUTO: 17.4 % — SIGNIFICANT CHANGE UP (ref 13–44)
MCHC RBC-ENTMCNC: 28.3 PG — SIGNIFICANT CHANGE UP (ref 27–34)
MCHC RBC-ENTMCNC: 33.4 G/DL — SIGNIFICANT CHANGE UP (ref 32–36)
MCV RBC AUTO: 84.8 FL — SIGNIFICANT CHANGE UP (ref 80–100)
MONOCYTES # BLD AUTO: 0.9 K/UL — SIGNIFICANT CHANGE UP (ref 0–0.9)
MONOCYTES NFR BLD AUTO: 11 % — SIGNIFICANT CHANGE UP (ref 2–14)
NEUTROPHILS # BLD AUTO: 5.5 K/UL — SIGNIFICANT CHANGE UP (ref 1.8–7.4)
NEUTROPHILS NFR BLD AUTO: 70.2 % — SIGNIFICANT CHANGE UP (ref 43–77)
PLATELET # BLD AUTO: 236 K/UL — SIGNIFICANT CHANGE UP (ref 150–400)
RBC # BLD: 4.76 M/UL — SIGNIFICANT CHANGE UP (ref 4.2–5.8)
RBC # FLD: 13.2 % — SIGNIFICANT CHANGE UP (ref 10.3–14.5)
WBC # BLD: 7.9 K/UL — SIGNIFICANT CHANGE UP (ref 3.8–10.5)
WBC # FLD AUTO: 7.9 K/UL — SIGNIFICANT CHANGE UP (ref 3.8–10.5)

## 2023-03-02 RX ORDER — BICALUTAMIDE 50 MG/1
1 TABLET, FILM COATED ORAL
Qty: 0 | Refills: 0 | DISCHARGE

## 2023-03-02 RX ORDER — FENTANYL CITRATE 50 UG/ML
1 INJECTION INTRAVENOUS
Qty: 0 | Refills: 0 | DISCHARGE

## 2023-03-03 DIAGNOSIS — C79.51 SECONDARY MALIGNANT NEOPLASM OF BONE: ICD-10-CM

## 2023-03-03 LAB
ALBUMIN SERPL ELPH-MCNC: 4.3 G/DL
ALP BLD-CCNC: 89 U/L
ALT SERPL-CCNC: 13 U/L
ANION GAP SERPL CALC-SCNC: 11 MMOL/L
AST SERPL-CCNC: 14 U/L
BILIRUB SERPL-MCNC: 0.2 MG/DL
BUN SERPL-MCNC: 13 MG/DL
CALCIUM SERPL-MCNC: 10.1 MG/DL
CHLORIDE SERPL-SCNC: 107 MMOL/L
CO2 SERPL-SCNC: 25 MMOL/L
CREAT SERPL-MCNC: 1.31 MG/DL
EGFR: 61 ML/MIN/1.73M2
GLUCOSE SERPL-MCNC: 86 MG/DL
POTASSIUM SERPL-SCNC: 4.6 MMOL/L
PROT SERPL-MCNC: 6.9 G/DL
PSA SERPL-MCNC: <0.01 NG/ML
SODIUM SERPL-SCNC: 143 MMOL/L

## 2023-03-13 ENCOUNTER — RX RENEWAL (OUTPATIENT)
Age: 63
End: 2023-03-13

## 2023-03-22 ENCOUNTER — RX RENEWAL (OUTPATIENT)
Age: 63
End: 2023-03-22

## 2023-03-29 ENCOUNTER — APPOINTMENT (OUTPATIENT)
Dept: HEMATOLOGY ONCOLOGY | Facility: CLINIC | Age: 63
End: 2023-03-29
Payer: COMMERCIAL

## 2023-03-29 VITALS
OXYGEN SATURATION: 96 % | HEIGHT: 73 IN | WEIGHT: 217 LBS | DIASTOLIC BLOOD PRESSURE: 81 MMHG | SYSTOLIC BLOOD PRESSURE: 145 MMHG | BODY MASS INDEX: 28.76 KG/M2 | HEART RATE: 77 BPM

## 2023-03-29 PROCEDURE — 99215 OFFICE O/P EST HI 40 MIN: CPT

## 2023-04-06 RX ORDER — CALCIUM CARBONATE/VITAMIN D3 600 MG-10
600-10 TABLET ORAL
Qty: 30 | Refills: 0 | Status: ACTIVE | COMMUNITY
Start: 2021-10-06 | End: 1900-01-01

## 2023-04-13 NOTE — ASSESSMENT
[FreeTextEntry1] : 64 y/o male with prostate cancer metastatic to bone. 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

## 2023-04-13 NOTE — ADDENDUM
[FreeTextEntry1] : Documented by Annamarie Steiner acting as scribe for Dr. Aponte on 03/29/2023.\par \par All Medical record entries made by the Scribe were at my, Dr. Aponte, direction and personally dictated by me on 03/29/2023. I have reviewed the chart and agree that the record accurately reflects my personal performance of the history, physical exam, assessment and plan. I have also personally directed, reviewed, and agreed with the discharge instructions.

## 2023-04-13 NOTE — HISTORY OF PRESENT ILLNESS
[T: ___] : T[unfilled] [N: ___] : N[unfilled] [M: ___] : M[unfilled] [AJCC Stage: ____] : AJCC Stage: [unfilled] [de-identified] : Mr. Colt Cedeno is a 60 year old male who presents for a follow up visit for severe advanced prostate cancer with painful bone mets. He was diagnosed with prostate cancer by urologist Dr. Dylon Franklin. He was admitted to the emergency room on 12/6/18 for increasing lower back pain and difficulty ambulating, with significant improvements from fentanyl (patch). He is s/p palliative radiation therapy to spine. Began Lupron and Casodex on recent [facility] admission, and was started on ketoconazole by Urology. He has since transitioned to Lupron/Zytiga/Prednisone hormonal therapy.\par \par Bone scan 11/4/2020\par -Multifocal osseous metastases in the spine, scapulae, b/l ribs and pelvic bones, similar in distribution but significantly decreased in extent and intensity , compared to bone scan of 11/2018\par - no new bony lesions\par \par  [de-identified] : Patient doing well, has no acute complaints. Patient tolerating Lupron, Xgeva, Zytiga, and Prednisone with minimal side effects.States energy level and appetite are good. Admits mild low back pain at end of day but resolves next day.  Admits daily hot flashes. States the hot flashes can wake him up out of his sleep. Denies dysuria, hematuria, fever/chills, fatigue,nausea, vomiting, diarrhea,constipation,  abdominal pain, bleeding, easy bruising or visual changes, chest pain,  SOB or LEAL,  LE edema.\par \par 10/6/21: Colt is here for follow up. He reports that he continues to have hot flashes despite taking Effexor for the past several months. States the hot flashes can wake him up out of his sleep.  Mild fatigue. Appetite is good. Lost 8 lbs in the past 5 months intentionally. Denies HA. CP, SOB, abd pain, constipation, diarrhea, melena, hematuria, dysuria. \par \par 2/9/22: Colt is here for follow up. He reports that he continues to have hot flashes despite taking Effexor for the past several months. States the hot flashes can wake him up out of his sleep.  Mild fatigue. occasional migraine that resolves on its own. Continues to work as . Denies HA. CP, SOB, abd pain, constipation, diarrhea, melena, hematuria, dysuria. \par \par 5/11/22: Patient presents on Eligard, Xgeva, Zytiga and Prednisone for prostate cancer metastatic to bone. \par + HTN with occasional headaches. + Hot flashes and diaphoresis. + Mild fatigue. + Cough. + Left ear ache. No edema, N/V/D/C, dyspepsia, arthralgia/myalgia, fever, cough or SOB.\par \par 8/22/22:Patient presents on Eligard, Xgeva, Zytiga and Prednisone for prostate cancer metastatic to bone.   \par Pt was hospitalized last week for left lower jaw pain. Found to have osteomyelitis/osteonecrosis. Pt is on IV abx for 6 weeks. Able to eat regular food. Pt reports he had multiple teeth extractions last year. No oral surgery. Concerning for osteonecrosis of jaw given pt is on treatment with Xgeva.  Pt restarted taking his meds. + hot flashes.\par \par 9/30/22: Spoke to pt via TTM. Patient presents on Eligard, Xgeva, Zytiga and Prednisone for prostate cancer metastatic to bone.\par Pt has N/V,  after eating mall food. Getting better. Also had flu shot.  pt's jaw swelling and pain resolved. He is to complete abx course and will remain off Xgeva. + hot flashes. No dizziness\par \par 03/29/2023 Returns for follow up visit. Transferring care from Dr. Guy. Continues on Eligard, and Zytiga/Prednisone\par Reports stopped Xgeva d/t ONJ ~8/2022. Reports recent colonoscopy. Reports intermittent hot flashes, worse on days when he forgets to take Venlafaxine. Feels well overall. \par

## 2023-05-08 ENCOUNTER — RX RENEWAL (OUTPATIENT)
Age: 63
End: 2023-05-08

## 2023-05-21 ENCOUNTER — OUTPATIENT (OUTPATIENT)
Dept: OUTPATIENT SERVICES | Facility: HOSPITAL | Age: 63
LOS: 1 days | Discharge: HOME | End: 2023-05-21

## 2023-05-21 DIAGNOSIS — C61 MALIGNANT NEOPLASM OF PROSTATE: ICD-10-CM

## 2023-05-26 NOTE — REASON FOR VISIT
[Annual Wellness Visit] : an annual wellness visit Simple: Patient demonstrates quick and easy understanding/Verbalized Understanding

## 2023-06-02 ENCOUNTER — RX RENEWAL (OUTPATIENT)
Age: 63
End: 2023-06-02

## 2023-06-07 ENCOUNTER — APPOINTMENT (OUTPATIENT)
Dept: HEMATOLOGY ONCOLOGY | Facility: CLINIC | Age: 63
End: 2023-06-07
Payer: COMMERCIAL

## 2023-06-07 ENCOUNTER — APPOINTMENT (OUTPATIENT)
Age: 63
End: 2023-06-07

## 2023-06-07 ENCOUNTER — RESULT REVIEW (OUTPATIENT)
Age: 63
End: 2023-06-07

## 2023-06-07 VITALS
OXYGEN SATURATION: 96 % | DIASTOLIC BLOOD PRESSURE: 85 MMHG | SYSTOLIC BLOOD PRESSURE: 126 MMHG | WEIGHT: 218 LBS | HEIGHT: 73 IN | BODY MASS INDEX: 28.89 KG/M2 | HEART RATE: 67 BPM

## 2023-06-07 LAB
BASOPHILS # BLD AUTO: 0.1 K/UL — SIGNIFICANT CHANGE UP (ref 0–0.2)
BASOPHILS NFR BLD AUTO: 1.1 % — SIGNIFICANT CHANGE UP (ref 0–2)
EOSINOPHIL # BLD AUTO: 0.1 K/UL — SIGNIFICANT CHANGE UP (ref 0–0.5)
EOSINOPHIL NFR BLD AUTO: 1 % — SIGNIFICANT CHANGE UP (ref 0–6)
HCT VFR BLD CALC: 39.6 % — SIGNIFICANT CHANGE UP (ref 39–50)
HGB BLD-MCNC: 12.9 G/DL — LOW (ref 13–17)
LYMPHOCYTES # BLD AUTO: 1.1 K/UL — SIGNIFICANT CHANGE UP (ref 1–3.3)
LYMPHOCYTES # BLD AUTO: 12.5 % — LOW (ref 13–44)
MCHC RBC-ENTMCNC: 27.4 PG — SIGNIFICANT CHANGE UP (ref 27–34)
MCHC RBC-ENTMCNC: 32.7 G/DL — SIGNIFICANT CHANGE UP (ref 32–36)
MCV RBC AUTO: 83.9 FL — SIGNIFICANT CHANGE UP (ref 80–100)
MONOCYTES # BLD AUTO: 0.8 K/UL — SIGNIFICANT CHANGE UP (ref 0–0.9)
MONOCYTES NFR BLD AUTO: 8.8 % — SIGNIFICANT CHANGE UP (ref 2–14)
NEUTROPHILS # BLD AUTO: 6.8 K/UL — SIGNIFICANT CHANGE UP (ref 1.8–7.4)
NEUTROPHILS NFR BLD AUTO: 76.6 % — SIGNIFICANT CHANGE UP (ref 43–77)
PLATELET # BLD AUTO: 226 K/UL — SIGNIFICANT CHANGE UP (ref 150–400)
RBC # BLD: 4.72 M/UL — SIGNIFICANT CHANGE UP (ref 4.2–5.8)
RBC # FLD: 13.4 % — SIGNIFICANT CHANGE UP (ref 10.3–14.5)
WBC # BLD: 8.8 K/UL — SIGNIFICANT CHANGE UP (ref 3.8–10.5)
WBC # FLD AUTO: 8.8 K/UL — SIGNIFICANT CHANGE UP (ref 3.8–10.5)

## 2023-06-07 PROCEDURE — 99214 OFFICE O/P EST MOD 30 MIN: CPT

## 2023-06-09 LAB
ALBUMIN SERPL ELPH-MCNC: 4.1 G/DL
ALP BLD-CCNC: 91 U/L
ALT SERPL-CCNC: 11 U/L
ANION GAP SERPL CALC-SCNC: 12 MMOL/L
AST SERPL-CCNC: 13 U/L
BILIRUB SERPL-MCNC: 0.3 MG/DL
BUN SERPL-MCNC: 17 MG/DL
CALCIUM SERPL-MCNC: 9.9 MG/DL
CHLORIDE SERPL-SCNC: 103 MMOL/L
CO2 SERPL-SCNC: 26 MMOL/L
CREAT SERPL-MCNC: 1.35 MG/DL
EGFR: 59 ML/MIN/1.73M2
GLUCOSE SERPL-MCNC: 89 MG/DL
POTASSIUM SERPL-SCNC: 4.4 MMOL/L
PROT SERPL-MCNC: 6.7 G/DL
PSA SERPL-MCNC: <0.01 NG/ML
SODIUM SERPL-SCNC: 141 MMOL/L

## 2023-06-09 NOTE — HISTORY OF PRESENT ILLNESS
[T: ___] : T[unfilled] [N: ___] : N[unfilled] [M: ___] : M[unfilled] [AJCC Stage: ____] : AJCC Stage: [unfilled] [de-identified] : Mr. Colt Cedeno is a 60 year old male who presents for a follow up visit for severe advanced prostate cancer with painful bone mets. He was diagnosed with prostate cancer by urologist Dr. Dylon Franklin. He was admitted to the emergency room on 12/6/18 for increasing lower back pain and difficulty ambulating, with significant improvements from fentanyl (patch). He is s/p palliative radiation therapy to spine. Began Lupron and Casodex on recent [facility] admission, and was started on ketoconazole by Urology. He has since transitioned to Lupron/Zytiga/Prednisone hormonal therapy.\par \par Bone scan 11/4/2020\par -Multifocal osseous metastases in the spine, scapulae, b/l ribs and pelvic bones, similar in distribution but significantly decreased in extent and intensity , compared to bone scan of 11/2018\par - no new bony lesions\par \par  [de-identified] : Patient doing well, has no acute complaints. Patient tolerating Lupron, Xgeva, Zytiga, and Prednisone with minimal side effects.States energy level and appetite are good. Admits mild low back pain at end of day but resolves next day.  Admits daily hot flashes. States the hot flashes can wake him up out of his sleep. Denies dysuria, hematuria, fever/chills, fatigue,nausea, vomiting, diarrhea,constipation,  abdominal pain, bleeding, easy bruising or visual changes, chest pain,  SOB or LEAL,  LE edema.\par \par 10/6/21: Colt is here for follow up. He reports that he continues to have hot flashes despite taking Effexor for the past several months. States the hot flashes can wake him up out of his sleep.  Mild fatigue. Appetite is good. Lost 8 lbs in the past 5 months intentionally. Denies HA. CP, SOB, abd pain, constipation, diarrhea, melena, hematuria, dysuria. \par \par 2/9/22: Colt is here for follow up. He reports that he continues to have hot flashes despite taking Effexor for the past several months. States the hot flashes can wake him up out of his sleep.  Mild fatigue. occasional migraine that resolves on its own. Continues to work as . Denies HA. CP, SOB, abd pain, constipation, diarrhea, melena, hematuria, dysuria. \par \par 5/11/22: Patient presents on Eligard, Xgeva, Zytiga and Prednisone for prostate cancer metastatic to bone. \par + HTN with occasional headaches. + Hot flashes and diaphoresis. + Mild fatigue. + Cough. + Left ear ache. No edema, N/V/D/C, dyspepsia, arthralgia/myalgia, fever, cough or SOB.\par \par 8/22/22:Patient presents on Eligard, Xgeva, Zytiga and Prednisone for prostate cancer metastatic to bone.   \par Pt was hospitalized last week for left lower jaw pain. Found to have osteomyelitis/osteonecrosis. Pt is on IV abx for 6 weeks. Able to eat regular food. Pt reports he had multiple teeth extractions last year. No oral surgery. Concerning for osteonecrosis of jaw given pt is on treatment with Xgeva.  Pt restarted taking his meds. + hot flashes.\par \par 9/30/22: Spoke to pt via TTM. Patient presents on Eligard, Xgeva, Zytiga and Prednisone for prostate cancer metastatic to bone.\par Pt has N/V,  after eating mall food. Getting better. Also had flu shot.  pt's jaw swelling and pain resolved. He is to complete abx course and will remain off Xgeva. + hot flashes. No dizziness\par \par 03/29/2023 Returns for follow up visit. Transferring care from Dr. Guy. Continues on Eligard, and Zytiga/Prednisone\par Reports stopped Xgeva d/t ONJ ~8/2022. Reports recent colonoscopy. Reports intermittent hot flashes, worse on days when he forgets to take Venlafaxine. Feels well overall.\par \par 6/9/23: Continues on Eligard, and Zytiga/Prednisone\par Reports stopped Xgeva d/t ONJ ~8/2022\par Patient with hot flashes, somewhat improved on Effexor\par Patient otherwise doing well\par  \par

## 2023-06-09 NOTE — ASSESSMENT
[FreeTextEntry1] : 64 y/o male with prostate cancer metastatic to bone. 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

## 2023-06-13 ENCOUNTER — RX RENEWAL (OUTPATIENT)
Age: 63
End: 2023-06-13

## 2023-06-13 RX ORDER — CALCIUM CARBONATE/VITAMIN D3 600 MG-10
600-10 TABLET ORAL
Qty: 30 | Refills: 3 | Status: ACTIVE | COMMUNITY
Start: 2022-10-19 | End: 1900-01-01

## 2023-07-07 ENCOUNTER — RX RENEWAL (OUTPATIENT)
Age: 63
End: 2023-07-07

## 2023-07-31 NOTE — ED ADULT NURSE NOTE - COVID-19  TEST TYPE
Detail Level: Detailed
Render In Strict Bullet Format?: No
Initiate Treatment: tacrolimus 0.1 % topical ointment IF biopsy shows dermatitis.
MOLECULAR PCR

## 2023-08-30 ENCOUNTER — OUTPATIENT (OUTPATIENT)
Dept: OUTPATIENT SERVICES | Facility: HOSPITAL | Age: 63
LOS: 1 days | Discharge: HOME | End: 2023-08-30

## 2023-08-30 DIAGNOSIS — C61 MALIGNANT NEOPLASM OF PROSTATE: ICD-10-CM

## 2023-09-06 ENCOUNTER — APPOINTMENT (OUTPATIENT)
Dept: HEMATOLOGY ONCOLOGY | Facility: CLINIC | Age: 63
End: 2023-09-06
Payer: COMMERCIAL

## 2023-09-06 ENCOUNTER — RX RENEWAL (OUTPATIENT)
Age: 63
End: 2023-09-06

## 2023-09-06 ENCOUNTER — APPOINTMENT (OUTPATIENT)
Age: 63
End: 2023-09-06

## 2023-09-06 VITALS
TEMPERATURE: 98.2 F | OXYGEN SATURATION: 97 % | SYSTOLIC BLOOD PRESSURE: 177 MMHG | HEIGHT: 73 IN | WEIGHT: 218 LBS | HEART RATE: 72 BPM | DIASTOLIC BLOOD PRESSURE: 99 MMHG | BODY MASS INDEX: 28.89 KG/M2

## 2023-09-06 DIAGNOSIS — C79.9 SECONDARY MALIGNANT NEOPLASM OF UNSPECIFIED SITE: ICD-10-CM

## 2023-09-06 DIAGNOSIS — R23.2 FLUSHING: ICD-10-CM

## 2023-09-06 PROCEDURE — 99215 OFFICE O/P EST HI 40 MIN: CPT

## 2023-09-06 NOTE — ASSESSMENT
[FreeTextEntry1] : 64 y/o male with prostate cancer metastatic to bone.   # Prostate cancer -Continue Eligard, and Zytiga/Prednisone -PSA remains <0.01 ng/mL since 6/2020, stable -Oscal for bone health - Hot flashes- Continue Venlafaxine -will hold Xgeva given possible jaw osteoecrosis   # left jaw osteonecrosis vs osteomyelitis -completed IV abx for 6 weeks via left arm PICC line, jaw healed  -Continue follow up with dentist  -will hold further Xgeva     F/u in 3 months

## 2023-09-06 NOTE — HISTORY OF PRESENT ILLNESS
[T: ___] : T[unfilled] [N: ___] : N[unfilled] [M: ___] : M[unfilled] [AJCC Stage: ____] : AJCC Stage: [unfilled] [de-identified] : Mr. Colt Cedeno is a 60 year old male who presents for a follow up visit for severe advanced prostate cancer with painful bone mets. He was diagnosed with prostate cancer by urologist Dr. Dylon Franklin. He was admitted to the emergency room on 12/6/18 for increasing lower back pain and difficulty ambulating, with significant improvements from fentanyl (patch). He is s/p palliative radiation therapy to spine. Began Lupron and Casodex on recent [facility] admission, and was started on ketoconazole by Urology. He has since transitioned to Lupron/Zytiga/Prednisone hormonal therapy.\par  \par  Bone scan 11/4/2020\par  -Multifocal osseous metastases in the spine, scapulae, b/l ribs and pelvic bones, similar in distribution but significantly decreased in extent and intensity , compared to bone scan of 11/2018\par  - no new bony lesions\par  \par   [de-identified] : Patient doing well, has no acute complaints. Patient tolerating Lupron, Xgeva, Zytiga, and Prednisone with minimal side effects.States energy level and appetite are good. Admits mild low back pain at end of day but resolves next day.  Admits daily hot flashes. States the hot flashes can wake him up out of his sleep. Denies dysuria, hematuria, fever/chills, fatigue,nausea, vomiting, diarrhea,constipation,  abdominal pain, bleeding, easy bruising or visual changes, chest pain,  SOB or LEAL,  LE edema.  10/6/21: Colt is here for follow up. He reports that he continues to have hot flashes despite taking Effexor for the past several months. States the hot flashes can wake him up out of his sleep.  Mild fatigue. Appetite is good. Lost 8 lbs in the past 5 months intentionally. Denies HA. CP, SOB, abd pain, constipation, diarrhea, melena, hematuria, dysuria.   2/9/22: Colt is here for follow up. He reports that he continues to have hot flashes despite taking Effexor for the past several months. States the hot flashes can wake him up out of his sleep.  Mild fatigue. occasional migraine that resolves on its own. Continues to work as . Denies HA. CP, SOB, abd pain, constipation, diarrhea, melena, hematuria, dysuria.   5/11/22: Patient presents on Eligard, Xgeva, Zytiga and Prednisone for prostate cancer metastatic to bone.  + HTN with occasional headaches. + Hot flashes and diaphoresis. + Mild fatigue. + Cough. + Left ear ache. No edema, N/V/D/C, dyspepsia, arthralgia/myalgia, fever, cough or SOB.  8/22/22:Patient presents on Eligard, Xgeva, Zytiga and Prednisone for prostate cancer metastatic to bone.    Pt was hospitalized last week for left lower jaw pain. Found to have osteomyelitis/osteonecrosis. Pt is on IV abx for 6 weeks. Able to eat regular food. Pt reports he had multiple teeth extractions last year. No oral surgery. Concerning for osteonecrosis of jaw given pt is on treatment with Xgeva.  Pt restarted taking his meds. + hot flashes.  9/30/22: Spoke to pt via TTM. Patient presents on Eligard, Xgeva, Zytiga and Prednisone for prostate cancer metastatic to bone. Pt has N/V,  after eating mall food. Getting better. Also had flu shot.  pt's jaw swelling and pain resolved. He is to complete abx course and will remain off Xgeva. + hot flashes. No dizziness  03/29/2023 Returns for follow up visit. Transferring care from Dr. Guy. Continues on Eligard, and Zytiga/Prednisone Reports stopped Xgeva d/t ONJ ~8/2022. Reports recent colonoscopy. Reports intermittent hot flashes, worse on days when he forgets to take Venlafaxine. Feels well overall.  6/9/23: Continues on Eligard, and Zytiga/Prednisone Reports stopped Xgeva d/t ONJ ~8/2022 Patient with hot flashes, somewhat improved on Effexor Patient otherwise doing well  9/6/23;Patient seen  BP elevated today Reports that he ran out of amlodipine, advised f/u with PMD  Patient Reports compliance with Zytiga/ Prednisone, + flashes,

## 2023-09-06 NOTE — REVIEW OF SYSTEMS
[Fatigue] : fatigue [Hot Flashes] : hot flashes [Negative] : Allergic/Immunologic [Diarrhea: Grade 0] : Diarrhea: Grade 0

## 2023-10-11 ENCOUNTER — RX RENEWAL (OUTPATIENT)
Age: 63
End: 2023-10-11

## 2023-10-17 ENCOUNTER — APPOINTMENT (OUTPATIENT)
Dept: INTERNAL MEDICINE | Facility: CLINIC | Age: 63
End: 2023-10-17

## 2023-11-29 ENCOUNTER — OUTPATIENT (OUTPATIENT)
Dept: OUTPATIENT SERVICES | Facility: HOSPITAL | Age: 63
LOS: 1 days | Discharge: HOME | End: 2023-11-29

## 2023-11-29 DIAGNOSIS — C61 MALIGNANT NEOPLASM OF PROSTATE: ICD-10-CM

## 2023-12-06 ENCOUNTER — RESULT REVIEW (OUTPATIENT)
Age: 63
End: 2023-12-06

## 2023-12-06 ENCOUNTER — APPOINTMENT (OUTPATIENT)
Dept: HEMATOLOGY ONCOLOGY | Facility: CLINIC | Age: 63
End: 2023-12-06
Payer: COMMERCIAL

## 2023-12-06 ENCOUNTER — LABORATORY RESULT (OUTPATIENT)
Age: 63
End: 2023-12-06

## 2023-12-06 ENCOUNTER — APPOINTMENT (OUTPATIENT)
Age: 63
End: 2023-12-06

## 2023-12-06 VITALS
BODY MASS INDEX: 29.42 KG/M2 | WEIGHT: 222 LBS | SYSTOLIC BLOOD PRESSURE: 130 MMHG | HEART RATE: 69 BPM | HEIGHT: 73 IN | TEMPERATURE: 95.9 F | OXYGEN SATURATION: 95 % | DIASTOLIC BLOOD PRESSURE: 89 MMHG

## 2023-12-06 LAB
BASOPHILS # BLD AUTO: 0.1 K/UL — SIGNIFICANT CHANGE UP (ref 0–0.2)
BASOPHILS # BLD AUTO: 0.1 K/UL — SIGNIFICANT CHANGE UP (ref 0–0.2)
BASOPHILS NFR BLD AUTO: 1.4 % — SIGNIFICANT CHANGE UP (ref 0–2)
BASOPHILS NFR BLD AUTO: 1.4 % — SIGNIFICANT CHANGE UP (ref 0–2)
EOSINOPHIL # BLD AUTO: 0.1 K/UL — SIGNIFICANT CHANGE UP (ref 0–0.5)
EOSINOPHIL # BLD AUTO: 0.1 K/UL — SIGNIFICANT CHANGE UP (ref 0–0.5)
EOSINOPHIL NFR BLD AUTO: 1.5 % — SIGNIFICANT CHANGE UP (ref 0–6)
EOSINOPHIL NFR BLD AUTO: 1.5 % — SIGNIFICANT CHANGE UP (ref 0–6)
HCT VFR BLD CALC: 42.2 % — SIGNIFICANT CHANGE UP (ref 39–50)
HCT VFR BLD CALC: 42.2 % — SIGNIFICANT CHANGE UP (ref 39–50)
HGB BLD-MCNC: 13 G/DL — SIGNIFICANT CHANGE UP (ref 13–17)
HGB BLD-MCNC: 13 G/DL — SIGNIFICANT CHANGE UP (ref 13–17)
LYMPHOCYTES # BLD AUTO: 1.4 K/UL — SIGNIFICANT CHANGE UP (ref 1–3.3)
LYMPHOCYTES # BLD AUTO: 1.4 K/UL — SIGNIFICANT CHANGE UP (ref 1–3.3)
LYMPHOCYTES # BLD AUTO: 20.6 % — SIGNIFICANT CHANGE UP (ref 13–44)
LYMPHOCYTES # BLD AUTO: 20.6 % — SIGNIFICANT CHANGE UP (ref 13–44)
MCHC RBC-ENTMCNC: 27.3 PG — SIGNIFICANT CHANGE UP (ref 27–34)
MCHC RBC-ENTMCNC: 27.3 PG — SIGNIFICANT CHANGE UP (ref 27–34)
MCHC RBC-ENTMCNC: 30.8 G/DL — LOW (ref 32–36)
MCHC RBC-ENTMCNC: 30.8 G/DL — LOW (ref 32–36)
MCV RBC AUTO: 88.6 FL — SIGNIFICANT CHANGE UP (ref 80–100)
MCV RBC AUTO: 88.6 FL — SIGNIFICANT CHANGE UP (ref 80–100)
MONOCYTES # BLD AUTO: 0.9 K/UL — SIGNIFICANT CHANGE UP (ref 0–0.9)
MONOCYTES # BLD AUTO: 0.9 K/UL — SIGNIFICANT CHANGE UP (ref 0–0.9)
MONOCYTES NFR BLD AUTO: 13.6 % — SIGNIFICANT CHANGE UP (ref 2–14)
MONOCYTES NFR BLD AUTO: 13.6 % — SIGNIFICANT CHANGE UP (ref 2–14)
NEUTROPHILS # BLD AUTO: 4.3 K/UL — SIGNIFICANT CHANGE UP (ref 1.8–7.4)
NEUTROPHILS # BLD AUTO: 4.3 K/UL — SIGNIFICANT CHANGE UP (ref 1.8–7.4)
NEUTROPHILS NFR BLD AUTO: 62.9 % — SIGNIFICANT CHANGE UP (ref 43–77)
NEUTROPHILS NFR BLD AUTO: 62.9 % — SIGNIFICANT CHANGE UP (ref 43–77)
PLATELET # BLD AUTO: 249 K/UL — SIGNIFICANT CHANGE UP (ref 150–400)
PLATELET # BLD AUTO: 249 K/UL — SIGNIFICANT CHANGE UP (ref 150–400)
RBC # BLD: 4.76 M/UL — SIGNIFICANT CHANGE UP (ref 4.2–5.8)
RBC # BLD: 4.76 M/UL — SIGNIFICANT CHANGE UP (ref 4.2–5.8)
RBC # FLD: 13.5 % — SIGNIFICANT CHANGE UP (ref 10.3–14.5)
RBC # FLD: 13.5 % — SIGNIFICANT CHANGE UP (ref 10.3–14.5)
WBC # BLD: 6.8 K/UL — SIGNIFICANT CHANGE UP (ref 3.8–10.5)
WBC # BLD: 6.8 K/UL — SIGNIFICANT CHANGE UP (ref 3.8–10.5)
WBC # FLD AUTO: 6.8 K/UL — SIGNIFICANT CHANGE UP (ref 3.8–10.5)
WBC # FLD AUTO: 6.8 K/UL — SIGNIFICANT CHANGE UP (ref 3.8–10.5)

## 2023-12-06 PROCEDURE — 99214 OFFICE O/P EST MOD 30 MIN: CPT

## 2023-12-08 LAB
ALBUMIN SERPL ELPH-MCNC: 4.2 G/DL
ALP BLD-CCNC: 111 U/L
ALT SERPL-CCNC: 14 U/L
ANION GAP SERPL CALC-SCNC: 11 MMOL/L
AST SERPL-CCNC: 16 U/L
BILIRUB SERPL-MCNC: 0.3 MG/DL
BUN SERPL-MCNC: 17 MG/DL
CALCIUM SERPL-MCNC: 10.1 MG/DL
CHLORIDE SERPL-SCNC: 103 MMOL/L
CO2 SERPL-SCNC: 26 MMOL/L
CREAT SERPL-MCNC: 1.33 MG/DL
EGFR: 60 ML/MIN/1.73M2
GLUCOSE SERPL-MCNC: 89 MG/DL
POTASSIUM SERPL-SCNC: 4.5 MMOL/L
PROT SERPL-MCNC: 6.8 G/DL
PSA SERPL-MCNC: <0.01 NG/ML
SODIUM SERPL-SCNC: 141 MMOL/L

## 2023-12-11 ENCOUNTER — RX RENEWAL (OUTPATIENT)
Age: 63
End: 2023-12-11

## 2023-12-11 RX ORDER — BICALUTAMIDE 50 MG/1
50 TABLET ORAL
Qty: 90 | Refills: 3 | Status: ACTIVE | COMMUNITY
Start: 2018-11-12 | End: 1900-01-01

## 2024-01-05 NOTE — ED ADULT TRIAGE NOTE - CCCP TRG CHIEF CMPLNT
Gynecologic Oncology Progress Note    Author: Dakota Scales, NOEL    Date/Time: 1/5/2024 9:19 AM    Date of Admit: 1/2/2024    HD#: 3     Interval History:  Patient sitting upright in bed, in no acute distress. , Jared, at bedside. She reports feeling well, adding she was able to eat Sinhala toast for breakfast this am without N/V. She denies fever, chills, pain, SOB, CP.     Discussed case with Dr. Barajas, as she is no longer requiring pressor support and infectious workup negative at this time, plan for transfer back to floor today. Informed of CNU preference.       Allergies   Allergen Reactions    Shellfish Allergy Rash and Vomiting     Sick     Iodine Rash and Vomiting     Has a Shellfish allergy.  Is OK with contrast if given pre-meds first.  Has not tried topical iodine/betadine - doesn't want to chance it      Shrimp (Diagnostic) Vomiting and Nausea     Vomiting, feeling sick.     Gramineae Pollens Unspecified and Rash     Runny nose      Penicillins Rash     Decades ago as a child              Current Facility-Administered Medications:     midodrine (Proamatine) tablet 15 mg, 15 mg, Oral, Q8HRS, Marilyn Mota, 15 mg at 01/05/24 0535    cefTRIAXone (Rocephin) syringe 2,000 mg, 2,000 mg, Intravenous, Q24HRS, Yandel Ryan M.D., 2,000 mg at 01/05/24 0535    metroNIDAZOLE (Flagyl) IVPB 500 mg, 500 mg, Intravenous, Q12HRS, Yandel Ryan M.D., Stopped at 01/05/24 0640    thiamine (Vitamin B-1) tablet 100 mg, 100 mg, Oral, DAILY, Rinku Barajas M.D., 100 mg at 01/04/24 1048    prochlorperazine (Compazine) tablet 10 mg, 10 mg, Oral, Q6HRS PRN, Dakota Scales PRYDER, 10 mg at 01/03/24 1548    norepinephrine (Levophed) 8 mg in 250 mL NS infusion (premix), 0-1 mcg/kg/min (Ideal), Intravenous, Continuous, Yandel Ryan M.D., Stopped at 01/04/24 1709    [Held by provider] amLODIPine (Norvasc) tablet 5 mg, 5 mg, Oral, DAILY, BEVERLY ForbesRNuraN., 5 mg at 01/03/24 1733    citalopram (CeleXA) tablet 40 mg,  "40 mg, Oral, DAILY, Maura Osborne, A.P.R.N., 40 mg at 01/03/24 0607    [Held by provider] losartan (Cozaar) tablet 100 mg, 100 mg, Oral, Q EVENING, Maura Osborne, A.P.R.N., 100 mg at 01/02/24 2145    HYDROcodone-acetaminophen (Norco) 5-325 MG per tablet 1.5 Tablet, 1.5 Tablet, Oral, Q8HRS PRN, Maura Osborne, A.P.R.N., 1.5 Tablet at 01/04/24 2001    senna-docusate (Pericolace Or Senokot S) 8.6-50 MG per tablet 2 Tablet, 2 Tablet, Oral, BID, 2 Tablet at 01/03/24 0607 **AND** polyethylene glycol/lytes (Miralax) Packet 1 Packet, 1 Packet, Oral, QDAY PRN **AND** magnesium hydroxide (Milk Of Magnesia) suspension 30 mL, 30 mL, Oral, QDAY PRN **AND** bisacodyl (Dulcolax) suppository 10 mg, 10 mg, Rectal, QDAY PRN, Maura Osborne, A.P.R.N.    lactated ringers infusion, , Intravenous, Continuous, Maura Osborne, A.P.R.N., Last Rate: 100 mL/hr at 01/05/24 0800, Rate Verify at 01/05/24 0800    prochlorperazine (Compazine) injection 10 mg, 10 mg, Intravenous, Q6HRS PRN, Maura Osborne, A.P.R.N., 10 mg at 01/03/24 2257    enoxaparin (Lovenox) inj 40 mg, 40 mg, Subcutaneous, Q EVENING, Maura Osborne, A.P.R.N., 40 mg at 01/04/24 1729    acetaminophen (Tylenol) tablet 650 mg, 650 mg, Oral, Q6HRS PRN, Maura Osborne, A.P.R.N.    morphine 4 MG/ML injection 2 mg, 2 mg, Intravenous, Q2HRS PRN, Maura Osborne, A.P.R.N., 2 mg at 01/04/24 1518    omeprazole (PriLOSEC) capsule 20 mg, 20 mg, Oral, DAILY, SHRAVAN Forbes, 20 mg at 01/05/24 0535       Objective:     BP (!) 147/63   Pulse (!) 48   Temp 36.8 °C (98.2 °F) (Temporal)   Resp (!) 11   Ht 1.702 m (5' 7\")   Wt 113 kg (250 lb)   SpO2 100%     Physical Exam  Vitals and nursing note reviewed.   Constitutional:       General: She is not in acute distress.     Appearance: She is not toxic-appearing.   HENT:      Head: Normocephalic.      Mouth/Throat:      Mouth: Mucous membranes are dry.   Eyes:      General:         Right eye: No discharge.         Left eye: No discharge. " abdominal pain   Cardiovascular:      Rate and Rhythm: Normal rate and regular rhythm.      Pulses: Normal pulses.   Pulmonary:      Effort: Pulmonary effort is normal. No respiratory distress.   Abdominal:      General: There is no distension.      Hernia: A hernia is present.      Comments: Large ventral hernia, soft, non tender   Skin:     General: Skin is warm and dry.   Neurological:      General: No focal deficit present.   Psychiatric:         Mood and Affect: Mood normal.         Behavior: Behavior normal.                 Recent Labs     01/02/24  1310 01/03/24  0437 01/05/24  0410   WBC 7.3 6.5 6.4   RBC 3.68* 3.83* 2.80*   HEMOGLOBIN 10.4* 10.7* 7.9*   HEMATOCRIT 31.8* 34.3* 24.7*   MCV 86.4 89.6 88.2   MCH 28.3 27.9 28.2   MCHC 32.7 31.2* 32.0*   RDW 54.3* 57.2* 56.1*   PLATELETCT 479* 370 315   MPV 9.6 9.5 9.2       Recent Labs     01/02/24  1310 01/03/24  0437 01/05/24  0410   SODIUM 135 133* 138   POTASSIUM 3.7 4.3 3.4*   CHLORIDE 92* 93* 98   CO2 27 23 30   GLUCOSE 115* 151* 94   BUN 36* 34* 32*   CREATININE 1.18 1.01 0.99   CALCIUM 9.2 8.9 8.6       Recent Labs     01/02/24  1310 01/03/24  0437   ASTSGOT 12 10*   ALTSGPT 7 8   TBILIRUBIN 0.6 0.4   ALKPHOSPHAT 134* 126*   GLOBULIN 3.8* 3.5   INR  --  1.15*       Recent Labs     01/03/24  0437   APTT 32.4   INR 1.15*        Radiology:  CT AP (1/2/24):  IMPRESSION:     1.  There is no evidence of small bowel obstruction. Small bowel loops are again noted to be grouped in the right mid abdomen with possible areas of serosal metastasis.  2.  Very slight decrease in the amount of moderate loculated ascites with peritoneal nodularity and enhancement consistent with peritoneal carcinomatosis.  3.  No significant change in the moderate to large sized hiatal hernia with fluid distention of the distal esophagus. Question of mild gastric wall thickening which could represent inflammation.  4.  Stable trace of dependent left pleural effusion and left lower lobe atelectasis.      Assessment and Plan:    Assessment and plan:   Ms Barron is a very pleasant 80 yo with a history of endometrial cancer. She was 4y 11 months since her last treatment when she began developing recurrent ascites of unknown etiology, and thus was planned to undergo diagnostic laparoscopy on 1/4/24 with Dr Dalal. She subsequently developed intractable nausea with emesis and found to have signs of dehydration, and thus was admitted prior to this procedure.      Intractable Nausea with emesis:  Resolved following antiemetics in the ED. Continue antiemetics PRN. Will hold zofran at this time due to prolonged QTc.   Shock: Became hypotensive 1/3 evening; remained asymptomatic. Unclear etiology. Did not respond to fluid bolus or midodrine. s/p CVC placement, for pressor support, now off pressors as of 1/4 @1709. Continue midodrine per ICU team. Infectious w/u per ICU team negative at this time. Empiric flagyl and rocephin per ICU team. Will transfer back to floor today. Appreciate ICU team recs.   Dehydration: likely due to poor PO intake and emesis.  S/p fluid resuscitation in ICU. Continue IVF, Monitor lytes and replace PRN.   Endometrial Cancer: follows with Dr Dalal, 4y 11m since last treatment. Recent CT  scans with recurrent ascites of unknown etiology. Was scheduled for diagnostic laparoscopy with Dr Dalal today, however, will hold off on surgery given hemodynamic instability requiring pressors. Reschedule surgery when clinically appropriate. Patient my have a diet.   Poor venous access: ICU team unable to place IJ CVC after multiple attempts. Patient requires right femoral CVC. Consider Mediport in the future.   Hx of HTN: hold home meds  Hx of HLD: continue home meds.      Case and plan discussed with Dr. Dalal

## 2024-02-06 ENCOUNTER — APPOINTMENT (OUTPATIENT)
Dept: INTERNAL MEDICINE | Facility: CLINIC | Age: 64
End: 2024-02-06

## 2024-02-23 RX ORDER — VENLAFAXINE HYDROCHLORIDE 37.5 MG/1
37.5 CAPSULE, EXTENDED RELEASE ORAL
Qty: 30 | Refills: 3 | Status: ACTIVE | COMMUNITY
Start: 2021-04-09 | End: 1900-01-01

## 2024-02-28 ENCOUNTER — OUTPATIENT (OUTPATIENT)
Dept: OUTPATIENT SERVICES | Facility: HOSPITAL | Age: 64
LOS: 1 days | Discharge: HOME | End: 2024-02-28

## 2024-02-28 DIAGNOSIS — C79.51 SECONDARY MALIGNANT NEOPLASM OF BONE: ICD-10-CM

## 2024-02-28 DIAGNOSIS — C61 MALIGNANT NEOPLASM OF PROSTATE: ICD-10-CM

## 2024-03-06 ENCOUNTER — RESULT REVIEW (OUTPATIENT)
Age: 64
End: 2024-03-06

## 2024-03-06 ENCOUNTER — APPOINTMENT (OUTPATIENT)
Age: 64
End: 2024-03-06

## 2024-03-06 LAB
BASOPHILS # BLD AUTO: 0.1 K/UL — SIGNIFICANT CHANGE UP (ref 0–0.2)
BASOPHILS NFR BLD AUTO: 1.1 % — SIGNIFICANT CHANGE UP (ref 0–2)
EOSINOPHIL # BLD AUTO: 0.1 K/UL — SIGNIFICANT CHANGE UP (ref 0–0.5)
EOSINOPHIL NFR BLD AUTO: 1.3 % — SIGNIFICANT CHANGE UP (ref 0–6)
HCT VFR BLD CALC: 43.5 % — SIGNIFICANT CHANGE UP (ref 39–50)
HGB BLD-MCNC: 14 G/DL — SIGNIFICANT CHANGE UP (ref 13–17)
LYMPHOCYTES # BLD AUTO: 1.8 K/UL — SIGNIFICANT CHANGE UP (ref 1–3.3)
LYMPHOCYTES # BLD AUTO: 23.4 % — SIGNIFICANT CHANGE UP (ref 13–44)
MCHC RBC-ENTMCNC: 27.4 PG — SIGNIFICANT CHANGE UP (ref 27–34)
MCHC RBC-ENTMCNC: 32.2 G/DL — SIGNIFICANT CHANGE UP (ref 32–36)
MCV RBC AUTO: 85.2 FL — SIGNIFICANT CHANGE UP (ref 80–100)
MONOCYTES # BLD AUTO: 0.8 K/UL — SIGNIFICANT CHANGE UP (ref 0–0.9)
MONOCYTES NFR BLD AUTO: 11 % — SIGNIFICANT CHANGE UP (ref 2–14)
NEUTROPHILS # BLD AUTO: 4.8 K/UL — SIGNIFICANT CHANGE UP (ref 1.8–7.4)
NEUTROPHILS NFR BLD AUTO: 63.3 % — SIGNIFICANT CHANGE UP (ref 43–77)
PLATELET # BLD AUTO: 239 K/UL — SIGNIFICANT CHANGE UP (ref 150–400)
RBC # BLD: 5.11 M/UL — SIGNIFICANT CHANGE UP (ref 4.2–5.8)
RBC # FLD: 13.7 % — SIGNIFICANT CHANGE UP (ref 10.3–14.5)
WBC # BLD: 7.6 K/UL — SIGNIFICANT CHANGE UP (ref 3.8–10.5)
WBC # FLD AUTO: 7.6 K/UL — SIGNIFICANT CHANGE UP (ref 3.8–10.5)

## 2024-03-08 LAB
ALBUMIN SERPL ELPH-MCNC: 4.3 G/DL
ALP BLD-CCNC: 109 U/L
ALT SERPL-CCNC: 13 U/L
ANION GAP SERPL CALC-SCNC: 15 MMOL/L
AST SERPL-CCNC: 15 U/L
BILIRUB SERPL-MCNC: 0.2 MG/DL
BUN SERPL-MCNC: 16 MG/DL
CALCIUM SERPL-MCNC: 10.2 MG/DL
CHLORIDE SERPL-SCNC: 101 MMOL/L
CO2 SERPL-SCNC: 24 MMOL/L
CREAT SERPL-MCNC: 1.45 MG/DL
EGFR: 53 ML/MIN/1.73M2
GLUCOSE SERPL-MCNC: 82 MG/DL
POTASSIUM SERPL-SCNC: 4.7 MMOL/L
PROT SERPL-MCNC: 6.8 G/DL
SODIUM SERPL-SCNC: 140 MMOL/L

## 2024-03-11 LAB
PSA SERPL-MCNC: <0.01 NG/ML
TESTOST FREE SERPL-MCNC: <0 PG/ML
TESTOST SERPL-MCNC: <2.5 NG/DL

## 2024-03-12 ENCOUNTER — APPOINTMENT (OUTPATIENT)
Dept: HEMATOLOGY ONCOLOGY | Facility: CLINIC | Age: 64
End: 2024-03-12
Payer: COMMERCIAL

## 2024-03-13 ENCOUNTER — APPOINTMENT (OUTPATIENT)
Dept: HEMATOLOGY ONCOLOGY | Facility: CLINIC | Age: 64
End: 2024-03-13
Payer: COMMERCIAL

## 2024-03-13 VITALS
DIASTOLIC BLOOD PRESSURE: 95 MMHG | SYSTOLIC BLOOD PRESSURE: 130 MMHG | OXYGEN SATURATION: 96 % | HEIGHT: 73 IN | HEART RATE: 85 BPM

## 2024-03-13 DIAGNOSIS — C61 MALIGNANT NEOPLASM OF PROSTATE: ICD-10-CM

## 2024-03-13 DIAGNOSIS — C79.51 SECONDARY MALIGNANT NEOPLASM OF BONE: ICD-10-CM

## 2024-03-13 PROCEDURE — 99214 OFFICE O/P EST MOD 30 MIN: CPT

## 2024-03-13 NOTE — HISTORY OF PRESENT ILLNESS
[T: ___] : T[unfilled] [N: ___] : N[unfilled] [AJCC Stage: ____] : AJCC Stage: [unfilled] [M: ___] : M[unfilled] [de-identified] : Mr. Colt Cedeno is a 64 year old male who presents for a follow up visit for severe advanced prostate cancer with painful bone mets. He was diagnosed with prostate cancer by urologist Dr. Dylon Franklin. He was admitted to the emergency room on 12/6/18 for increasing lower back pain and difficulty ambulating, with significant improvements from fentanyl (patch). He is s/p palliative radiation therapy to spine. Began Lupron and Casodex on recent [facility] admission, and was started on ketoconazole by Urology. He has since transitioned to Lupron/Zytiga/Prednisone hormonal therapy.  Bone scan 11/4/2020 -Multifocal osseous metastases in the spine, scapulae, b/l ribs and pelvic bones, similar in distribution but significantly decreased in extent and intensity , compared to bone scan of 11/2018 - no new bony lesions   [de-identified] : Patient doing well, has no acute complaints. Patient tolerating Lupron, Xgeva, Zytiga, and Prednisone with minimal side effects.States energy level and appetite are good. Admits mild low back pain at end of day but resolves next day.  Admits daily hot flashes. States the hot flashes can wake him up out of his sleep. Denies dysuria, hematuria, fever/chills, fatigue,nausea, vomiting, diarrhea,constipation,  abdominal pain, bleeding, easy bruising or visual changes, chest pain,  SOB or LEAL,  LE edema.  10/6/21: Colt is here for follow up. He reports that he continues to have hot flashes despite taking Effexor for the past several months. States the hot flashes can wake him up out of his sleep.  Mild fatigue. Appetite is good. Lost 8 lbs in the past 5 months intentionally. Denies HA. CP, SOB, abd pain, constipation, diarrhea, melena, hematuria, dysuria.   2/9/22: Colt is here for follow up. He reports that he continues to have hot flashes despite taking Effexor for the past several months. States the hot flashes can wake him up out of his sleep.  Mild fatigue. occasional migraine that resolves on its own. Continues to work as . Denies HA. CP, SOB, abd pain, constipation, diarrhea, melena, hematuria, dysuria.   5/11/22: Patient presents on Eligard, Xgeva, Zytiga and Prednisone for prostate cancer metastatic to bone.  + HTN with occasional headaches. + Hot flashes and diaphoresis. + Mild fatigue. + Cough. + Left ear ache. No edema, N/V/D/C, dyspepsia, arthralgia/myalgia, fever, cough or SOB.  8/22/22:Patient presents on Eligard, Xgeva, Zytiga and Prednisone for prostate cancer metastatic to bone.    Pt was hospitalized last week for left lower jaw pain. Found to have osteomyelitis/osteonecrosis. Pt is on IV abx for 6 weeks. Able to eat regular food. Pt reports he had multiple teeth extractions last year. No oral surgery. Concerning for osteonecrosis of jaw given pt is on treatment with Xgeva.  Pt restarted taking his meds. + hot flashes.  9/30/22: Spoke to pt via TTM. Patient presents on Eligard, Xgeva, Zytiga and Prednisone for prostate cancer metastatic to bone. Pt has N/V,  after eating mall food. Getting better. Also had flu shot.  pt's jaw swelling and pain resolved. He is to complete abx course and will remain off Xgeva. + hot flashes. No dizziness  03/29/2023 Returns for follow up visit. Transferring care from Dr. Guy. Continues on Eligard, and Zytiga/Prednisone Reports stopped Xgeva d/t ONJ ~8/2022. Reports recent colonoscopy. Reports intermittent hot flashes, worse on days when he forgets to take Venlafaxine. Feels well overall.  6/9/23: Continues on Eligard, and Zytiga/Prednisone Reports stopped Xgeva d/t ONJ ~8/2022 Patient with hot flashes, somewhat improved on Effexor Patient otherwise doing well  9/6/23;Patient seen  BP elevated today Reports that he ran out of amlodipine, advised f/u with PMD  Patient Reports compliance with Zytiga/ Prednisone, + flashes,   12/6/23: PAtient presents for follow up  Reports hot flashes, stable Patient admits to taking Zytiga/Prednisone every other day for the past month Patient does not like how he feels on current treatment - foggy, ornelas, occasional vomiting, weakness Patient contemplating if he wishes to continue current treatment  3/13/24: Patient presents for follow up. Continues on Lupron. Most recent injection on 3/6/24.  Reports mild fatigue early in the morning and hot flashes.  He states he can feel his testosterone is suppressed.  Other than fatigue, patient has no complaints. He is feeling well.  PSA <0.01 ng/mL (3/6/24)

## 2024-03-13 NOTE — REASON FOR VISIT
Brief Care Management Note    Length of Stay (days): 0    Expected Discharge Date: 10/5     Additional Information:  Patient discharged from ED. Per discussion with ED SW, Urology recommended nocturnal home O2. Home O2 cannot be arranged without qualifying dx and O2 saturation study (home O2 walk test or overnight pulse ox study). ROHIT De Leon w/urology updated at this time.     Alayna Whittington, RNCC, BSN    [facility]  [address] [place] [id number]    [email or web address]    Office: [phone] Pager: [phone]  To contact the weekend RNCC, page [phone].          [Follow-Up Visit] : a follow-up [FreeTextEntry2] : metastatic prostate cancer

## 2024-03-13 NOTE — REVIEW OF SYSTEMS
[Fatigue] : fatigue [Diarrhea: Grade 0] : Diarrhea: Grade 0 [Hot Flashes] : hot flashes [Negative] : Allergic/Immunologic

## 2024-03-13 NOTE — ASSESSMENT
[FreeTextEntry1] : 64 y/o male with prostate cancer metastatic to bone.  # Prostate cancer -Continue Eligard, and Zytiga/Prednisone -PSA remains <0.01 ng/mL since 6/2020, stable -Oscal for bone health - Hot flashes- Continue Venlafaxine -will hold Xgeva given possible jaw osteoecrosis - Discussed at length the importance of compliance of current treatment regimen especially given great disease control. Urged to stay on treatment, Will discuss with Dr. Aponte concerns/symptoms regarding current regimen - Repeat PSA/CMP/Testosterone - Continue with Lupron every 3 months.    # left jaw osteonecrosis vs osteomyelitis -completed IV abx for 6 weeks via left arm PICC line, jaw healed -Continue follow up with dentist -will hold further Xgeva    F/u in 6 months with Dr. Aponte

## 2024-03-13 NOTE — ADDENDUM
[FreeTextEntry1] : Documented by Nikita Sylvester acting as scribe for Dr. Canseco on  03/13/2024.   All Medical record entries made by the Scribe were at my, Dr. Canseco's, direction and personally dictated by me on  03/13/2024. I have reviewed the chart and agree that the record accurately reflects my personal performance of the history, physical exam, assessment and plan. I have also personally directed, reviewed, and agreed with the discharge instructions.

## 2024-05-02 NOTE — CHART NOTE - NSCHARTNOTEFT_GEN_A_CORE
Spoke to Pt about transferring to [facility]  Pt had just spoken to Dr. Flores, who presented him with a different treatment option. Pt would like to stay at this hospital for treatment and refusing transfer to [facility]  Dr. Yeh, [facility], aware and has spoken to Dr. Flores also. 4 = No assist / stand by assistance

## 2024-05-14 ENCOUNTER — RX RENEWAL (OUTPATIENT)
Age: 64
End: 2024-05-14

## 2024-05-14 RX ORDER — PREDNISONE 10 MG/1
10 TABLET ORAL
Qty: 30 | Refills: 4 | Status: ACTIVE | COMMUNITY
Start: 2018-12-28 | End: 1900-01-01

## 2024-05-29 ENCOUNTER — OUTPATIENT (OUTPATIENT)
Dept: OUTPATIENT SERVICES | Facility: HOSPITAL | Age: 64
LOS: 1 days | Discharge: HOME | End: 2024-05-29

## 2024-05-29 DIAGNOSIS — C61 MALIGNANT NEOPLASM OF PROSTATE: ICD-10-CM

## 2024-05-29 DIAGNOSIS — C79.51 SECONDARY MALIGNANT NEOPLASM OF BONE: ICD-10-CM

## 2024-06-10 ENCOUNTER — APPOINTMENT (OUTPATIENT)
Age: 64
End: 2024-06-10

## 2024-06-26 ENCOUNTER — RX RENEWAL (OUTPATIENT)
Age: 64
End: 2024-06-26

## 2024-06-26 RX ORDER — ABIRATERONE ACETATE 250 MG/1
250 TABLET, FILM COATED ORAL
Qty: 120 | Refills: 1 | Status: ACTIVE | COMMUNITY
Start: 2019-06-04 | End: 1900-01-01

## 2024-08-13 NOTE — ED ADULT NURSE NOTE - CAS TRG GENERAL NORM CIRC DET
Today your child was diagnosed with croup. The cough will take a few days to improve. For any coughing fits you can bring your child into a steamy bathroom or stick your child's head in an open cold freezer. During the winter/ cold months you can bring your child outside as the cold air may help with the coughing fits. Place a cold vaporizer next to your child's bed. Please come back to the emergency department  or see your pediatriian for worsening symptoms, develops a fever of 101.5 lasting longer than 3 days, has significantly less urine output, turns blue, has stridor \"high pitched noise\" when breathing in while resting, has pulling of muscles around the neck/belly/rib cage, or any other concerns.      Strong peripheral pulses/Capillary refill less/equal to 2 seconds

## 2024-08-21 ENCOUNTER — RX RENEWAL (OUTPATIENT)
Age: 64
End: 2024-08-21

## 2024-08-23 ENCOUNTER — RX RENEWAL (OUTPATIENT)
Age: 64
End: 2024-08-23

## 2024-08-23 ENCOUNTER — APPOINTMENT (OUTPATIENT)
Dept: INTERNAL MEDICINE | Facility: CLINIC | Age: 64
End: 2024-08-23
Payer: COMMERCIAL

## 2024-08-23 VITALS
HEIGHT: 73 IN | HEART RATE: 76 BPM | BODY MASS INDEX: 29.16 KG/M2 | SYSTOLIC BLOOD PRESSURE: 184 MMHG | WEIGHT: 220 LBS | OXYGEN SATURATION: 95 % | DIASTOLIC BLOOD PRESSURE: 120 MMHG

## 2024-08-23 DIAGNOSIS — I16.1 HYPERTENSIVE EMERGENCY: ICD-10-CM

## 2024-08-23 DIAGNOSIS — H53.8 OTHER VISUAL DISTURBANCES: ICD-10-CM

## 2024-08-23 DIAGNOSIS — C79.51 SECONDARY MALIGNANT NEOPLASM OF BONE: ICD-10-CM

## 2024-08-23 DIAGNOSIS — C61 MALIGNANT NEOPLASM OF PROSTATE: ICD-10-CM

## 2024-08-23 PROCEDURE — 99214 OFFICE O/P EST MOD 30 MIN: CPT

## 2024-08-23 PROCEDURE — G2211 COMPLEX E/M VISIT ADD ON: CPT

## 2024-08-23 NOTE — ASSESSMENT
[FreeTextEntry1] : Hypertensive emergency Headache -Pt has symptoms of blurred vision, severe headache, and diplopia -BP today elevated 184/120 -Needs to go to the emergency room for evaluation -Should get CT head to r/o mass effect given hx of metastatic prostate cancer

## 2024-08-23 NOTE — END OF VISIT
[] : Resident [FreeTextEntry3] : Er evaluation warranted bring bp down, obtain ct of head to evaluated blurred vision and rule out mets to brain

## 2024-08-23 NOTE — HISTORY OF PRESENT ILLNESS
[FreeTextEntry1] : headache [de-identified] : 65 y/o M w/ hx of metastatic prostate cancer who presents today for left-sided headaches. Pt reports these headaches are occurring more frequently over the past two months. The headaches are left-sided, behind the eye and ear. Not asociated w/ photophobia or phonophobia. No prodromal feelings. No eye tearing. Pt also reports left-sided visual disturbances, described as diplopia and mildly blurred vision w/ decreased peripheral vision and associated eye irritation. Wife present at bedside for additional hx. Denies confusion, abnormal behavior, episodes of unresponsiveness, lethargy, and obtundation.

## 2024-08-30 ENCOUNTER — APPOINTMENT (OUTPATIENT)
Dept: INTERNAL MEDICINE | Facility: CLINIC | Age: 64
End: 2024-08-30

## 2024-09-03 RX ORDER — TRAMADOL HYDROCHLORIDE 50 MG/1
50 TABLET, COATED ORAL
Qty: 60 | Refills: 0 | Status: ACTIVE | COMMUNITY
Start: 2024-09-03 | End: 1900-01-01

## 2024-09-04 ENCOUNTER — APPOINTMENT (OUTPATIENT)
Age: 64
End: 2024-09-04

## 2024-09-06 ENCOUNTER — APPOINTMENT (OUTPATIENT)
Dept: INTERNAL MEDICINE | Facility: CLINIC | Age: 64
End: 2024-09-06

## 2024-09-09 ENCOUNTER — APPOINTMENT (OUTPATIENT)
Dept: INTERNAL MEDICINE | Facility: CLINIC | Age: 64
End: 2024-09-09

## 2024-09-09 VITALS
BODY MASS INDEX: 29.16 KG/M2 | DIASTOLIC BLOOD PRESSURE: 86 MMHG | HEIGHT: 73 IN | WEIGHT: 220 LBS | SYSTOLIC BLOOD PRESSURE: 136 MMHG | HEART RATE: 72 BPM | RESPIRATION RATE: 15 BRPM

## 2024-09-09 DIAGNOSIS — Z23 ENCOUNTER FOR IMMUNIZATION: ICD-10-CM

## 2024-09-09 DIAGNOSIS — I10 ESSENTIAL (PRIMARY) HYPERTENSION: ICD-10-CM

## 2024-09-09 DIAGNOSIS — H53.8 OTHER VISUAL DISTURBANCES: ICD-10-CM

## 2024-09-09 PROCEDURE — G2211 COMPLEX E/M VISIT ADD ON: CPT | Mod: NC

## 2024-09-09 PROCEDURE — 90662 IIV NO PRSV INCREASED AG IM: CPT

## 2024-09-09 PROCEDURE — G0008: CPT

## 2024-09-09 PROCEDURE — 99214 OFFICE O/P EST MOD 30 MIN: CPT | Mod: 25

## 2024-09-09 NOTE — ASSESSMENT
[FreeTextEntry1] : bp much better take the meds daily increase amlodipine to 5mg prostate cancer metastatic recieving tx flu vaccine given follow up 3months reviewed ER labs were normal

## 2024-09-09 NOTE — HEALTH RISK ASSESSMENT
[No] : No [No falls in past year] : Patient reported no falls in the past year [Little interest or pleasure doing things] : 1) Little interest or pleasure doing things [Feeling down, depressed, or hopeless] : 2) Feeling down, depressed, or hopeless [0] : 2) Feeling down, depressed, or hopeless: Not at all (0) [PHQ-2 Negative - No further assessment needed] : PHQ-2 Negative - No further assessment needed [[id number]] : 0 [Never] : Never

## 2024-09-09 NOTE — HISTORY OF PRESENT ILLNESS
[FreeTextEntry1] : follwo up bp headache [de-identified] : follow up bp and headaches was in ER ct negatave bp was elevated now back on meds no further headaches takes meds no chest pain sob nvd  agrees to flu vaccine

## 2024-09-13 ENCOUNTER — APPOINTMENT (OUTPATIENT)
Dept: INTERNAL MEDICINE | Facility: CLINIC | Age: 64
End: 2024-09-13

## 2024-09-17 ENCOUNTER — RESULT REVIEW (OUTPATIENT)
Age: 64
End: 2024-09-17

## 2024-09-17 ENCOUNTER — APPOINTMENT (OUTPATIENT)
Dept: HEMATOLOGY ONCOLOGY | Facility: CLINIC | Age: 64
End: 2024-09-17
Payer: COMMERCIAL

## 2024-09-17 VITALS
HEART RATE: 85 BPM | SYSTOLIC BLOOD PRESSURE: 148 MMHG | OXYGEN SATURATION: 94 % | HEIGHT: 73 IN | BODY MASS INDEX: 28.94 KG/M2 | TEMPERATURE: 95.5 F | DIASTOLIC BLOOD PRESSURE: 80 MMHG | WEIGHT: 218.38 LBS

## 2024-09-17 DIAGNOSIS — M87.9 OSTEONECROSIS, UNSPECIFIED: ICD-10-CM

## 2024-09-17 DIAGNOSIS — C61 MALIGNANT NEOPLASM OF PROSTATE: ICD-10-CM

## 2024-09-17 DIAGNOSIS — C79.51 SECONDARY MALIGNANT NEOPLASM OF BONE: ICD-10-CM

## 2024-09-17 PROCEDURE — 99215 OFFICE O/P EST HI 40 MIN: CPT

## 2024-09-17 NOTE — ADDENDUM
[FreeTextEntry1] : Documented by Nikita Sylvester acting as scribe for Dr. Canseco on  03/13/2024.   All Medical record entries made by the Scribe were at my, Dr. Canseco's, direction and personally dictated by me on  03/13/2024. I have reviewed the chart and agree that the record accurately reflects my personal performance of the history, physical exam, assessment and plan. I have also personally directed, reviewed, and agreed with the discharge instructions. 
no

## 2024-09-17 NOTE — ASSESSMENT
[FreeTextEntry1] : 62 y/o male with prostate cancer metastatic to bone.  # Prostate cancer -Continue Eligard, and Zytiga/Prednisone -PSA remains <0.01 ng/mL since 6/2020, stable -Oscal for bone health - Hot flashes- Continue Venlafaxine -will hold Xgeva given possible jaw osteoecrosis - Repeat PSA/CMP/Testosterone -  Patient here for follow up Last Lupron 9/4/24 , next due on 12/4/24 Reports compliance  Last PSA was < 0.01 - Continue with Lupron every 3 months.    # left jaw osteonecrosis vs osteomyelitis -completed IV abx for 6 weeks via left arm PICC line, jaw healed -Continue follow up with dentist -will hold further Xgeva    F/u in 6 months with darryn

## 2024-09-17 NOTE — HISTORY OF PRESENT ILLNESS
[T: ___] : T[unfilled] [N: ___] : N[unfilled] [M: ___] : M[unfilled] [AJCC Stage: ____] : AJCC Stage: [unfilled] [de-identified] : Mr. Colt Cedeno is a 64 year old male who presents for a follow up visit for severe advanced prostate cancer with painful bone mets. He was diagnosed with prostate cancer by urologist Dr. Dylon Franklin. He was admitted to the emergency room on 12/6/18 for increasing lower back pain and difficulty ambulating, with significant improvements from fentanyl (patch). He is s/p palliative radiation therapy to spine. Began Lupron and Casodex on recent [facility] admission, and was started on ketoconazole by Urology. He has since transitioned to Lupron/Zytiga/Prednisone hormonal therapy.  Bone scan 11/4/2020 -Multifocal osseous metastases in the spine, scapulae, b/l ribs and pelvic bones, similar in distribution but significantly decreased in extent and intensity , compared to bone scan of 11/2018 - no new bony lesions   [de-identified] : Patient doing well, has no acute complaints. Patient tolerating Lupron, Xgeva, Zytiga, and Prednisone with minimal side effects.States energy level and appetite are good. Admits mild low back pain at end of day but resolves next day.  Admits daily hot flashes. States the hot flashes can wake him up out of his sleep. Denies dysuria, hematuria, fever/chills, fatigue,nausea, vomiting, diarrhea,constipation,  abdominal pain, bleeding, easy bruising or visual changes, chest pain,  SOB or LEAL,  LE edema.  10/6/21: Colt is here for follow up. He reports that he continues to have hot flashes despite taking Effexor for the past several months. States the hot flashes can wake him up out of his sleep.  Mild fatigue. Appetite is good. Lost 8 lbs in the past 5 months intentionally. Denies HA. CP, SOB, abd pain, constipation, diarrhea, melena, hematuria, dysuria.   2/9/22: Colt is here for follow up. He reports that he continues to have hot flashes despite taking Effexor for the past several months. States the hot flashes can wake him up out of his sleep.  Mild fatigue. occasional migraine that resolves on its own. Continues to work as . Denies HA. CP, SOB, abd pain, constipation, diarrhea, melena, hematuria, dysuria.   5/11/22: Patient presents on Eligard, Xgeva, Zytiga and Prednisone for prostate cancer metastatic to bone.  + HTN with occasional headaches. + Hot flashes and diaphoresis. + Mild fatigue. + Cough. + Left ear ache. No edema, N/V/D/C, dyspepsia, arthralgia/myalgia, fever, cough or SOB.  8/22/22:Patient presents on Eligard, Xgeva, Zytiga and Prednisone for prostate cancer metastatic to bone.    Pt was hospitalized last week for left lower jaw pain. Found to have osteomyelitis/osteonecrosis. Pt is on IV abx for 6 weeks. Able to eat regular food. Pt reports he had multiple teeth extractions last year. No oral surgery. Concerning for osteonecrosis of jaw given pt is on treatment with Xgeva.  Pt restarted taking his meds. + hot flashes.  9/30/22: Spoke to pt via TTM. Patient presents on Eligard, Xgeva, Zytiga and Prednisone for prostate cancer metastatic to bone. Pt has N/V,  after eating mall food. Getting better. Also had flu shot.  pt's jaw swelling and pain resolved. He is to complete abx course and will remain off Xgeva. + hot flashes. No dizziness  03/29/2023 Returns for follow up visit. Transferring care from Dr. Guy. Continues on Eligard, and Zytiga/Prednisone Reports stopped Xgeva d/t ONJ ~8/2022. Reports recent colonoscopy. Reports intermittent hot flashes, worse on days when he forgets to take Venlafaxine. Feels well overall.  6/9/23: Continues on Eligard, and Zytiga/Prednisone Reports stopped Xgeva d/t ONJ ~8/2022 Patient with hot flashes, somewhat improved on Effexor Patient otherwise doing well  9/6/23;Patient seen  BP elevated today Reports that he ran out of amlodipine, advised f/u with PMD  Patient Reports compliance with Zytiga/ Prednisone, + flashes,   12/6/23: PAtient presents for follow up  Reports hot flashes, stable Patient admits to taking Zytiga/Prednisone every other day for the past month Patient does not like how he feels on current treatment - foggy, ornelas, occasional vomiting, weakness Patient contemplating if he wishes to continue current treatment  3/13/24: Patient presents for follow up. Continues on Lupron. Most recent injection on 3/6/24.  Reports mild fatigue early in the morning and hot flashes.  He states he can feel his testosterone is suppressed.  Other than fatigue, patient has no complaints. He is feeling well.  PSA <0.01 ng/mL (3/6/24)    9/17/24: Patient here for follow up Last Lupron 9/4/24  Still taking Abiratarone/ Prednisone / Bicalutamide, Reports compliance  Last PSA was < 0.01 + headache

## 2024-09-18 LAB
ALBUMIN SERPL ELPH-MCNC: 4.3 G/DL
ALP BLD-CCNC: 131 U/L
ALT SERPL-CCNC: 13 U/L
ANION GAP SERPL CALC-SCNC: 13 MMOL/L
AST SERPL-CCNC: 15 U/L
BILIRUB SERPL-MCNC: 0.2 MG/DL
BUN SERPL-MCNC: 17 MG/DL
CALCIUM SERPL-MCNC: 9.8 MG/DL
CHLORIDE SERPL-SCNC: 103 MMOL/L
CO2 SERPL-SCNC: 26 MMOL/L
CREAT SERPL-MCNC: 1.4 MG/DL
EGFR: 56 ML/MIN/1.73M2
GLUCOSE SERPL-MCNC: 110 MG/DL
POTASSIUM SERPL-SCNC: 4.2 MMOL/L
PROT SERPL-MCNC: 7.3 G/DL
PSA SERPL-MCNC: <0.01 NG/ML
SODIUM SERPL-SCNC: 142 MMOL/L

## 2024-09-25 ENCOUNTER — OFFICE (OUTPATIENT)
Dept: URBAN - METROPOLITAN AREA CLINIC 100 | Facility: CLINIC | Age: 64
Setting detail: OPHTHALMOLOGY
End: 2024-09-25

## 2024-09-25 ENCOUNTER — APPOINTMENT (OUTPATIENT)
Dept: INTERNAL MEDICINE | Facility: CLINIC | Age: 64
End: 2024-09-25
Payer: COMMERCIAL

## 2024-09-25 VITALS — DIASTOLIC BLOOD PRESSURE: 94 MMHG | RESPIRATION RATE: 16 BRPM | HEART RATE: 82 BPM | SYSTOLIC BLOOD PRESSURE: 148 MMHG

## 2024-09-25 VITALS — BODY MASS INDEX: 29.03 KG/M2 | WEIGHT: 219 LBS | HEIGHT: 73 IN
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
## 2024-09-25 DIAGNOSIS — I63.59 CEREBRAL INFARCTION DUE TO UNSPECIFIED OCCLUSION OR STENOSIS OF OTHER CEREBRAL ARTERY: ICD-10-CM

## 2024-09-25 DIAGNOSIS — H49.02 THIRD [OCULOMOTOR] NERVE PALSY, LEFT EYE: ICD-10-CM

## 2024-09-25 PROCEDURE — G2211 COMPLEX E/M VISIT ADD ON: CPT | Mod: NC

## 2024-09-25 PROCEDURE — NO SHOW FE NO SHOW FEE: Performed by: OPHTHALMOLOGY

## 2024-09-25 PROCEDURE — 99214 OFFICE O/P EST MOD 30 MIN: CPT

## 2024-09-25 NOTE — ASSESSMENT
[FreeTextEntry1] : left eye palsy last 2weeks see optho urgently today need MRI with and without contrast of the breain rule out brain stem infarct bp increase amlodipine to 10mg follow up 2 weeks labs form ER no sign of dm

## 2024-09-25 NOTE — PHYSICAL EXAM
[No Acute Distress] : no acute distress [Well Nourished] : well nourished [Well Developed] : well developed [Well-Appearing] : well-appearing [Normal Sclera/Conjunctiva] : normal sclera/conjunctiva [PERRL] : pupils equal round and reactive to light [EOMI] : extraocular movements intact [Normal Outer Ear/Nose] : the outer ears and nose were normal in appearance [Normal Oropharynx] : the oropharynx was normal [No JVD] : no jugular venous distention [No Lymphadenopathy] : no lymphadenopathy [Supple] : supple [Thyroid Normal, No Nodules] : the thyroid was normal and there were no nodules present [No Respiratory Distress] : no respiratory distress  [No Accessory Muscle Use] : no accessory muscle use [Clear to Auscultation] : lungs were clear to auscultation bilaterally [Normal Rate] : normal rate  [Regular Rhythm] : with a regular rhythm [Normal S1, S2] : normal S1 and S2 [No Murmur] : no murmur heard [No Carotid Bruits] : no carotid bruits [No Abdominal Bruit] : a ~M bruit was not heard ~T in the abdomen [No Varicosities] : no varicosities [Pedal Pulses Present] : the pedal pulses are present [No Edema] : there was no peripheral edema [No Palpable Aorta] : no palpable aorta [No Extremity Clubbing/Cyanosis] : no extremity clubbing/cyanosis [Soft] : abdomen soft [Non Tender] : non-tender [Non-distended] : non-distended [No Masses] : no abdominal mass palpated [No HSM] : no HSM [Normal Bowel Sounds] : normal bowel sounds [Normal Posterior Cervical Nodes] : no posterior cervical lymphadenopathy [Normal Anterior Cervical Nodes] : no anterior cervical lymphadenopathy [No CVA Tenderness] : no CVA  tenderness [No Spinal Tenderness] : no spinal tenderness [No Joint Swelling] : no joint swelling [Grossly Normal Strength/Tone] : grossly normal strength/tone [No Rash] : no rash [Coordination Grossly Intact] : coordination grossly intact [No Focal Deficits] : no focal deficits [Normal Gait] : normal gait [Deep Tendon Reflexes (DTR)] : deep tendon reflexes were 2+ and symmetric [Normal Affect] : the affect was normal [Normal Insight/Judgement] : insight and judgment were intact [de-identified] : left eye oculomotor palsy double vision

## 2024-09-25 NOTE — COUNSELING
[Encouraged to maintain food diary] : Encouraged to maintain food diary [Encouraged to increase physical activity] : Encouraged to increase physical activity [Encouraged to use exercise tracking device] : Encouraged to use exercise tracking device Terbinafine Counseling: Patient counseling regarding adverse effects of terbinafine including but not limited to headache, diarrhea, rash, upset stomach, liver function test abnormalities, itching, taste/smell disturbance, nausea, abdominal pain, and flatulence.  There is a rare possibility of liver failure that can occur when taking terbinafine.  The patient understands that a baseline LFT and kidney function test may be required. The patient verbalized understanding of the proper use and possible adverse effects of terbinafine.  All of the patient's questions and concerns were addressed.

## 2024-09-25 NOTE — HISTORY OF PRESENT ILLNESS
[FreeTextEntry1] : double vision left eye palsy [de-identified] : 2 weeks left eye palsy double vision has prostate cancer was in ER with htn ct at that time was negative he has no chest pain sob nvd or palpitatons

## 2024-09-26 ENCOUNTER — OFFICE (OUTPATIENT)
Dept: URBAN - METROPOLITAN AREA CLINIC 100 | Facility: CLINIC | Age: 64
Setting detail: OPHTHALMOLOGY
End: 2024-09-26
Payer: COMMERCIAL

## 2024-09-26 DIAGNOSIS — H49.22: ICD-10-CM

## 2024-09-26 DIAGNOSIS — H25.13: ICD-10-CM

## 2024-09-26 DIAGNOSIS — H40.1122: ICD-10-CM

## 2024-09-26 DIAGNOSIS — H52.213: ICD-10-CM

## 2024-09-26 PROCEDURE — 92014 COMPRE OPH EXAM EST PT 1/>: CPT | Performed by: OPHTHALMOLOGY

## 2024-09-26 ASSESSMENT — CONFRONTATIONAL VISUAL FIELD TEST (CVF)
OS_FINDINGS: FULL
OD_FINDINGS: FULL

## 2024-09-30 ENCOUNTER — APPOINTMENT (OUTPATIENT)
Dept: MRI IMAGING | Facility: CLINIC | Age: 64
End: 2024-09-30
Payer: COMMERCIAL

## 2024-09-30 PROCEDURE — 70553 MRI BRAIN STEM W/O & W/DYE: CPT | Mod: 26

## 2024-10-02 ENCOUNTER — NON-APPOINTMENT (OUTPATIENT)
Age: 64
End: 2024-10-02

## 2024-10-25 ENCOUNTER — OFFICE (OUTPATIENT)
Dept: URBAN - METROPOLITAN AREA CLINIC 6 | Facility: CLINIC | Age: 64
Setting detail: OPHTHALMOLOGY
End: 2024-10-25
Payer: COMMERCIAL

## 2024-10-25 DIAGNOSIS — H40.1122: ICD-10-CM

## 2024-10-25 DIAGNOSIS — H49.22: ICD-10-CM

## 2024-10-25 DIAGNOSIS — H25.13: ICD-10-CM

## 2024-10-25 PROCEDURE — 99214 OFFICE O/P EST MOD 30 MIN: CPT | Performed by: OPHTHALMOLOGY

## 2024-10-25 PROCEDURE — 92133 CPTRZD OPH DX IMG PST SGM ON: CPT | Performed by: OPHTHALMOLOGY

## 2024-10-25 PROCEDURE — 92083 EXTENDED VISUAL FIELD XM: CPT | Performed by: OPHTHALMOLOGY

## 2024-10-25 ASSESSMENT — REFRACTION_CURRENTRX
OD_SPHERE: -2.00
OD_OVR_VA: 20/
OS_OVR_VA: 20/
OD_AXIS: 055
OD_CYLINDER: -0.75
OS_SPHERE: -2.00
OS_CYLINDER: -0.50
OD_VPRISM_DIRECTION: SV
OS_VPRISM_DIRECTION: SV
OS_AXIS: 140

## 2024-10-25 ASSESSMENT — TONOMETRY
OD_IOP_MMHG: 08
OS_IOP_MMHG: 10

## 2024-10-25 ASSESSMENT — REFRACTION_MANIFEST
OD_VA1: 20/20
OS_VA1: 20/25-
OS_CYLINDER: -0.50
OD_CYLINDER: -0.75
OS_SPHERE: -1.25
OD_SPHERE: -1.25
OD_AXIS: 60
OS_AXIS: 135

## 2024-10-25 ASSESSMENT — CONFRONTATIONAL VISUAL FIELD TEST (CVF)
OD_FINDINGS: FULL
OS_FINDINGS: FULL

## 2024-10-25 ASSESSMENT — KERATOMETRY
METHOD_AUTO_MANUAL: AUTO
OS_K2POWER_DIOPTERS: 41.75
OD_AXISANGLE_DEGREES: 139
OD_K1POWER_DIOPTERS: 41.00
OS_AXISANGLE_DEGREES: 004
OD_K2POWER_DIOPTERS: 41.50
OS_K1POWER_DIOPTERS: 41.50

## 2024-10-25 ASSESSMENT — VISUAL ACUITY
OD_BCVA: 20/25-1
OS_BCVA: 20/25-2

## 2024-10-25 ASSESSMENT — REFRACTION_AUTOREFRACTION
OD_AXIS: 061
OS_AXIS: 116
OS_CYLINDER: -0.25
OD_CYLINDER: -0.75
OD_SPHERE: -1.00
OS_SPHERE: -1.75

## 2025-01-10 ENCOUNTER — OFFICE (OUTPATIENT)
Dept: URBAN - METROPOLITAN AREA CLINIC 6 | Facility: CLINIC | Age: 65
Setting detail: OPHTHALMOLOGY
End: 2025-01-10
Payer: COMMERCIAL

## 2025-01-10 DIAGNOSIS — H25.13: ICD-10-CM

## 2025-01-10 DIAGNOSIS — H40.1122: ICD-10-CM

## 2025-01-10 DIAGNOSIS — H49.22: ICD-10-CM

## 2025-01-10 PROCEDURE — 99214 OFFICE O/P EST MOD 30 MIN: CPT | Performed by: OPHTHALMOLOGY

## 2025-01-10 ASSESSMENT — REFRACTION_AUTOREFRACTION
OS_CYLINDER: -0.25
OD_CYLINDER: -1.00
OD_SPHERE: -0.75
OS_SPHERE: -1.50
OD_AXIS: 063
OS_AXIS: 094

## 2025-01-10 ASSESSMENT — REFRACTION_MANIFEST
OS_AXIS: 135
OD_AXIS: 60
OS_CYLINDER: -0.50
OS_SPHERE: -1.25
OD_SPHERE: -1.25
OD_VA1: 20/20
OS_VA1: 20/25-
OD_CYLINDER: -0.75

## 2025-01-10 ASSESSMENT — KERATOMETRY
OS_K2POWER_DIOPTERS: 41.75
OS_K1POWER_DIOPTERS: 41.50
OS_AXISANGLE_DEGREES: 045
OD_K1POWER_DIOPTERS: 41.25
METHOD_AUTO_MANUAL: AUTO
OD_K2POWER_DIOPTERS: 41.75
OD_AXISANGLE_DEGREES: 137

## 2025-01-10 ASSESSMENT — REFRACTION_CURRENTRX
OS_VPRISM_DIRECTION: SV
OD_VPRISM_DIRECTION: SV
OS_AXIS: 143
OD_OVR_VA: 20/
OS_CYLINDER: -0.50
OS_SPHERE: -2.00
OD_CYLINDER: -0.75
OD_AXIS: 066
OD_SPHERE: -2.00
OS_OVR_VA: 20/

## 2025-01-10 ASSESSMENT — TONOMETRY
OS_IOP_MMHG: 14
OD_IOP_MMHG: 10

## 2025-01-10 ASSESSMENT — VISUAL ACUITY
OD_BCVA: 20/40-2
OS_BCVA: 20/40+2

## 2025-01-10 ASSESSMENT — CONFRONTATIONAL VISUAL FIELD TEST (CVF)
OS_FINDINGS: FULL
OD_FINDINGS: FULL

## 2025-04-11 ENCOUNTER — OFFICE (OUTPATIENT)
Dept: URBAN - METROPOLITAN AREA CLINIC 6 | Facility: CLINIC | Age: 65
Setting detail: OPHTHALMOLOGY
End: 2025-04-11
Payer: COMMERCIAL

## 2025-04-11 DIAGNOSIS — H25.13: ICD-10-CM

## 2025-04-11 DIAGNOSIS — H40.1122: ICD-10-CM

## 2025-04-11 DIAGNOSIS — H49.22: ICD-10-CM

## 2025-04-11 PROCEDURE — 99214 OFFICE O/P EST MOD 30 MIN: CPT | Performed by: OPHTHALMOLOGY

## 2025-04-11 ASSESSMENT — KERATOMETRY
OS_K1POWER_DIOPTERS: 41.00
OS_K2POWER_DIOPTERS: 41.75
OD_K1POWER_DIOPTERS: 40.75
OS_AXISANGLE_DEGREES: 041
OD_AXISANGLE_DEGREES: 146
METHOD_AUTO_MANUAL: AUTO
OD_K2POWER_DIOPTERS: 41.50

## 2025-04-11 ASSESSMENT — REFRACTION_AUTOREFRACTION
OD_AXIS: 054
OS_SPHERE: -1.75
OD_SPHERE: -1.25
OS_AXIS: 128
OS_CYLINDER: -0.75
OD_CYLINDER: -1.00

## 2025-04-11 ASSESSMENT — REFRACTION_CURRENTRX
OD_AXIS: 066
OD_OVR_VA: 20/
OS_OVR_VA: 20/
OS_AXIS: 143
OS_CYLINDER: -0.50
OS_SPHERE: -2.00
OS_VPRISM_DIRECTION: SV
OD_CYLINDER: -0.75
OD_SPHERE: -2.00
OD_VPRISM_DIRECTION: SV

## 2025-04-11 ASSESSMENT — REFRACTION_MANIFEST
OD_VA1: 20/20
OD_AXIS: 60
OS_AXIS: 135
OS_VA1: 20/25-
OD_SPHERE: -1.25
OD_CYLINDER: -0.75
OS_SPHERE: -1.25
OS_CYLINDER: -0.50

## 2025-04-11 ASSESSMENT — VISUAL ACUITY
OS_BCVA: 20/30-
OD_BCVA: 20/40-

## 2025-04-11 ASSESSMENT — TONOMETRY
OS_IOP_MMHG: 16
OD_IOP_MMHG: 15

## 2025-06-16 NOTE — ED ADULT NURSE NOTE - PRIMARY CARE PROVIDER
Gagan For information on Fall & Injury Prevention, visit: [email or web address] OR  [email or web address] OR  https://www.cdc.gov/steadi/patient.html

## 2025-08-05 ENCOUNTER — OFFICE (OUTPATIENT)
Dept: URBAN - METROPOLITAN AREA CLINIC 6 | Facility: CLINIC | Age: 65
Setting detail: OPHTHALMOLOGY
End: 2025-08-05
Payer: COMMERCIAL

## 2025-08-05 DIAGNOSIS — H25.13: ICD-10-CM

## 2025-08-05 DIAGNOSIS — H49.22: ICD-10-CM

## 2025-08-05 DIAGNOSIS — H40.1122: ICD-10-CM

## 2025-08-05 PROCEDURE — 92133 CPTRZD OPH DX IMG PST SGM ON: CPT | Performed by: OPHTHALMOLOGY

## 2025-08-05 PROCEDURE — 92083 EXTENDED VISUAL FIELD XM: CPT | Performed by: OPHTHALMOLOGY

## 2025-08-05 PROCEDURE — 99213 OFFICE O/P EST LOW 20 MIN: CPT | Performed by: OPHTHALMOLOGY

## 2025-08-05 ASSESSMENT — KERATOMETRY
OD_AXISANGLE_DEGREES: 142
OS_K1POWER_DIOPTERS: 41.25
OD_K1POWER_DIOPTERS: 41.00
OD_K2POWER_DIOPTERS: 41.50
OS_AXISANGLE_DEGREES: 056
OS_K2POWER_DIOPTERS: 41.75
METHOD_AUTO_MANUAL: AUTO

## 2025-08-05 ASSESSMENT — REFRACTION_CURRENTRX
OD_CYLINDER: -0.75
OD_VPRISM_DIRECTION: SV
OS_AXIS: 143
OD_AXIS: 066
OD_SPHERE: -2.00
OS_CYLINDER: -0.50
OS_SPHERE: -2.00
OD_OVR_VA: 20/
OS_VPRISM_DIRECTION: SV
OS_OVR_VA: 20/

## 2025-08-05 ASSESSMENT — REFRACTION_MANIFEST
OS_CYLINDER: -0.50
OS_SPHERE: -1.25
OS_AXIS: 135
OD_CYLINDER: -0.75
OD_VA1: 20/20
OD_AXIS: 60
OD_SPHERE: -1.25
OS_VA1: 20/25-

## 2025-08-05 ASSESSMENT — REFRACTION_AUTOREFRACTION
OS_AXIS: 093
OS_CYLINDER: -0.50
OD_CYLINDER: -1.00
OD_AXIS: 067
OS_SPHERE: -1.75
OD_SPHERE: -1.25

## 2025-08-05 ASSESSMENT — TONOMETRY
OS_IOP_MMHG: 16
OD_IOP_MMHG: 14

## 2025-08-05 ASSESSMENT — VISUAL ACUITY
OS_BCVA: 20/25-2
OD_BCVA: 20/30

## 2025-08-05 ASSESSMENT — CONFRONTATIONAL VISUAL FIELD TEST (CVF)
OS_FINDINGS: FULL
OD_FINDINGS: FULL